# Patient Record
Sex: MALE | Race: WHITE | NOT HISPANIC OR LATINO | Employment: OTHER | URBAN - METROPOLITAN AREA
[De-identification: names, ages, dates, MRNs, and addresses within clinical notes are randomized per-mention and may not be internally consistent; named-entity substitution may affect disease eponyms.]

---

## 2017-01-03 ENCOUNTER — OFFICE VISIT (OUTPATIENT)
Dept: FAMILY MEDICINE | Facility: CLINIC | Age: 67
End: 2017-01-03

## 2017-01-03 VITALS
DIASTOLIC BLOOD PRESSURE: 74 MMHG | TEMPERATURE: 98.8 F | WEIGHT: 212 LBS | HEIGHT: 72 IN | OXYGEN SATURATION: 97 % | HEART RATE: 82 BPM | SYSTOLIC BLOOD PRESSURE: 142 MMHG | BODY MASS INDEX: 28.71 KG/M2

## 2017-01-03 DIAGNOSIS — Z11.59 NEED FOR HEPATITIS C SCREENING TEST: ICD-10-CM

## 2017-01-03 DIAGNOSIS — E11.9 TYPE 2 DIABETES MELLITUS WITHOUT COMPLICATION, WITHOUT LONG-TERM CURRENT USE OF INSULIN (H): Primary | ICD-10-CM

## 2017-01-03 DIAGNOSIS — Z23 NEED FOR VACCINATION: ICD-10-CM

## 2017-01-03 DIAGNOSIS — E78.2 MIXED HYPERLIPIDEMIA: ICD-10-CM

## 2017-01-03 LAB — HBA1C MFR BLD: 9 % (ref 4.3–7)

## 2017-01-03 PROCEDURE — 36415 COLL VENOUS BLD VENIPUNCTURE: CPT | Performed by: FAMILY MEDICINE

## 2017-01-03 PROCEDURE — 90471 IMMUNIZATION ADMIN: CPT | Performed by: FAMILY MEDICINE

## 2017-01-03 PROCEDURE — 80053 COMPREHEN METABOLIC PANEL: CPT | Mod: 90 | Performed by: FAMILY MEDICINE

## 2017-01-03 PROCEDURE — 90686 IIV4 VACC NO PRSV 0.5 ML IM: CPT | Performed by: FAMILY MEDICINE

## 2017-01-03 PROCEDURE — 99213 OFFICE O/P EST LOW 20 MIN: CPT | Mod: 25 | Performed by: FAMILY MEDICINE

## 2017-01-03 PROCEDURE — 80061 LIPID PANEL: CPT | Mod: 90 | Performed by: FAMILY MEDICINE

## 2017-01-03 PROCEDURE — 83036 HEMOGLOBIN GLYCOSYLATED A1C: CPT | Performed by: FAMILY MEDICINE

## 2017-01-03 PROCEDURE — 86803 HEPATITIS C AB TEST: CPT | Mod: 90 | Performed by: FAMILY MEDICINE

## 2017-01-03 RX ORDER — ATORVASTATIN CALCIUM 20 MG/1
20 TABLET, FILM COATED ORAL DAILY
Qty: 90 TABLET | Refills: 1 | Status: SHIPPED | OUTPATIENT
Start: 2017-01-03 | End: 2017-08-07

## 2017-01-03 RX ORDER — METFORMIN HCL 500 MG
2000 TABLET, EXTENDED RELEASE 24 HR ORAL
Qty: 360 TABLET | Refills: 1 | Status: SHIPPED | OUTPATIENT
Start: 2017-01-03 | End: 2017-07-18

## 2017-01-03 NOTE — NURSING NOTE
Lars Coleman is here for a fasting med check.    Pre-Visit Screening :  Immunizations : up to date  Colon Screening : is up to date  Asthma Action Test/Plan : NA  PHQ9/GAD7 :  NA  BP done on the left arm, with a large sized cuff.  Pulse - regular  My Chart - accepts    CLASSIFICATION OF OVERWEIGHT AND OBESITY BY BMI                         Obesity Class           BMI(kg/m2)  Underweight                                    < 18.5  Normal                                         18.5-24.9  Overweight                                     25.0-29.9  OBESITY                     I                  30.0-34.9                              II                 35.0-39.9  EXTREME OBESITY             III                >40                             Patient's  BMI Body mass index is 29.16 kg/(m^2).  http://hin.nhlbi.nih.gov/menuplanner/menu.cgi  Questioned patient about current smoking habits.  Pt. has never smoked.  Minnie Lozada, CMA

## 2017-01-03 NOTE — PROGRESS NOTES
SUBJECTIVE:                                                    Lars Coleman is a 66 year old male who presents to clinic today for the following health issues:        Diabetes Follow-up, Lantus at 30 qhs    Patient is checking blood sugars: once daily.  Results are as follows:         am - 170    Diabetic concerns: None     Symptoms of hypoglycemia (low blood sugar): none     Paresthesias (numbness or burning in feet) or sores: No     Date of last diabetic eye exam: planned     Hyperlipidemia Follow-Up      Rate your low fat/cholesterol diet?: good    Taking statin?  Yes, no muscle aches from statin    Other lipid medications/supplements?:  none       Amount of exercise or physical activity: 1 day/week for an average of 30-45 minutes    Problems taking medications regularly: No    Medication side effects: none    Diet: regular (no restrictions)        Problem list and histories reviewed & adjusted, as indicated.  Additional history: as documented    Patient Active Problem List   Diagnosis     Mixed hyperlipidemia     Family history of malignant neoplasm of gastrointestinal tract     Obesity     Abdominal pain, unspecified abdominal location     Acute appendicitis with peritoneal abscess     Calculus of kidney     ACP (advance care planning)     Health Care Home     Type 2 diabetes mellitus without complication (H)     Past Surgical History   Procedure Laterality Date     Hc vasectomy unilat/bilat w postop semen       Vasectomy     Appendectomy         Social History   Substance Use Topics     Smoking status: Never Smoker      Smokeless tobacco: Not on file     Alcohol Use: 0.0 oz/week     0 Standard drinks or equivalent per week      Comment: rare     Family History   Problem Relation Age of Onset     Cancer - colorectal Father      rectal cancer     C.A.D. No family hx of      DIABETES Brother      Prostate Cancer No family hx of      CEREBROVASCULAR DISEASE Father      in his 40s (smoker)         Current  Outpatient Prescriptions   Medication Sig Dispense Refill     atorvastatin (LIPITOR) 20 MG tablet Take 1 tablet (20 mg) by mouth daily 30 tablet 0     blood glucose monitoring (NO BRAND SPECIFIED) test strip Ultra Test Strips  Use to test blood sugars 4 times daily or as directed 200 strip 11     blood glucose monitoring (NO BRAND SPECIFIED) meter device kit Ultra Test Meter  Use to test blood sugar 4 times daily or as directed. 1 kit 0     insulin glargine (LANTUS) 100 UNIT/ML PEN Inject 30 Units Subcutaneous At Bedtime 15 mL 0     metFORMIN (GLUCOPHAGE-XR) 500 MG 24 hr tablet Take 4 tablets (2,000 mg) by mouth daily (with dinner) 360 tablet 1     insulin pen needle 31G X 6 MM Use once daily or as directed. 100 each prn     blood glucose monitoring (ACCU-CHEK MULTICLIX) lancets Use to test blood sugar 3 times daily or as directed. 1 Box 11     ASPIRIN TBEC 325 MG OR 1 tab po QD (Once per day) 100 3     Allergies   Allergen Reactions     No Known Drug Allergies      Recent Labs   Lab Test  01/03/17   0920  07/12/16   0940  07/12/16   0922  03/09/16   1555  03/09/16   1546  01/22/16   0846   07/22/15   0950   A1C  9.0*   --   7.9*   --   9.3*   --    < >   --    LDL   --   130*   --   SEE COMMENT   --   SEE COMMENT  94   --   SEE COMMENT   HDL   --   30*   --   33*   --   29*   --   28*   TRIG   --   317*   --   478*   --   603*   --   860*   ALT   --   20   --    --    --    --    --   29   CR   --   0.88   --    --    --    --    --   0.88   GFRESTIMATED   --   91   --    --    --    --    --   91   POTASSIUM   --   4.3   --    --    --    --    --   4.4    < > = values in this interval not displayed.      BP Readings from Last 3 Encounters:   01/03/17 142/74   07/12/16 130/72   03/09/16 114/70    Wt Readings from Last 3 Encounters:   01/03/17 96.163 kg (212 lb)   07/12/16 94.439 kg (208 lb 3.2 oz)   03/09/16 95.709 kg (211 lb)                    ROS:  Constitutional, HEENT, cardiovascular, pulmonary, gi and gu  "systems are negative, except as otherwise noted.    OBJECTIVE:                                                    /74 mmHg  Pulse 82  Temp(Src) 98.8  F (37.1  C) (Oral)  Ht 1.816 m (5' 11.5\")  Wt 96.163 kg (212 lb)  BMI 29.16 kg/m2  SpO2 97%  Body mass index is 29.16 kg/(m^2).   GENERAL: healthy, alert and no distress  EYES: Eyes grossly normal to inspection, PERRL and conjunctivae and sclerae normal  HENT: ear canals and TM's normal, nose and mouth without ulcers or lesions  NECK: no adenopathy, no asymmetry, masses, or scars and thyroid normal to palpation  RESP: lungs clear to auscultation - no rales, rhonchi or wheezes  CV: regular rate and rhythm, normal S1 S2, no S3 or S4, no murmur, click or rub, no peripheral edema and peripheral pulses strong  ABDOMEN: soft, nontender, no hepatosplenomegaly, no masses and bowel sounds normal  MS: no gross musculoskeletal defects noted, no edema  PSYCH: mentation appears normal, affect normal/bright  Diabetic foot exam: normal DP and PT pulses, no trophic changes or ulcerative lesions and normal sensory exam    Diagnostic Test Results:  Results for orders placed or performed in visit on 01/03/17 (from the past 24 hour(s))   Hemoglobin A1c   Result Value Ref Range    Hemoglobin A1C 9.0 (A) 4.3 - 7.0 %        ASSESSMENT:                                                        PLAN:                                                    (E11.9) Type 2 diabetes mellitus without complication, without long-term current use of insulin (H)  (primary encounter diagnosis)  Comment: not exercising and sugars up , will increase Lantus to 36 units  Plan: Albumin Random Urine Quantitative, Lipid         Profile, Comprehensive metabolic panel        Increase lantus, I reviewed the risks, benefits, and possible side effects of the medication.  The patient had an opportunity to ask any questions regarding the treatment plan. The patient was encouraged to call my office if any " "problems. Recheck 3 mo    (E78.2) Mixed hyperlipidemia  Comment: well controlled  Plan: Lipid Profile, Comprehensive metabolic panel        continue current medications at current doses     (Z11.59) Need for hepatitis C screening test  Comment:   Plan: Hepatitis C antibody            (Z23) Need for vaccination  Comment:   Plan: VACCINE ADMINISTRATION, INITIAL, HC FLU VAC         PRESRV FREE QUAD SPLIT VIR 3+YRS IM              BMI:   Estimated body mass index is 29.16 kg/(m^2) as calculated from the following:    Height as of this encounter: 1.816 m (5' 11.5\").    Weight as of this encounter: 96.163 kg (212 lb).   Weight management plan: Discussed healthy diet and exercise guidelines and patient will follow up in 6 months in clinic to re-evaluate.      FUTURE APPOINTMENTS:       - Follow-up visit in 3 mo    Pop Pena MD  [facility]      "

## 2017-01-04 LAB
ALBUMIN SERPL-MCNC: 4.3 G/DL (ref 3.6–5.1)
ALBUMIN/GLOB SERPL: 1.4 (CALC) (ref 1–2.5)
ALP SERPL-CCNC: 75 U/L (ref 40–115)
ALT SERPL-CCNC: 25 U/L (ref 9–46)
AST SERPL-CCNC: 23 U/L (ref 10–35)
BILIRUB SERPL-MCNC: 0.6 MG/DL (ref 0.2–1.2)
BUN SERPL-MCNC: 14 MG/DL (ref 7–25)
BUN/CREATININE RATIO: ABNORMAL (CALC) (ref 6–22)
CALCIUM SERPL-MCNC: 9.5 MG/DL (ref 8.6–10.3)
CHLORIDE SERPLBLD-SCNC: 102 MMOL/L (ref 98–110)
CHOLEST SERPL-MCNC: 140 MG/DL (ref 125–200)
CHOLEST/HDLC SERPL: 4.5 (CALC)
CO2 SERPL-SCNC: 27 MMOL/L (ref 20–31)
CREAT SERPL-MCNC: 0.86 MG/DL (ref 0.7–1.25)
EGFR AFRICAN AMERICAN - QUEST: 105 ML/MIN/1.73M2
GFR SERPL CREATININE-BSD FRML MDRD: 91 ML/MIN/1.73M2
GLOBULIN, CALCULATED - QUEST: 3 G/DL (CALC) (ref 1.9–3.7)
GLUCOSE - QUEST: 195 MG/DL (ref 65–99)
HCV AB - QUEST: NORMAL
HDLC SERPL-MCNC: 31 MG/DL
LDLC SERPL CALC-MCNC: 71 MG/DL (CALC)
NONHDLC SERPL-MCNC: 109 MG/DL (CALC)
POTASSIUM SERPL-SCNC: 4.1 MMOL/L (ref 3.5–5.3)
PROT SERPL-MCNC: 7.3 G/DL (ref 6.1–8.1)
SIGNAL TO CUT OFF - QUEST: 0.05
SODIUM SERPL-SCNC: 138 MMOL/L (ref 135–146)
TRIGL SERPL-MCNC: 189 MG/DL

## 2017-02-13 DIAGNOSIS — E11.9 TYPE 2 DIABETES MELLITUS WITHOUT COMPLICATION, WITHOUT LONG-TERM CURRENT USE OF INSULIN (H): ICD-10-CM

## 2017-02-13 NOTE — TELEPHONE ENCOUNTER
Patient is requesting a refill of the following med:  Pending Prescriptions:                       Disp   Refills    insulin glargine (LANTUS) 100 UNIT/ML inj*                    Sig: Inject 36 Units Subcutaneous At Bedtime    Last Refill: 01/03/2017  Last Office Visit: 01/03/2017  Scheduled Office Visit: None Scheduled    Please Close Encounter If Approved.    Thank You,  Minnie Lozada, CMA

## 2017-07-18 DIAGNOSIS — E11.9 TYPE 2 DIABETES MELLITUS WITHOUT COMPLICATION, WITHOUT LONG-TERM CURRENT USE OF INSULIN (H): ICD-10-CM

## 2017-07-18 RX ORDER — METFORMIN HCL 500 MG
2000 TABLET, EXTENDED RELEASE 24 HR ORAL
Qty: 120 TABLET | Refills: 0 | COMMUNITY
Start: 2017-07-18 | End: 2017-08-15

## 2017-07-18 NOTE — TELEPHONE ENCOUNTER
Refill Request Received from [facility] in [place], [place].    Patient has a CPX scheduled for 08/22/17 with Dr. Pena.    Faxed a 30 day supply of the patient's medication to his pharmacy to last him through to his appointment.    Signed Prescriptions:                        Disp   Refills    metFORMIN (GLUCOPHAGE-XR) 500 MG 24 hr tab*120 ta*0        Sig: Take 4 tablets (2,000 mg) by mouth daily (with           dinner)  Authorizing Provider: DAVID PENA  Ordering User: ANNA GARVEY, [facility]

## 2017-08-07 ENCOUNTER — TELEPHONE (OUTPATIENT)
Dept: FAMILY MEDICINE | Facility: CLINIC | Age: 67
End: 2017-08-07

## 2017-08-07 DIAGNOSIS — E78.2 MIXED HYPERLIPIDEMIA: ICD-10-CM

## 2017-08-07 RX ORDER — ATORVASTATIN CALCIUM 20 MG/1
20 TABLET, FILM COATED ORAL DAILY
Qty: 30 TABLET | Refills: 0 | COMMUNITY
Start: 2017-08-07 | End: 2017-08-22

## 2017-08-15 DIAGNOSIS — E11.9 TYPE 2 DIABETES MELLITUS WITHOUT COMPLICATION, WITHOUT LONG-TERM CURRENT USE OF INSULIN (H): ICD-10-CM

## 2017-08-15 RX ORDER — METFORMIN HCL 500 MG
2000 TABLET, EXTENDED RELEASE 24 HR ORAL
Qty: 120 TABLET | Refills: 0 | COMMUNITY
Start: 2017-08-15 | End: 2017-08-22

## 2017-08-15 NOTE — TELEPHONE ENCOUNTER
Called and spoke with the patient to see if he had enough Metformin to last him until his appointment, he does not so I am sending a 30 day supply to his pharmacy.    Signed Prescriptions:                        Disp   Refills    metFORMIN (GLUCOPHAGE-XR) 500 MG 24 hr tab*120 ta*0        Sig: Take 4 tablets (2,000 mg) by mouth daily (with           dinner)  Authorizing Provider: DAVID JOSE  Ordering User: ANNA GARVEY, [facility]

## 2017-08-21 ENCOUNTER — TRANSFERRED RECORDS (OUTPATIENT)
Dept: FAMILY MEDICINE | Facility: CLINIC | Age: 67
End: 2017-08-21

## 2017-08-22 ENCOUNTER — OFFICE VISIT (OUTPATIENT)
Dept: FAMILY MEDICINE | Facility: CLINIC | Age: 67
End: 2017-08-22

## 2017-08-22 ENCOUNTER — TELEPHONE (OUTPATIENT)
Dept: PHARMACY | Facility: OTHER | Age: 67
End: 2017-08-22

## 2017-08-22 VITALS
TEMPERATURE: 98.7 F | HEIGHT: 72 IN | OXYGEN SATURATION: 98 % | WEIGHT: 204 LBS | SYSTOLIC BLOOD PRESSURE: 134 MMHG | DIASTOLIC BLOOD PRESSURE: 68 MMHG | BODY MASS INDEX: 27.63 KG/M2 | HEART RATE: 103 BPM

## 2017-08-22 DIAGNOSIS — E11.9 TYPE 2 DIABETES MELLITUS WITHOUT COMPLICATION, WITH LONG-TERM CURRENT USE OF INSULIN (H): ICD-10-CM

## 2017-08-22 DIAGNOSIS — E78.2 MIXED HYPERLIPIDEMIA: ICD-10-CM

## 2017-08-22 DIAGNOSIS — Z00.00 ROUTINE GENERAL MEDICAL EXAMINATION AT A HEALTH CARE FACILITY: Primary | ICD-10-CM

## 2017-08-22 DIAGNOSIS — Z23 NEED FOR VACCINATION: ICD-10-CM

## 2017-08-22 DIAGNOSIS — Z79.4 TYPE 2 DIABETES MELLITUS WITHOUT COMPLICATION, WITH LONG-TERM CURRENT USE OF INSULIN (H): ICD-10-CM

## 2017-08-22 LAB — HBA1C MFR BLD: 8.3 % (ref 4–7)

## 2017-08-22 PROCEDURE — 83036 HEMOGLOBIN GLYCOSYLATED A1C: CPT | Performed by: FAMILY MEDICINE

## 2017-08-22 PROCEDURE — 36415 COLL VENOUS BLD VENIPUNCTURE: CPT | Performed by: FAMILY MEDICINE

## 2017-08-22 PROCEDURE — 84153 ASSAY OF PSA TOTAL: CPT | Mod: 90 | Performed by: FAMILY MEDICINE

## 2017-08-22 PROCEDURE — 80053 COMPREHEN METABOLIC PANEL: CPT | Mod: 90 | Performed by: FAMILY MEDICINE

## 2017-08-22 PROCEDURE — 80061 LIPID PANEL: CPT | Mod: 90 | Performed by: FAMILY MEDICINE

## 2017-08-22 PROCEDURE — 90471 IMMUNIZATION ADMIN: CPT | Performed by: FAMILY MEDICINE

## 2017-08-22 PROCEDURE — 90670 PCV13 VACCINE IM: CPT | Performed by: FAMILY MEDICINE

## 2017-08-22 PROCEDURE — 99397 PER PM REEVAL EST PAT 65+ YR: CPT | Mod: 25 | Performed by: FAMILY MEDICINE

## 2017-08-22 RX ORDER — ATORVASTATIN CALCIUM 20 MG/1
20 TABLET, FILM COATED ORAL DAILY
Qty: 90 TABLET | Refills: 0 | Status: SHIPPED | OUTPATIENT
Start: 2017-08-22 | End: 2017-11-27

## 2017-08-22 RX ORDER — METFORMIN HCL 500 MG
2000 TABLET, EXTENDED RELEASE 24 HR ORAL
Qty: 360 TABLET | Refills: 0 | Status: SHIPPED | OUTPATIENT
Start: 2017-08-22 | End: 2017-11-15

## 2017-08-22 NOTE — TELEPHONE ENCOUNTER
MTM referral from: [place] clinic visit (referral by provider)    MTM referral outreach attempt #1 on August 22, 2017 at 12:07 PM      Outcome: Left Message    Maricel Waterman MTM Coordinator

## 2017-08-22 NOTE — PROGRESS NOTES
SUBJECTIVE:   CC: Lars Coleman is an 67 year old male who presents for preventative health visit.     Healthy Habits:    Do you get at least three servings of calcium containing foods daily (dairy, green leafy vegetables, etc.)? yes    Amount of exercise or daily activities, outside of work: 5 day(s) per week    Problems taking medications regularly No    Medication side effects: No    Have you had an eye exam in the past two years? yes    Do you see a dentist twice per year? yes    Do you have sleep apnea, excessive snoring or daytime drowsiness?no      Pt checking am sugars and is seeing 150-180 mainly        Today's PHQ-2 Score: PHQ-2 ( 1999 Pfizer) 8/22/2017   Q1: Little interest or pleasure in doing things 0   Q2: Feeling down, depressed or hopeless 0   PHQ-2 Score 0         Abuse: Current or Past(Physical, Sexual or Emotional)- No  Do you feel safe in your environment - Yes  Social History   Substance Use Topics     Smoking status: Never Smoker     Smokeless tobacco: Not on file     Alcohol use 0.0 oz/week     0 Standard drinks or equivalent per week      Comment: rare     The patient does not drink >3 drinks per day nor >7 drinks per week.    Last PSA:   [facility] PSA   Date Value Ref Range Status   07/09/2003 0.8 0.0 - 4.0 NG/ML Final       Reviewed orders with patient. Reviewed health maintenance and updated orders accordingly - Yes  BP Readings from Last 3 Encounters:   08/22/17 134/68   01/03/17 142/74   07/12/16 130/72    Wt Readings from Last 3 Encounters:   08/22/17 92.5 kg (204 lb)   01/03/17 96.2 kg (212 lb)   07/12/16 94.4 kg (208 lb 3.2 oz)                  Patient Active Problem List   Diagnosis     Mixed hyperlipidemia     Family history of malignant neoplasm of gastrointestinal tract     Calculus of kidney     ACP (advance care planning)     Health Care Home     Type 2 diabetes mellitus without complication, with long-term current use of insulin (H)     Past Surgical History:   Procedure  Laterality Date     APPENDECTOMY       HC VASECTOMY UNILAT/BILAT W POSTOP SEMEN      Vasectomy       Social History   Substance Use Topics     Smoking status: Never Smoker     Smokeless tobacco: Not on file     Alcohol use 0.0 oz/week     0 Standard drinks or equivalent per week      Comment: rare     Family History   Problem Relation Age of Onset     Cancer - colorectal Father      rectal cancer     C.A.D. No family hx of      DIABETES Brother      Prostate Cancer No family hx of      CEREBROVASCULAR DISEASE Father      in his 40s (smoker)         Current Outpatient Prescriptions   Medication Sig Dispense Refill     metFORMIN (GLUCOPHAGE-XR) 500 MG 24 hr tablet Take 4 tablets (2,000 mg) by mouth daily (with dinner) 120 tablet 0     atorvastatin (LIPITOR) 20 MG tablet Take 1 tablet (20 mg) by mouth daily 30 tablet 0     insulin glargine (LANTUS) 100 UNIT/ML injection Inject 36 Units Subcutaneous At Bedtime 3 mL 1     insulin pen needle 31G X 6 MM Use once daily or as directed. 100 each prn     blood glucose monitoring (NO BRAND SPECIFIED) test strip Ultra Test Strips  Use to test blood sugars 4 times daily or as directed 200 strip 11     blood glucose monitoring (NO BRAND SPECIFIED) meter device kit Ultra Test Meter  Use to test blood sugar 4 times daily or as directed. 1 kit 0     blood glucose monitoring (ACCU-CHEK MULTICLIX) lancets Use to test blood sugar 3 times daily or as directed. 1 Box 11     ASPIRIN TBEC 325 MG OR 1 tab po QD (Once per day) 100 3     Allergies   Allergen Reactions     No Known Drug Allergies      Recent Labs   Lab Test  08/22/17   1053  01/03/17   0934  01/03/17   0920  07/12/16   0940  07/12/16   0922  03/09/16   1555   07/22/15   0950   A1C  8.3*   --   9.0*   --   7.9*   --    < >   --    LDL   --   71   --   130*   --   SEE COMMENT   < >  SEE COMMENT   HDL   --   31*   --   30*   --   33*   < >  28*   TRIG   --   189*   --   317*   --   478*   < >  860*   ALT   --   25   --   20   --    " --    --   29   CR   --   0.86   --   0.88   --    --    --   0.88   GFRESTIMATED   --   91   --   91   --    --    --   91   POTASSIUM   --   4.1   --   4.3   --    --    --   4.4    < > = values in this interval not displayed.              Reviewed and updated as needed this visit by clinical staffTobacco  Allergies  Problems         Reviewed and updated as needed this visit by Provider        Past Medical History:   Diagnosis Date     Family history of diabetes mellitus     Brother     Family history of malignant neoplasm of gastrointestinal tract     Father     Mixed hyperlipidemia       Past Surgical History:   Procedure Laterality Date     APPENDECTOMY       HC VASECTOMY UNILAT/BILAT W POSTOP SEMEN      Vasectomy       ROS:  C: NEGATIVE for fever, chills, change in weight  I: NEGATIVE for worrisome rashes, moles or lesions  E: NEGATIVE for vision changes or irritation  ENT: NEGATIVE for ear, mouth and throat problems  R: NEGATIVE for significant cough or SOB  CV: NEGATIVE for chest pain, palpitations or peripheral edema  GI: NEGATIVE for nausea, abdominal pain, heartburn, or change in bowel habits   male: negative for dysuria, hematuria, decreased urinary stream, erectile dysfunction, urethral discharge  M: NEGATIVE for significant arthralgias or myalgia  N: NEGATIVE for weakness, dizziness or paresthesias  E: NEGATIVE for temperature intolerance, skin/hair changes  P: NEGATIVE for changes in mood or affect    OBJECTIVE:   /68 (BP Location: Right arm, Patient Position: Chair, Cuff Size: Adult Large)  Pulse 103  Temp 98.7  F (37.1  C) (Oral)  Ht 1.816 m (5' 11.5\")  Wt 92.5 kg (204 lb)  SpO2 98%  BMI 28.06 kg/m2  EXAM:  GENERAL: healthy, alert and no distress  EYES: Eyes grossly normal to inspection, PERRL and conjunctivae and sclerae normal  HENT: ear canals and TM's normal, nose and mouth without ulcers or lesions  NECK: no adenopathy, no asymmetry, masses, or scars and thyroid normal to " palpation  RESP: lungs clear to auscultation - no rales, rhonchi or wheezes  CV: regular rate and rhythm, normal S1 S2, no S3 or S4, no murmur, click or rub, no peripheral edema and peripheral pulses strong  ABDOMEN: soft, nontender, no hepatosplenomegaly, no masses and bowel sounds normal   (male): normal male genitalia without lesions or urethral discharge, no hernia  RECTAL (male): deferred  MS: no gross musculoskeletal defects noted, no edema  SKIN: no suspicious lesions or rashes  NEURO: Normal strength and tone, mentation intact and speech normal  PSYCH: mentation appears normal, affect normal/bright    ASSESSMENT/PLAN:   (Z00.00) Routine general medical examination at a health care facility  (primary encounter diagnosis)  Comment: discussed preventitive healthcare   Plan: Continue to work on healthy diet and exercise, discussed healthy habits     (E11.9,  Z79.4) Type 2 diabetes mellitus without complication, with long-term current use of insulin (H)  Comment: poorly controlled-needs to get am sugars under goal, pt using supplement smoothie daily-not sure how this might affect sugars  Plan: metFORMIN (GLUCOPHAGE-XR) 500 MG 24 hr tablet,         insulin glargine (LANTUS) 100 UNIT/ML injection        Pt to use rule of 3's-will increase Lantus by 3 units after every 3 day period where am sugars above 130. Goal is for all am sugars to fall between -pt to let me know how this goes in 2 weeks    MTM referral as well     (E78.2) Mixed hyperlipidemia  Comment: control uncertain  Plan: atorvastatin (LIPITOR) 20 MG tablet        continue current medications at current doses       COUNSELING:  Reviewed preventive health counseling, as reflected in patient instructions       Regular exercise       Healthy diet/nutrition       Immunizations    Vaccinated for: Pneumococcal           Colon cancer screening       Prostate cancer screening      BP Screening:   Last 3 BP Readings:    BP Readings from Last 3 Encounters:  "  08/22/17 134/68   01/03/17 142/74   07/12/16 130/72       The following was recommended to the patient:  Re-screen BP within a year and recommended lifestyle modifications       reports that he has never smoked. He does not have any smokeless tobacco history on file.      Estimated body mass index is 28.06 kg/(m^2) as calculated from the following:    Height as of this encounter: 1.816 m (5' 11.5\").    Weight as of this encounter: 92.5 kg (204 lb).   Weight management plan: Discussed healthy diet and exercise guidelines and patient will follow up in 3 months in clinic to re-evaluate.    Counseling Resources:  ATP IV Guidelines  Pooled Cohorts Equation Calculator  FRAX Risk Assessment  ICSI Preventive Guidelines  Dietary Guidelines for Americans, 2010  USDA's MyPlate  ASA Prophylaxis  Lung CA Screening    Pop Pena MD  [facility]  "

## 2017-08-22 NOTE — NURSING NOTE
Lars is here for CPX and form    Patient is here for a full physical exam.    Pre-Visit Screening :  Immunizations : Prevnar 13 today  Colon Screening : is up to date  Mammogram:NA  Asthma Action Test/Plan : NA  PHQ9/GAD7 :  none  Fall Risk Assessment is up to date    Vitals:  Pulse - regular  My Chart - accepts    CLASSIFICATION OF OVERWEIGHT AND OBESITY BY BMI                         Obesity Class           BMI(kg/m2)  Underweight                                    < 18.5  Normal                                         18.5-24.9  Overweight                                     25.0-29.9  OBESITY                     I                  30.0-34.9                              II                 35.0-39.9  EXTREME OBESITY             III                >40                             Patient's  BMI Body mass index is 28.06 kg/(m^2).  http://hin.nhlbi.nih.gov/menuplanner/menu.cgi  Questioned patient about current smoking habits.  Pt. has never smoked.    ETOH screening:  Questions:  1-How often do you have a drink containing alcohol?                             2 times per week(s)  2-How many drinks containing alcohol do you have on a typical day when you are         Drinking?                              2   3- How often do you have 5 or more drinks on one occasion?                              Never    Have you ever:  None of the patient's responses to the CAGE screening were positive / Negative CAGE score

## 2017-08-22 NOTE — MR AVS SNAPSHOT
After Visit Summary   8/22/2017    Lars Coleman    MRN: [id number]           Patient Information     Date Of Birth          1950        Visit Information        Provider Department      8/22/2017 10:30 AM Pop Pena MD [facility]        Today's Diagnoses     Routine general medical examination at a health care facility    -  1    Type 2 diabetes mellitus without complication, with long-term current use of insulin (H)        Mixed hyperlipidemia           Follow-ups after your visit        Additional Services     MED THERAPY MANAGE REFERRAL       Your provider has referred you to: **[place] [facility] Scheduling (numerous locations) [phone]   [email or web address]  [facility] [place] [phone]   [email or web address]    Reason for Referral: diabetes    The [place] [facility] department will contact you to schedule an appointment.  You may also schedule the appointment by calling [phone].  For [place] patients, please call [phone] to confirm/schedule your appointment on the next business day.    This service is designed to help you get the most from your medications.  A specially trained Pharmacist will work closely with you and your providers to solve any questions, concerns, issues or problems related to your medications.    Please bring all of your prescription and non-prescription medications (such as vitamins, over-the-counter medications, and herbals) or a detailed medication list to your appointment.    If you have a glucose meter or other home monitoring information, please also bring this to your appointment (i.e. blood glucose log, blood pressure log, pain log, etc.).                  Follow-up notes from your care team     Return in about 3 months (around 11/22/2017).      Who to contact     If you have  "questions or need follow up information about today's clinic visit or your schedule please contact [facility] directly at [phone].  Normal or non-critical lab and imaging results will be communicated to you by MyChart, letter or phone within 4 business days after the clinic has received the results. If you do not hear from us within 7 days, please contact the clinic through [employer] or phone. If you have a critical or abnormal lab result, we will notify you by phone as soon as possible.  Submit refill requests through [employer] or call your pharmacy and they will forward the refill request to us. Please allow 3 business days for your refill to be completed.          Additional Information About Your Visit        [employer] Information     [employer] gives you secure access to your electronic health record. If you see a primary care provider, you can also send messages to your care team and make appointments. If you have questions, please call your primary care clinic.  If you do not have a primary care provider, please call [phone] and they will assist you.        Care EveryWhere ID     This is your Care EveryWhere ID. This could be used by other organizations to access your [place] medical records  [id number]        Your Vitals Were     Pulse Temperature Height Pulse Oximetry BMI (Body Mass Index)       103 98.7  F (37.1  C) (Oral) 1.816 m (5' 11.5\") 98% 28.06 kg/m2        Blood Pressure from Last 3 Encounters:   08/22/17 134/68   01/03/17 142/74   07/12/16 130/72    Weight from Last 3 Encounters:   08/22/17 92.5 kg (204 lb)   01/03/17 96.2 kg (212 lb)   07/12/16 94.4 kg (208 lb 3.2 oz)              We Performed the Following     COMPREHENSIVE METABOLIC PANEL ([facility]) XCMP     HCL PSA, SCREENING ([facility])     Hemoglobin A1c     Lipid Profile ([facility])     MED THERAPY MANAGE REFERRAL     VENOUS COLLECTION          Today's Medication Changes          These changes are accurate as of: 8/22/17 " 11:06 AM.  If you have any questions, ask your nurse or doctor.               These medicines have changed or have updated prescriptions.        Dose/Directions    insulin glargine 100 UNIT/ML injection   Commonly known as:  LANTUS   This may have changed:  how much to take   Used for:  Type 2 diabetes mellitus without complication, with long-term current use of insulin (H)   Changed by:  Pop Pena MD        Dose:  39 Units   Inject 39 Units Subcutaneous At Bedtime   Quantity:  3 mL   Refills:  0            Where to get your medicines      These medications were sent to [place] [address] [facility] - [place], [place] - [address]  [address], [facility] [address]     Phone:  [phone]     atorvastatin 20 MG tablet    insulin glargine 100 UNIT/ML injection    metFORMIN 500 MG 24 hr tablet                Primary Care Provider Office Phone # Fax #    Pop Pena [phone] [address] [facility] [address]  [facility] [address]        Equal Access to Services     [facility] Center[facility]DIR[facility] AH: Hadii aad ku hadasho Soomaali, waaxda luqadaha, qaybta kaalmada adeegyada, waxay idiin hayaan adeeg kharaleonel buck . So [facility] [phone].    ATENCIÓN: Si habla español, tiene a queen disposición servicios gratuitos de asistencia lingüística. Llame al [phone].    We comply with applicable federal civil rights laws and [place] laws. We do not discriminate on the basis of race, color, national origin, age, disability sex, sexual orientation or gender identity.            Thank you!     Thank you for choosing [facility]  for your care. Our goal is always to provide you with excellent care. Hearing back from our patients is one way we can continue to improve our services. Please take a few minutes to complete the written survey that you may receive in the mail after your visit with us. Thank you!             Your Updated Medication List - Protect others around you: Learn how  to safely use, store and throw away your medicines at www.disposemymeds.org.          This list is accurate as of: 8/22/17 11:06 AM.  Always use your most recent med list.                   Brand Name Dispense Instructions for use Diagnosis    aspirin 325 MG EC tablet     100    1 tab po QD (Once per day)        atorvastatin 20 MG tablet    LIPITOR    90 tablet    Take 1 tablet (20 mg) by mouth daily    Mixed hyperlipidemia       blood glucose monitoring lancets     1 Box    Use to test blood sugar 3 times daily or as directed.    Type 2 diabetes mellitus without complication (H)       blood glucose monitoring meter device kit    no brand specified    1 kit    Ultra Test Meter Use to test blood sugar 4 times daily or as directed.    Type 2 diabetes mellitus without complication, without long-term current use of insulin (H)       blood glucose monitoring test strip    no brand specified    200 strip    Ultra Test Strips Use to test blood sugars 4 times daily or as directed    Type 2 diabetes mellitus without complication, without long-term current use of insulin (H)       insulin glargine 100 UNIT/ML injection    LANTUS    3 mL    Inject 39 Units Subcutaneous At Bedtime    Type 2 diabetes mellitus without complication, with long-term current use of insulin (H)       insulin pen needle 31G X 6 MM     100 each    Use once daily or as directed.    Type 2 diabetes mellitus without complication, without long-term current use of insulin (H)       metFORMIN 500 MG 24 hr tablet    GLUCOPHAGE-XR    360 tablet    Take 4 tablets (2,000 mg) by mouth daily (with dinner)    Type 2 diabetes mellitus without complication, with long-term current use of insulin (H)

## 2017-08-23 LAB
ABBOTT PSA - QUEST: 0.7 NG/ML
ALBUMIN SERPL-MCNC: 4.4 G/DL (ref 3.6–5.1)
ALBUMIN/GLOB SERPL: 1.5 (CALC) (ref 1–2.5)
ALP SERPL-CCNC: 101 U/L (ref 40–115)
ALT SERPL-CCNC: 21 U/L (ref 9–46)
AST SERPL-CCNC: 19 U/L (ref 10–35)
BILIRUB SERPL-MCNC: 0.7 MG/DL (ref 0.2–1.2)
BUN SERPL-MCNC: 14 MG/DL (ref 7–25)
BUN/CREATININE RATIO: ABNORMAL (CALC) (ref 6–22)
CALCIUM SERPL-MCNC: 9.4 MG/DL (ref 8.6–10.3)
CHLORIDE SERPLBLD-SCNC: 102 MMOL/L (ref 98–110)
CHOLEST SERPL-MCNC: 155 MG/DL
CHOLEST/HDLC SERPL: 5.2 (CALC)
CO2 SERPL-SCNC: 24 MMOL/L (ref 20–31)
CREAT SERPL-MCNC: 0.87 MG/DL (ref 0.7–1.25)
EGFR AFRICAN AMERICAN - QUEST: 104 ML/MIN/1.73M2
GFR SERPL CREATININE-BSD FRML MDRD: 90 ML/MIN/1.73M2
GLOBULIN, CALCULATED - QUEST: 3 G/DL (CALC) (ref 1.9–3.7)
GLUCOSE - QUEST: 218 MG/DL (ref 65–99)
HDLC SERPL-MCNC: 30 MG/DL
LDLC SERPL CALC-MCNC: 91 MG/DL (CALC)
NONHDLC SERPL-MCNC: 125 MG/DL (CALC)
POTASSIUM SERPL-SCNC: 4.5 MMOL/L (ref 3.5–5.3)
PROT SERPL-MCNC: 7.4 G/DL (ref 6.1–8.1)
SODIUM SERPL-SCNC: 139 MMOL/L (ref 135–146)
TRIGL SERPL-MCNC: 242 MG/DL

## 2017-08-24 NOTE — TELEPHONE ENCOUNTER
MTM referral from: [facility] visit (referral by provider)    MTM referral outreach attempt #2 on August 24, 2017 at 3:23 PM      Outcome: Patient not reachable after several attempts, will route to MTM Pharmacist/Provider as an FYI. Thank you for the referral.    Maricel Waterman, MTM Coordinator

## 2017-08-28 ENCOUNTER — TELEPHONE (OUTPATIENT)
Dept: FAMILY MEDICINE | Facility: CLINIC | Age: 67
End: 2017-08-28

## 2017-08-28 NOTE — TELEPHONE ENCOUNTER
Filled out lab results on [facility] Screen form faxed to [phone] scanned a copy and mailed original to patient

## 2017-10-04 ENCOUNTER — TELEPHONE (OUTPATIENT)
Dept: FAMILY MEDICINE | Facility: CLINIC | Age: 67
End: 2017-10-04

## 2017-10-04 DIAGNOSIS — E11.9 TYPE 2 DIABETES MELLITUS WITHOUT COMPLICATION, WITH LONG-TERM CURRENT USE OF INSULIN (H): ICD-10-CM

## 2017-10-04 DIAGNOSIS — Z79.4 TYPE 2 DIABETES MELLITUS WITHOUT COMPLICATION, WITH LONG-TERM CURRENT USE OF INSULIN (H): ICD-10-CM

## 2017-10-04 NOTE — TELEPHONE ENCOUNTER
Patient is due for a medication recheck for the following medication Lantus ,and meets the qualifications for a physician approved 30 day extension.    A #30 day refill has been called into the pharmacy listed.     staff, per the refill protocol can you please call the patient and help assist in setting up a Non Fasting medication recheck.  Please attempt to reach the patient to schedule that appointment, and if you are unable to reach them, please forward back to the prescribing physician.      Telephone Information:   Mobile [phone]   Mobile [phone] (home) [phone] (work)      Thank You  BIPIN Wong

## 2017-11-14 ENCOUNTER — MYC MEDICAL ADVICE (OUTPATIENT)
Dept: FAMILY MEDICINE | Facility: CLINIC | Age: 67
End: 2017-11-14

## 2017-11-14 DIAGNOSIS — Z79.4 TYPE 2 DIABETES MELLITUS WITHOUT COMPLICATION, WITH LONG-TERM CURRENT USE OF INSULIN (H): ICD-10-CM

## 2017-11-14 DIAGNOSIS — E11.9 TYPE 2 DIABETES MELLITUS WITHOUT COMPLICATION, WITH LONG-TERM CURRENT USE OF INSULIN (H): ICD-10-CM

## 2017-11-15 RX ORDER — METFORMIN HCL 500 MG
2000 TABLET, EXTENDED RELEASE 24 HR ORAL
Qty: 120 TABLET | Refills: 0 | COMMUNITY
Start: 2017-11-15 | End: 2017-12-14

## 2017-11-15 NOTE — TELEPHONE ENCOUNTER
From: Minnie Lozada CMA  To: Lars Coleman  Sent: 11/14/2017 3:31 PM CST  Subject: Refill?    Lars,    I have received a refill request from your pharmacy for Metformin. I can see that you have an appointment scheduled for 11/22/17, do you have enough medication to last you until that appointment?    Please let me know. Have a great day.    Minnie Lozada CMA

## 2017-11-16 ENCOUNTER — OFFICE VISIT (OUTPATIENT)
Dept: PHARMACY | Facility: PHYSICIAN GROUP | Age: 67
End: 2017-11-16
Payer: COMMERCIAL

## 2017-11-16 DIAGNOSIS — E11.9 TYPE 2 DIABETES MELLITUS WITHOUT COMPLICATION, WITH LONG-TERM CURRENT USE OF INSULIN (H): Primary | ICD-10-CM

## 2017-11-16 DIAGNOSIS — Z79.4 TYPE 2 DIABETES MELLITUS WITHOUT COMPLICATION, WITH LONG-TERM CURRENT USE OF INSULIN (H): Primary | ICD-10-CM

## 2017-11-16 PROCEDURE — 99207 ZZC NO CHARGE LOS: CPT | Performed by: PHARMACIST

## 2017-11-16 NOTE — MR AVS SNAPSHOT
After Visit Summary   11/16/2017    Lars Coleman    MRN: [id number]           Patient Information     Date Of Birth          1950        Visit Information        Provider Department      11/16/2017 8:30 AM Leslye Frederick [facility]        Care Instructions    Take Lantus 34 units once daily- every day.     Leslye Frederick, Pharm.D, [facility]  Medication Therapy Management Pharmacist  [phone]            Follow-ups after your visit        Your next 10 appointments already scheduled     Nov 22, 2017  9:15 AM CST   Office Visit with Pop Pena MD   [facility] ([facility])    [address]  [address]  [facility] [address] [phone]              Who to contact     If you have questions or need follow up information about today's clinic visit or your schedule please contact [facility] directly at [phone].  Normal or non-critical lab and imaging results will be communicated to you by Instaclustrhart, letter or phone within 4 business days after the clinic has received the results. If you do not hear from us within 7 days, please contact the clinic through Instaclustrhart or phone. If you have a critical or abnormal lab result, we will notify you by phone as soon as possible.  Submit refill requests through [employer]Fuse or call your pharmacy and they will forward the refill request to us. Please allow 3 business days for your refill to be completed.          Additional Information About Your Visit        MyChart Information     [employer] gives you secure access to your electronic health record. If you see a primary care provider, you can also send messages to your care team and make appointments. If you have questions, please call your primary care clinic.  If you do not have a primary care provider, please call [phone] and they will assist you.        Care EveryWhere ID     This is your  Care EveryWhere ID. This could be used by other organizations to access your [place] medical records  [id number]         Blood Pressure from Last 3 Encounters:   08/22/17 134/68   01/03/17 142/74   07/12/16 130/72    Weight from Last 3 Encounters:   08/22/17 204 lb (92.5 kg)   01/03/17 212 lb (96.2 kg)   07/12/16 208 lb 3.2 oz (94.4 kg)              Today, you had the following     No orders found for display       Primary Care Provider Office Phone # Fax #    Pop Jimenez Pena [phone] [address] [facility] [address]  [facility] [address]        Equal Access to Services     NEREIDA BRICE : Hadii katheryn lewis hadasho Soleenaali, waaxda luqadaha, qaybta kaalmada adeegyada, jarocho buck . So [facility] [phone].    ATENCIÓN: Si habla español, tiene a queen disposición servicios gratuitos de asistencia lingüística. [facility] [phone].    We comply with applicable federal civil rights laws and [place] laws. We do not discriminate on the basis of race, color, national origin, age, disability, sex, sexual orientation, or gender identity.            Thank you!     Thank you for choosing [facility]  for your care. Our goal is always to provide you with excellent care. Hearing back from our patients is one way we can continue to improve our services. Please take a few minutes to complete the written survey that you may receive in the mail after your visit with us. Thank you!             Your Updated Medication List - Protect others around you: Learn how to safely use, store and throw away your medicines at www.disposemymeds.org.          This list is accurate as of: 11/16/17  9:22 AM.  Always use your most recent med list.                   Brand Name Dispense Instructions for use Diagnosis    aspirin 325 MG EC tablet     100    1 tab po QD (Once per day)        atorvastatin 20 MG tablet    LIPITOR    90 tablet    Take 1 tablet (20 mg) by mouth daily     Mixed hyperlipidemia       blood glucose monitoring lancets     1 Box    Use to test blood sugar 3 times daily or as directed.    Type 2 diabetes mellitus without complication (H)       blood glucose monitoring meter device kit    no brand specified    1 kit    Ultra Test Meter Use to test blood sugar 4 times daily or as directed.    Type 2 diabetes mellitus without complication, without long-term current use of insulin (H)       blood glucose monitoring test strip    no brand specified    200 strip    Ultra Test Strips Use to test blood sugars 4 times daily or as directed    Type 2 diabetes mellitus without complication, without long-term current use of insulin (H)       insulin glargine 100 UNIT/ML injection    LANTUS    12 mL    Inject 39 Units Subcutaneous At Bedtime    Type 2 diabetes mellitus without complication, with long-term current use of insulin (H)       insulin pen needle 31G X 6 MM     100 each    Use once daily or as directed.    Type 2 diabetes mellitus without complication, without long-term current use of insulin (H)       metFORMIN 500 MG 24 hr tablet    GLUCOPHAGE-XR    120 tablet    Take 4 tablets (2,000 mg) by mouth daily (with dinner)    Type 2 diabetes mellitus without complication, with long-term current use of insulin (H)

## 2017-11-16 NOTE — PROGRESS NOTES
Clinical Consult:                                                    Lars Coleman is a 67 year old male coming in for a clinical pharmacist consult.  He was referred to me from Dr. Pena.     Reason for Consult: DM- pt is currently out of scope for MTM    Discussion:  Taking lantus 39 units daily, but skipping this on and off at times due to fears of lows. Family member recently passed away and thought to be related to blood sugars. He is not watching diet too closely. He is walking 4-6 miles when he goes, but this is less often as the weather is getting colder.   He drinks a fruit smoothie in the morning, usually snacks on nuts for lunch then has a bigger supper. Taking many supplements, most of them manufactured by [employer].     Cornitex, taurin, ciusulin, milk thistle, tri-sugar shield, coQ 10, amazing grass green super food, georgina red krill, vit D, tumeric, green tea, acetyl l-carnitine, magnesium, vit c, nurish green energy    Checking blood sugars BID- no numbers brought in today. He states they are up to 250 at times. Worried about giving his insulin at bedtime if his sugars are under 150. Has not had any readings under 80mg/dl.     Lab Results   Component Value Date    A1C 8.3 08/22/2017    A1C 9.0 01/03/2017    A1C 7.9 07/12/2016    A1C 9.3 03/09/2016    A1C 13.1 01/22/2016     Lantus is no longer going to be covered in 2018, likely will need to be moved to basaglar. Would switch 1:1 when refilling the medication next year.       Plan:  1. Diet information reviewed and given today  2. Decrease Lantus to 34 units daily- but take every day. Discussed hypoglycemia monitoring and treatment. (may need dose increase, but trying to increase compliance first and help decrease fear of low sugars)  3. Pt to work on tracking carb intake and work on smaller more frequent meals.   4. Recommend lowering Aspirin to 81mg daily  5. Will switch to Basaglar in 2018 due to formulary change.    All changes made per CPA  with Dr. Pena. Pt is seeing PCP next week for follow up. No scheduled MTM follow up since patient is currently out of scope.       Leslye Frederick, Pharm.D, [facility]  Medication Therapy Management Pharmacist  [phone]

## 2017-11-16 NOTE — PATIENT INSTRUCTIONS
Take Lantus 34 units once daily- every day.     Leslye Frederick, Pharm.D, [facility]  Medication Therapy Management Pharmacist  [phone]

## 2017-11-17 RX ORDER — ACETYLCARNITINE HCL 100 %
POWDER (GRAM) MISCELLANEOUS DAILY
COMMUNITY

## 2017-11-18 DIAGNOSIS — E11.9 TYPE 2 DIABETES MELLITUS WITHOUT COMPLICATION, WITH LONG-TERM CURRENT USE OF INSULIN (H): ICD-10-CM

## 2017-11-18 DIAGNOSIS — Z79.4 TYPE 2 DIABETES MELLITUS WITHOUT COMPLICATION, WITH LONG-TERM CURRENT USE OF INSULIN (H): ICD-10-CM

## 2017-11-18 NOTE — TELEPHONE ENCOUNTER
Pending Prescriptions:                       Disp   Refills    LANTUS SOLOSTAR 100 UNIT/ML soln [Pharmac*                    Sig: INJECT 39 UNITS SUBCUTANEOUS AT BEDTIME    [facility] please review:    Pt has an appt with you on 11-  Pt received a 30 day called in on 10-4-2017  Do you want to give another 30 day? Should pt have enough from 10-4? This is pharmacy refill   Please fill in qty  Please fax deny.  [phone] (home) [phone] (work)

## 2017-11-20 RX ORDER — INSULIN GLARGINE 100 [IU]/ML
INJECTION, SOLUTION SUBCUTANEOUS
Qty: 15 ML | Refills: 0 | Status: SHIPPED | OUTPATIENT
Start: 2017-11-20 | End: 2017-12-28

## 2017-11-22 ENCOUNTER — OFFICE VISIT (OUTPATIENT)
Dept: FAMILY MEDICINE | Facility: CLINIC | Age: 67
End: 2017-11-22

## 2017-11-22 VITALS
HEART RATE: 83 BPM | WEIGHT: 203.4 LBS | SYSTOLIC BLOOD PRESSURE: 104 MMHG | TEMPERATURE: 98.1 F | HEIGHT: 71 IN | BODY MASS INDEX: 28.48 KG/M2 | OXYGEN SATURATION: 96 % | DIASTOLIC BLOOD PRESSURE: 62 MMHG

## 2017-11-22 DIAGNOSIS — Z23 NEED FOR VACCINATION: ICD-10-CM

## 2017-11-22 DIAGNOSIS — Z79.4 TYPE 2 DIABETES MELLITUS WITHOUT COMPLICATION, WITH LONG-TERM CURRENT USE OF INSULIN (H): Primary | ICD-10-CM

## 2017-11-22 DIAGNOSIS — E11.9 TYPE 2 DIABETES MELLITUS WITHOUT COMPLICATION, WITH LONG-TERM CURRENT USE OF INSULIN (H): Primary | ICD-10-CM

## 2017-11-22 LAB
ALBUMIN URINE MG/G CR: <30 MG/G CREATININE
ALBUMIN URINE MG/SPEC: 10
CREATININE URINE: 50
HBA1C MFR BLD: 7.6 % (ref 4–7)

## 2017-11-22 PROCEDURE — 90471 IMMUNIZATION ADMIN: CPT | Performed by: FAMILY MEDICINE

## 2017-11-22 PROCEDURE — 36415 COLL VENOUS BLD VENIPUNCTURE: CPT | Performed by: FAMILY MEDICINE

## 2017-11-22 PROCEDURE — 83036 HEMOGLOBIN GLYCOSYLATED A1C: CPT | Performed by: FAMILY MEDICINE

## 2017-11-22 PROCEDURE — 99213 OFFICE O/P EST LOW 20 MIN: CPT | Mod: 25 | Performed by: FAMILY MEDICINE

## 2017-11-22 PROCEDURE — 90686 IIV4 VACC NO PRSV 0.5 ML IM: CPT | Performed by: FAMILY MEDICINE

## 2017-11-22 PROCEDURE — 82043 UR ALBUMIN QUANTITATIVE: CPT | Performed by: FAMILY MEDICINE

## 2017-11-22 NOTE — NURSING NOTE
Lars is here for a med check, pt fasting    Pre-Visit Screening :  Immunizations : up to date  Colon Screening : is up to date  Asthma Action Test/Plan : ANNIA  PHQ9/GAD7 :  NA    Pulse - regular  My Chart - accepts    CLASSIFICATION OF OVERWEIGHT AND OBESITY BY BMI                         Obesity Class           BMI(kg/m2)  Underweight                                    < 18.5  Normal                                         18.5-24.9  Overweight                                     25.0-29.9  OBESITY                     I                  30.0-34.9                              II                 35.0-39.9  EXTREME OBESITY             III                >40                             Patient's  BMI Body mass index is 28.17 kg/(m^2).  http://hin.nhlbi.nih.gov/menuplanner/menu.cgi  Questioned patient about current smoking habits.  Pt. has never smoked.  The patient has verbalized that it is ok to leave a detailed voice message on the patient's cell phone with results/recommendations from this visit.

## 2017-11-22 NOTE — PROGRESS NOTES
SUBJECTIVE:                                                    Lars Coleman is a 67 year old male who presents to clinic today for the following health issues:      Diabetes Follow-up-still taking varying doses due to concerns about hypoglycemia    Patient is checking blood sugars: twice daily.    Blood sugar testing frequency justification: Uncontrolled diabetes  Results are as follows:       am - 106-210       bedtime - 110-300        Diabetic concerns: None     Symptoms of hypoglycemia (low blood sugar): none     Paresthesias (numbness or burning in feet) or sores: No     Date of last diabetic eye exam: utd  Hyperlipidemia Follow-Up      Rate your low fat/cholesterol diet?: good    Taking statin?  Yes, no muscle aches from statin    Other lipid medications/supplements?:  none          Amount of exercise or physical activity: 2-3 days/week for an average of 30-45 minutes    Problems taking medications regularly: No    Medication side effects: none  Diet: regular (no restrictions)      Rash-annular left heel, no itch        Problem list and histories reviewed & adjusted, as indicated.  Additional history: as documented    Patient Active Problem List   Diagnosis     Mixed hyperlipidemia     Family history of malignant neoplasm of gastrointestinal tract     Calculus of kidney     ACP (advance care planning)     Health Care Home     Type 2 diabetes mellitus without complication, with long-term current use of insulin (H)     Past Surgical History:   Procedure Laterality Date     APPENDECTOMY       HC VASECTOMY UNILAT/BILAT W POSTOP SEMEN      Vasectomy       Social History   Substance Use Topics     Smoking status: Never Smoker     Smokeless tobacco: Not on file     Alcohol use 0.0 oz/week     0 Standard drinks or equivalent per week      Comment: rare     Family History   Problem Relation Age of Onset     Cancer - colorectal Father      rectal cancer     C.A.D. No family hx of      DIABETES Brother      Prostate  Cancer No family hx of      CEREBROVASCULAR DISEASE Father      in his 40s (smoker)         Current Outpatient Prescriptions   Medication Sig Dispense Refill     LANTUS SOLOSTAR 100 UNIT/ML soln INJECT 39 UNITS SUBCUTANEOUS AT BEDTIME 15 mL 0     Nutritional Supplements (NUTRITIONAL SUPPLEMENT PO) Cornitex, Cinsulin, TriSugar Shield, Nurish Green Energy, Amazing Grass Green Superfood       TAURINE PO Take 1 tablet by mouth daily       MILK THISTLE PO Take 1 tablet by mouth daily       KRILL OIL PO Take 1 capsule by mouth daily       TURMERIC PO Take 1 tablet by mouth daily       Acetylcarnitine HCl (ACETYL-L-CARNITINE HCL) POWD daily       Cholecalciferol (VITAMIN D PO) Take by mouth daily       MAGNESIUM PO Take by mouth daily       Ascorbic Acid (VITAMIN C PO) Take by mouth daily       Green Tea, Camillia sinensis, (GREEN TEA EXTRACT PO) Take by mouth daily       metFORMIN (GLUCOPHAGE-XR) 500 MG 24 hr tablet Take 4 tablets (2,000 mg) by mouth daily (with dinner) 120 tablet 0     atorvastatin (LIPITOR) 20 MG tablet Take 1 tablet (20 mg) by mouth daily 90 tablet 0     insulin pen needle 31G X 6 MM Use once daily or as directed. 100 each prn     blood glucose monitoring (NO BRAND SPECIFIED) test strip Ultra Test Strips  Use to test blood sugars 4 times daily or as directed 200 strip 11     blood glucose monitoring (NO BRAND SPECIFIED) meter device kit Ultra Test Meter  Use to test blood sugar 4 times daily or as directed. 1 kit 0     blood glucose monitoring (ACCU-CHEK MULTICLIX) lancets Use to test blood sugar 3 times daily or as directed. 1 Box 11     ASPIRIN TBEC 325 MG OR 1 tab po QD (Once per day) 100 3     Allergies   Allergen Reactions     No Known Drug Allergies      Recent Labs   Lab Test  11/22/17   0932  08/22/17   1124  08/22/17   1053  01/03/17   0934  01/03/17   0920  07/12/16   0940   A1C  7.6*   --   8.3*   --   9.0*   --    LDL   --   91   --   71   --   130*   HDL   --   30*   --   31*   --   30*  "  TRIG   --   242*   --   189*   --   317*   ALT   --   21   --   25   --   20   CR   --   0.87   --   0.86   --   0.88   GFRESTIMATED   --   90   --   91   --   91   POTASSIUM   --   4.5   --   4.1   --   4.3      BP Readings from Last 3 Encounters:   11/22/17 104/62   08/22/17 134/68   01/03/17 142/74    Wt Readings from Last 3 Encounters:   11/22/17 92.3 kg (203 lb 6.4 oz)   08/22/17 92.5 kg (204 lb)   01/03/17 96.2 kg (212 lb)                          ROS:  Constitutional, HEENT, cardiovascular, pulmonary, gi and gu systems are negative, except as otherwise noted.      OBJECTIVE:   /62 (BP Location: Left arm, Patient Position: Chair, Cuff Size: Adult Large)  Pulse 83  Temp 98.1  F (36.7  C) (Oral)  Ht 1.81 m (5' 11.25\")  Wt 92.3 kg (203 lb 6.4 oz)  SpO2 96%  BMI 28.17 kg/m2  Body mass index is 28.17 kg/(m^2).   GENERAL: healthy, alert and no distress  EYES: Eyes grossly normal to inspection, PERRL and conjunctivae and sclerae normal  HENT: ear canals and TM's normal, nose and mouth without ulcers or lesions  NECK: no adenopathy, no asymmetry, masses, or scars and thyroid normal to palpation  RESP: lungs clear to auscultation - no rales, rhonchi or wheezes  CV: regular rate and rhythm, normal S1 S2, no S3 or S4, no murmur, click or rub, no peripheral edema and peripheral pulses strong  ABDOMEN: soft, nontender, no hepatosplenomegaly, no masses and bowel sounds normal  MS: no gross musculoskeletal defects noted, no edema    Diagnostic Test Results:  Results for orders placed or performed in visit on 11/22/17 (from the past 24 hour(s))   Hemoglobin A1c   Result Value Ref Range    Hemoglobin A1C 7.6 (A) 4.0 - 7.0 %       ASSESSMENT:       PLAN:   (E11.9,  Z79.4) Type 2 diabetes mellitus without complication, with long-term current use of insulin (H)  (primary encounter diagnosis)  Comment: pt still not taking regular lantus dose as he is concerned about hypo-had long discussion that he is very unlikely " to get low  Plan: Hemoglobin A1c, VENOUS COLLECTION, Albumin         Random Urine Quantitative with Creat Ratio        He agrees to go to 34 units Lantus every day-check sugars am, let me know if any under 80          FUTURE APPOINTMENTS:       - Follow-up visit in 3 mo  Work on weight loss  Regular exercise    Pop Pena MD  [facility]

## 2017-11-22 NOTE — MR AVS SNAPSHOT
After Visit Summary   11/22/2017    Lars Coleman    MRN: [id number]           Patient Information     Date Of Birth          1950        Visit Information        Provider Department      11/22/2017 9:15 AM Pop Pena MD [facility]        Today's Diagnoses     Type 2 diabetes mellitus without complication, with long-term current use of insulin (H)    -  1       Follow-ups after your visit        Follow-up notes from your care team     Return in about 3 months (around 2/22/2018).      Who to contact     If you have questions or need follow up information about today's clinic visit or your schedule please contact [facility] directly at [phone].  Normal or non-critical lab and imaging results will be communicated to you by [employer] Motion[employer], letter or phone within 4 business days after the clinic has received the results. If you do not hear from us within 7 days, please contact the clinic through ZAPt or phone. If you have a critical or abnormal lab result, we will notify you by phone as soon as possible.  Submit refill requests through [employer] or call your pharmacy and they will forward the refill request to us. Please allow 3 business days for your refill to be completed.          Additional Information About Your Visit        MyChart Information     [employer] gives you secure access to your electronic health record. If you see a primary care provider, you can also send messages to your care team and make appointments. If you have questions, please call your primary care clinic.  If you do not have a primary care provider, please call [phone] and they will assist you.        Care EveryWhere ID     This is your Care EveryWhere ID. This could be used by other organizations to access your [facility] medical records  [id number]        Your Vitals Were     Pulse Temperature Height Pulse Oximetry BMI (Body Mass Index)       83 98.1  F (36.7  " C) (Oral) 1.81 m (5' 11.25\") 96% 28.17 kg/m2        Blood Pressure from Last 3 Encounters:   11/22/17 104/62   08/22/17 134/68   01/03/17 142/74    Weight from Last 3 Encounters:   11/22/17 92.3 kg (203 lb 6.4 oz)   08/22/17 92.5 kg (204 lb)   01/03/17 96.2 kg (212 lb)              We Performed the Following     Albumin Random Urine Quantitative with Creat Ratio     Hemoglobin A1c     VENOUS COLLECTION        Primary Care Provider Office Phone # Fax #    Pop Jimenez Pena [phone] [address] [address]        Equal Access to Services     NEREIDA BRICE : Hadii aad ku hadasho Soomaali, waaxda luqadaha, qaybta kaalmada adeegyada, waxay idiin haymarizoln darrel buck . So [facility] [phone].    ATENCIÓN: Si habla español, tiene a queen disposición servicios gratuitos de asistencia lingüística. [facility] [phone].    We comply with applicable federal civil rights laws and [place] laws. We do not discriminate on the basis of race, color, national origin, age, disability, sex, sexual orientation, or gender identity.            Thank you!     Thank you for choosing [facility]  for your care. Our goal is always to provide you with excellent care. Hearing back from our patients is one way we can continue to improve our services. Please take a few minutes to complete the written survey that you may receive in the mail after your visit with us. Thank you!             Your Updated Medication List - Protect others around you: Learn how to safely use, store and throw away your medicines at www.disposemymeds.org.          This list is accurate as of: 11/22/17  9:44 AM.  Always use your most recent med list.                   Brand Name Dispense Instructions for use Diagnosis    Acetyl-L-Carnitine HCl Powd      daily        aspirin 325 MG EC tablet     100    1 tab po QD (Once per day)        atorvastatin 20 MG tablet    LIPITOR    90 tablet    " Take 1 tablet (20 mg) by mouth daily    Mixed hyperlipidemia       blood glucose monitoring lancets     1 Box    Use to test blood sugar 3 times daily or as directed.    Type 2 diabetes mellitus without complication (H)       blood glucose monitoring meter device kit    no brand specified    1 kit    Ultra Test Meter Use to test blood sugar 4 times daily or as directed.    Type 2 diabetes mellitus without complication, without long-term current use of insulin (H)       blood glucose monitoring test strip    no brand specified    200 strip    Ultra Test Strips Use to test blood sugars 4 times daily or as directed    Type 2 diabetes mellitus without complication, without long-term current use of insulin (H)       GREEN TEA EXTRACT PO      Take by mouth daily        insulin pen needle 31G X 6 MM     100 each    Use once daily or as directed.    Type 2 diabetes mellitus without complication, without long-term current use of insulin (H)       KRILL OIL PO      Take 1 capsule by mouth daily        LANTUS SOLOSTAR 100 UNIT/ML injection   Generic drug:  insulin glargine     15 mL    INJECT 39 UNITS SUBCUTANEOUS AT BEDTIME    Type 2 diabetes mellitus without complication, with long-term current use of insulin (H)       MAGNESIUM PO      Take by mouth daily        metFORMIN 500 MG 24 hr tablet    GLUCOPHAGE-XR    120 tablet    Take 4 tablets (2,000 mg) by mouth daily (with dinner)    Type 2 diabetes mellitus without complication, with long-term current use of insulin (H)       MILK THISTLE PO      Take 1 tablet by mouth daily        NUTRITIONAL SUPPLEMENT PO      Cornitex, Cinsulin, TriSugar Shield, Nurish Green Energy, Amazing Grass Green Superfood        TAURINE PO      Take 1 tablet by mouth daily        TURMERIC PO      Take 1 tablet by mouth daily        VITAMIN C PO      Take by mouth daily        VITAMIN D PO      Take by mouth daily

## 2017-11-27 DIAGNOSIS — E78.2 MIXED HYPERLIPIDEMIA: ICD-10-CM

## 2017-11-27 RX ORDER — ATORVASTATIN CALCIUM 20 MG/1
TABLET, FILM COATED ORAL
Qty: 90 TABLET | Refills: 1 | Status: SHIPPED | OUTPATIENT
Start: 2017-11-27 | End: 2018-05-25

## 2017-12-14 DIAGNOSIS — E11.9 TYPE 2 DIABETES MELLITUS WITHOUT COMPLICATION, WITH LONG-TERM CURRENT USE OF INSULIN (H): ICD-10-CM

## 2017-12-14 DIAGNOSIS — Z79.4 TYPE 2 DIABETES MELLITUS WITHOUT COMPLICATION, WITH LONG-TERM CURRENT USE OF INSULIN (H): ICD-10-CM

## 2017-12-14 RX ORDER — METFORMIN HCL 500 MG
TABLET, EXTENDED RELEASE 24 HR ORAL
Qty: 360 TABLET | Refills: 0 | Status: SHIPPED | OUTPATIENT
Start: 2017-12-14 | End: 2018-02-27

## 2017-12-14 NOTE — TELEPHONE ENCOUNTER
Pending Prescriptions:                       Disp   Refills    metFORMIN (GLUCOPHAGE-XR) 500 MG 24 hr ta*120 ta*0            Sig: TAKE 4 TABLETS BY MOUTH DAILY WITH DINNER    This is in historical  Pt was here on 11- with a a1c done  Please fax change qty  Or send to DONTE Caruso  [phone] (home) [phone] (work)

## 2017-12-28 DIAGNOSIS — Z79.4 TYPE 2 DIABETES MELLITUS WITHOUT COMPLICATION, WITH LONG-TERM CURRENT USE OF INSULIN (H): ICD-10-CM

## 2017-12-28 DIAGNOSIS — E11.9 TYPE 2 DIABETES MELLITUS WITHOUT COMPLICATION, WITH LONG-TERM CURRENT USE OF INSULIN (H): ICD-10-CM

## 2017-12-28 RX ORDER — INSULIN GLARGINE 100 [IU]/ML
INJECTION, SOLUTION SUBCUTANEOUS
Qty: 3 ML | Refills: 0 | Status: SHIPPED | OUTPATIENT
Start: 2017-12-28 | End: 2018-02-09

## 2017-12-28 NOTE — TELEPHONE ENCOUNTER
Pending Prescriptions:                       Disp   Refills    LANTUS SOLOSTAR 100 UNIT/ML soln [Pharmac*                    Sig: INJECT 39 UNITS SUBCUTANEOUS AT BEDTIME    JC please review:    Pt here on  per notes rtc in 3 months.  Would pt be out by now?  Please change qty fax or deny or send to   [phone] (home) [phone] (work)

## 2018-01-09 DIAGNOSIS — E11.9 TYPE 2 DIABETES MELLITUS WITHOUT COMPLICATION, WITHOUT LONG-TERM CURRENT USE OF INSULIN (H): ICD-10-CM

## 2018-01-09 NOTE — TELEPHONE ENCOUNTER
Lars Coleman is requesting a refill of:    Pending Prescriptions:                       Disp   Refills    ONETOUCH ULTRA test strip [Pharmacy Med N*[address] st*9            Sig: TEST BLOOD SUGAR 4 TIMES DAILY    Please close encounter if RX was sent. Thanks, Clara

## 2018-02-09 DIAGNOSIS — E11.9 TYPE 2 DIABETES MELLITUS WITHOUT COMPLICATION, WITH LONG-TERM CURRENT USE OF INSULIN (H): ICD-10-CM

## 2018-02-09 DIAGNOSIS — Z79.4 TYPE 2 DIABETES MELLITUS WITHOUT COMPLICATION, WITH LONG-TERM CURRENT USE OF INSULIN (H): ICD-10-CM

## 2018-02-09 RX ORDER — INSULIN GLARGINE 100 [IU]/ML
INJECTION, SOLUTION SUBCUTANEOUS
Qty: 9 ML | Refills: 0 | Status: SHIPPED | OUTPATIENT
Start: 2018-02-09 | End: 2018-02-27

## 2018-02-09 NOTE — TELEPHONE ENCOUNTER
Patient calling and was told he needs to make an appt so he will call back to schedule    Pending Prescriptions:                       Disp   Refills    LANTUS SOLOSTAR 100 UNIT/ML soln [Pharmac*                    Sig: INJECT 39 UNITS SUBCUTANEOUS AT BEDTIME

## 2018-02-09 NOTE — TELEPHONE ENCOUNTER
Pending Prescriptions:                       Disp   Refills    LANTUS SOLOSTAR 100 UNIT/ML soln [Pharmac*                    Sig: INJECT 39 UNITS SUBCUTANEOUS AT BEDTIME    JCC please see notes in med sig  Pt is due for a fasting ov we have been giving him refills  No appt scheduled  Please change qty fax or deny  Shady  [phone] (home) [phone] (work)

## 2018-02-27 ENCOUNTER — OFFICE VISIT (OUTPATIENT)
Dept: FAMILY MEDICINE | Facility: CLINIC | Age: 68
End: 2018-02-27

## 2018-02-27 VITALS
WEIGHT: 198.4 LBS | HEART RATE: 96 BPM | TEMPERATURE: 99.7 F | SYSTOLIC BLOOD PRESSURE: 132 MMHG | DIASTOLIC BLOOD PRESSURE: 60 MMHG | BODY MASS INDEX: 27.48 KG/M2 | OXYGEN SATURATION: 97 %

## 2018-02-27 DIAGNOSIS — E11.9 TYPE 2 DIABETES MELLITUS WITHOUT COMPLICATION, WITH LONG-TERM CURRENT USE OF INSULIN (H): Primary | ICD-10-CM

## 2018-02-27 DIAGNOSIS — Z79.4 TYPE 2 DIABETES MELLITUS WITHOUT COMPLICATION, WITH LONG-TERM CURRENT USE OF INSULIN (H): Primary | ICD-10-CM

## 2018-02-27 DIAGNOSIS — E78.2 MIXED HYPERLIPIDEMIA: ICD-10-CM

## 2018-02-27 LAB — HBA1C MFR BLD: 7.9 % (ref 4–7)

## 2018-02-27 PROCEDURE — 83036 HEMOGLOBIN GLYCOSYLATED A1C: CPT | Performed by: FAMILY MEDICINE

## 2018-02-27 PROCEDURE — 99213 OFFICE O/P EST LOW 20 MIN: CPT | Performed by: FAMILY MEDICINE

## 2018-02-27 PROCEDURE — 80053 COMPREHEN METABOLIC PANEL: CPT | Mod: 90 | Performed by: FAMILY MEDICINE

## 2018-02-27 PROCEDURE — 84443 ASSAY THYROID STIM HORMONE: CPT | Mod: 90 | Performed by: FAMILY MEDICINE

## 2018-02-27 PROCEDURE — 36415 COLL VENOUS BLD VENIPUNCTURE: CPT | Performed by: FAMILY MEDICINE

## 2018-02-27 PROCEDURE — 80061 LIPID PANEL: CPT | Mod: 90 | Performed by: FAMILY MEDICINE

## 2018-02-27 RX ORDER — METFORMIN HCL 500 MG
TABLET, EXTENDED RELEASE 24 HR ORAL
Qty: 360 TABLET | Refills: 0 | Status: SHIPPED | OUTPATIENT
Start: 2018-02-27 | End: 2018-06-05

## 2018-02-27 RX ORDER — INSULIN GLARGINE 100 [IU]/ML
INJECTION, SOLUTION SUBCUTANEOUS
COMMUNITY
Start: 2018-02-10 | End: 2018-03-17

## 2018-02-27 NOTE — NURSING NOTE
Lars is here for A1c and med check    Pre-visit Screening:  Immunizations:  up to date  Colonoscopy:  is up to date  Mammogram: NA  Asthma Action Test/Plan:  ANNIA  PHQ9:  NA  GAD7:  NA  Questioned patient about current smoking habits Pt. has never smoked.  Ok to leave detailed message on voice mail for today's visit only Yes, phone # [phone]

## 2018-02-27 NOTE — PROGRESS NOTES
SUBJECTIVE:                                                    Lars Coleman is a 67 year old male who presents to clinic today for the following health issues:      Diabetes Porwef-bn-lhu switched to Basaglar due to insurance 3 weeks ago- 34 units    Patient is checking blood sugars: once daily.  Results are as follows:         am - 140's    Diabetic concerns: None     Symptoms of hypoglycemia (low blood sugar): none     Paresthesias (numbness or burning in feet) or sores: No     Date of last diabetic eye exam: 8/17    BP Readings from Last 2 Encounters:   02/27/18 132/60   11/22/17 104/62     Hemoglobin A1C (%)   Date Value   11/22/2017 7.6 (A)   08/22/2017 8.3 (A)     LDL Cholesterol Calculated (mg/dL (calc))   Date Value   08/22/2017 91   01/03/2017 71     Hyperlipidemia Follow-Up      Rate your low fat/cholesterol diet?: good    Taking statin?  Yes, no muscle aches from statin    Other lipid medications/supplements?:  none      Amount of exercise or physical activity: 1 day/week for an average of 30-45 minutes    Problems taking medications regularly: No    Medication side effects: none    Diet: regular (no restrictions)        Pt not sure basaglar working as well    Problem list and histories reviewed & adjusted, as indicated.  Additional history: as documented    Patient Active Problem List   Diagnosis     Mixed hyperlipidemia     Family history of malignant neoplasm of gastrointestinal tract     Calculus of kidney     ACP (advance care planning)     Health Care Home     Type 2 diabetes mellitus without complication, with long-term current use of insulin (H)     Past Surgical History:   Procedure Laterality Date     APPENDECTOMY       HC VASECTOMY UNILAT/BILAT W POSTOP SEMEN      Vasectomy       Social History   Substance Use Topics     Smoking status: Never Smoker     Smokeless tobacco: Never Used     Alcohol use 0.0 oz/week     0 Standard drinks or equivalent per week      Comment: rare     Family  History   Problem Relation Age of Onset     Cancer - colorectal Father      rectal cancer     C.A.D. No family hx of      DIABETES Brother      Prostate Cancer No family hx of      CEREBROVASCULAR DISEASE Father      in his 40s (smoker)         Current Outpatient Prescriptions   Medication Sig Dispense Refill     BASAGLAR 100 UNIT/ML injection        metFORMIN (GLUCOPHAGE-XR) 500 MG 24 hr tablet TAKE 4 TABLETS BY MOUTH DAILY WITH DINNER 360 tablet 0     ONETOUCH ULTRA test strip TEST BLOOD SUGAR 4 TIMES DAILY 200 strip 9     atorvastatin (LIPITOR) 20 MG tablet TAKE 1 TABLET (20 MG) BY MOUTH DAILY 90 tablet 1     Nutritional Supplements (NUTRITIONAL SUPPLEMENT PO) Cornitex, Cinsulin, TriSugar Shield, Nurish Green Energy, Amazing Grass Green Superfood       TAURINE PO Take 1 tablet by mouth daily       MILK THISTLE PO Take 1 tablet by mouth daily       KRILL OIL PO Take 1 capsule by mouth daily       TURMERIC PO Take 1 tablet by mouth daily       Acetylcarnitine HCl (ACETYL-L-CARNITINE HCL) POWD daily       Cholecalciferol (VITAMIN D PO) Take by mouth daily       MAGNESIUM PO Take by mouth daily       Ascorbic Acid (VITAMIN C PO) Take by mouth daily       Green Tea, Camillia sinensis, (GREEN TEA EXTRACT PO) Take by mouth daily       insulin pen needle 31G X 6 MM Use once daily or as directed. 100 each prn     blood glucose monitoring (NO BRAND SPECIFIED) meter device kit Ultra Test Meter  Use to test blood sugar 4 times daily or as directed. 1 kit 0     blood glucose monitoring (ACCU-CHEK MULTICLIX) lancets Use to test blood sugar 3 times daily or as directed. 1 Box 11     ASPIRIN TBEC 325 MG OR 1 tab po QD (Once per day) 100 3     [DISCONTINUED] metFORMIN (GLUCOPHAGE-XR) 500 MG 24 hr tablet TAKE 4 TABLETS BY MOUTH DAILY WITH DINNER 360 tablet 0     Allergies   Allergen Reactions     No Known Drug Allergies      Recent Labs   Lab Test  02/27/18   0922  11/22/17   0932  08/22/17   1124  08/22/17   1053  01/03/17   0934    07/12/16   0940   A1C  7.9*  7.6*   --   8.3*   --    < >   --    LDL   --    --   91   --   71   --   130*   HDL   --    --   30*   --   31*   --   30*   TRIG   --    --   242*   --   189*   --   317*   ALT   --    --   21   --   25   --   20   CR   --    --   0.87   --   0.86   --   0.88   GFRESTIMATED   --    --   90   --   91   --   91   POTASSIUM   --    --   4.5   --   4.1   --   4.3    < > = values in this interval not displayed.      BP Readings from Last 3 Encounters:   02/27/18 132/60   11/22/17 104/62   08/22/17 134/68    Wt Readings from Last 3 Encounters:   02/27/18 90 kg (198 lb 6.4 oz)   11/22/17 92.3 kg (203 lb 6.4 oz)   08/22/17 92.5 kg (204 lb)                    ROS:  Constitutional, HEENT, cardiovascular, pulmonary, gi and gu systems are negative, except as otherwise noted.    OBJECTIVE:     /60 (BP Location: Left arm, Patient Position: Chair, Cuff Size: Adult Large)  Pulse 96  Temp 99.7  F (37.6  C) (Oral)  Wt 90 kg (198 lb 6.4 oz)  SpO2 97%  BMI 27.48 kg/m2  Body mass index is 27.48 kg/(m^2).   GENERAL: healthy, alert and no distress  EYES: Eyes grossly normal to inspection, PERRL and conjunctivae and sclerae normal  HENT: ear canals and TM's normal, nose and mouth without ulcers or lesions  NECK: no adenopathy, no asymmetry, masses, or scars and thyroid normal to palpation  RESP: lungs clear to auscultation - no rales, rhonchi or wheezes  CV: regular rate and rhythm, normal S1 S2, no S3 or S4, no murmur, click or rub, no peripheral edema and peripheral pulses strong  ABDOMEN: soft, nontender, no hepatosplenomegaly, no masses and bowel sounds normal  NEURO: Normal strength and tone, mentation intact and speech normal  PSYCH: mentation appears normal, affect normal/bright  Diabetic foot exam: normal DP and PT pulses, no trophic changes or ulcerative lesions and normal sensory exam    Diagnostic Test Results:  Results for orders placed or performed in visit on 02/27/18 (from the past  "24 hour(s))   Hemoglobin A1c   Result Value Ref Range    Hemoglobin A1C 7.9 (A) 4.0 - 7.0 %       ASSESSMENT:       PLAN:   (E11.9,  Z79.4) Type 2 diabetes mellitus without complication, with long-term current use of insulin (H)  (primary encounter diagnosis)  Comment: suboptimal control-will increase insulin to 37 units  Plan: Hemoglobin A1c, VENOUS COLLECTION,         COMPREHENSIVE METABOLIC PANEL ([facility]) XCMP, TSH        with free T4 reflex ([facility])        Increase to 37 units, continue others, Continue to work on healthy diet and exercise, discussed healthy habits     (E78.2) Mixed hyperlipidemia  Comment: controlled  Plan: Lipid Profile ([facility]), COMPREHENSIVE METABOLIC         PANEL ([facility]) XCMP        continue current medications at current doses       BMI:   Estimated body mass index is 27.48 kg/(m^2) as calculated from the following:    Height as of 11/22/17: 1.81 m (5' 11.25\").    Weight as of this encounter: 90 kg (198 lb 6.4 oz).   Weight management plan: Discussed healthy diet and exercise guidelines and patient will follow up in 3 months in clinic to re-evaluate.      FUTURE APPOINTMENTS:       - Follow-up visit in 3 mo  Work on weight loss  Regular exercise    Pop Pena MD  [facility]      "

## 2018-02-27 NOTE — MR AVS SNAPSHOT
After Visit Summary   2/27/2018    Lars Coleman    MRN: [id number]           Patient Information     Date Of Birth          1950        Visit Information        Provider Department      2/27/2018 9:00 AM Pop Pena MD [facility]        Today's Diagnoses     Type 2 diabetes mellitus without complication, with long-term current use of insulin (H)    -  1    Mixed hyperlipidemia           Follow-ups after your visit        Follow-up notes from your care team     Return in about 3 months (around 5/27/2018).      Who to contact     If you have questions or need follow up information about today's clinic visit or your schedule please contact [facility] directly at [phone].  Normal or non-critical lab and imaging results will be communicated to you by Ubiq Mobilehart, letter or phone within 4 business days after the clinic has received the results. If you do not hear from us within 7 days, please contact the clinic through Atoshot or phone. If you have a critical or abnormal lab result, we will notify you by phone as soon as possible.  Submit refill requests through Baileyu or call your pharmacy and they will forward the refill request to us. Please allow 3 business days for your refill to be completed.          Additional Information About Your Visit        MyChart Information     Baileyu gives you secure access to your electronic health record. If you see a primary care provider, you can also send messages to your care team and make appointments. If you have questions, please call your primary care clinic.  If you do not have a primary care provider, please call [phone] and they will assist you.        Care EveryWhere ID     This is your Care EveryWhere ID. This could be used by other organizations to access your [place] medical records  [id number]        Your Vitals Were     Pulse Temperature Pulse Oximetry BMI (Body Mass Index)           96 99.7  F (37.6  C) (Oral) 97% 27.48 kg/m2         Blood Pressure from Last 3 Encounters:   02/27/18 132/60   11/22/17 104/62   08/22/17 134/68    Weight from Last 3 Encounters:   02/27/18 90 kg (198 lb 6.4 oz)   11/22/17 92.3 kg (203 lb 6.4 oz)   08/22/17 92.5 kg (204 lb)              We Performed the Following     COMPREHENSIVE METABOLIC PANEL (QUEST) XCMP     Hemoglobin A1c     Lipid Profile (QUEST)     TSH with free T4 reflex (QUEST)     VENOUS COLLECTION          Today's Medication Changes          These changes are accurate as of 2/27/18  9:26 AM.  If you have any questions, ask your nurse or doctor.               These medicines have changed or have updated prescriptions.        Dose/Directions    metFORMIN 500 MG 24 hr tablet   Commonly known as:  GLUCOPHAGE-XR   This may have changed:  See the new instructions.   Used for:  Type 2 diabetes mellitus without complication, with long-term current use of insulin (H)   Changed by:  Pop Pena MD        TAKE 4 TABLETS BY MOUTH DAILY WITH DINNER   Quantity:  360 tablet   Refills:  0            Where to get your medicines      These medications were sent to [place] [address] [place] [facility] [place], [place] - [address]  [address] [facility] [address]     Phone:  [phone]     metFORMIN 500 MG 24 hr tablet                Primary Care Provider Office Phone # Fax #    Pop Pena [phone] [address]  [place] [address]        Equal Access to Services     CARMELO BRICE AH: Hadii aad ku hadasho Soomaali, waaxda luqadaha, qaybta kaalmada adeegyada, waxay arlettein haydheeraj york. So [facility] [phone].    ATENCIÓN: Si habla español, tiene a queen disposición servicios gratuitos de asistencia lingüística. Llame al [phone].    We comply with applicable federal civil rights laws and [place] laws. We do not discriminate on the basis of race, color, national origin, age, disability, sex,  sexual orientation, or gender identity.            Thank you!     Thank you for choosing [facility]  for your care. Our goal is always to provide you with excellent care. Hearing back from our patients is one way we can continue to improve our services. Please take a few minutes to complete the written survey that you may receive in the mail after your visit with us. Thank you!             Your Updated Medication List - Protect others around you: Learn how to safely use, store and throw away your medicines at www.disposemymeds.org.          This list is accurate as of 2/27/18  9:26 AM.  Always use your most recent med list.                   Brand Name Dispense Instructions for use Diagnosis    Acetyl-L-Carnitine HCl Powd      daily        aspirin 325 MG EC tablet     100    1 tab po QD (Once per day)        atorvastatin 20 MG tablet    LIPITOR    90 tablet    TAKE 1 TABLET (20 MG) BY MOUTH DAILY    Mixed hyperlipidemia       BASAGLAR 100 UNIT/ML injection           blood glucose monitoring lancets     1 Box    Use to test blood sugar 3 times daily or as directed.    Type 2 diabetes mellitus without complication (H)       blood glucose monitoring meter device kit    no brand specified    1 kit    Ultra Test Meter Use to test blood sugar 4 times daily or as directed.    Type 2 diabetes mellitus without complication, without long-term current use of insulin (H)       GREEN TEA EXTRACT PO      Take by mouth daily        insulin pen needle 31G X 6 MM     100 each    Use once daily or as directed.    Type 2 diabetes mellitus without complication, without long-term current use of insulin (H)       KRILL OIL PO      Take 1 capsule by mouth daily        MAGNESIUM PO      Take by mouth daily        metFORMIN 500 MG 24 hr tablet    GLUCOPHAGE-XR    360 tablet    TAKE 4 TABLETS BY MOUTH DAILY WITH DINNER    Type 2 diabetes mellitus without complication, with long-term current use of insulin (H)        MILK THISTLE PO      Take 1 tablet by mouth daily        NUTRITIONAL SUPPLEMENT PO      Cornitex, Cinsulin, TriSugar Shield, Nurish Green Energy, Amazing Grass Green Superfood        ONETOUCH ULTRA test strip   Generic drug:  blood glucose monitoring     200 strip    TEST BLOOD SUGAR 4 TIMES DAILY    Type 2 diabetes mellitus without complication, without long-term current use of insulin (H)       TAURINE PO      Take 1 tablet by mouth daily        TURMERIC PO      Take 1 tablet by mouth daily        VITAMIN C PO      Take by mouth daily        VITAMIN D PO      Take by mouth daily

## 2018-02-28 LAB
ALBUMIN SERPL-MCNC: 4.6 G/DL (ref 3.6–5.1)
ALBUMIN/GLOB SERPL: 1.4 (CALC) (ref 1–2.5)
ALP SERPL-CCNC: 75 U/L (ref 40–115)
ALT SERPL-CCNC: 20 U/L (ref 9–46)
AST SERPL-CCNC: 18 U/L (ref 10–35)
BILIRUB SERPL-MCNC: 0.8 MG/DL (ref 0.2–1.2)
BUN SERPL-MCNC: 18 MG/DL (ref 7–25)
BUN/CREATININE RATIO: ABNORMAL (CALC) (ref 6–22)
CALCIUM SERPL-MCNC: 9.7 MG/DL (ref 8.6–10.3)
CHLORIDE SERPLBLD-SCNC: 101 MMOL/L (ref 98–110)
CHOLEST SERPL-MCNC: 135 MG/DL
CHOLEST/HDLC SERPL: 3.8 (CALC)
CO2 SERPL-SCNC: 26 MMOL/L (ref 20–31)
CREAT SERPL-MCNC: 0.85 MG/DL (ref 0.7–1.25)
EGFR AFRICAN AMERICAN - QUEST: 105 ML/MIN/1.73M2
GFR SERPL CREATININE-BSD FRML MDRD: 91 ML/MIN/1.73M2
GLOBULIN, CALCULATED - QUEST: 3.2 G/DL (CALC) (ref 1.9–3.7)
GLUCOSE - QUEST: 170 MG/DL (ref 65–99)
HDLC SERPL-MCNC: 36 MG/DL
LDLC SERPL CALC-MCNC: 76 MG/DL (CALC)
NONHDLC SERPL-MCNC: 99 MG/DL (CALC)
POTASSIUM SERPL-SCNC: 4.2 MMOL/L (ref 3.5–5.3)
PROT SERPL-MCNC: 7.8 G/DL (ref 6.1–8.1)
SODIUM SERPL-SCNC: 140 MMOL/L (ref 135–146)
TRIGL SERPL-MCNC: 146 MG/DL
TSH SERPL-ACNC: 2.13 MIU/L (ref 0.4–4.5)

## 2018-03-17 DIAGNOSIS — Z79.4 TYPE 2 DIABETES MELLITUS WITHOUT COMPLICATION, WITH LONG-TERM CURRENT USE OF INSULIN (H): Primary | ICD-10-CM

## 2018-03-17 DIAGNOSIS — E11.9 TYPE 2 DIABETES MELLITUS WITHOUT COMPLICATION, WITH LONG-TERM CURRENT USE OF INSULIN (H): Primary | ICD-10-CM

## 2018-03-17 NOTE — TELEPHONE ENCOUNTER
We received a refill request for the following    Pending Prescriptions:                       Disp   Refills    BASAGLAR 100 UNIT/ML injection [Pharmacy *       0            Sig: INJECT 39 UNITS SUBCUTANEOUSLY NIGHTLY AT BEDTIME    Medication on last office visit 2-27-18 says Basaglar 34 units, this RX is for 39 units.     Please review  Marlena

## 2018-03-19 RX ORDER — INSULIN GLARGINE 100 [IU]/ML
INJECTION, SOLUTION SUBCUTANEOUS
Qty: 15 ML | Refills: 0 | Status: SHIPPED | OUTPATIENT
Start: 2018-03-19 | End: 2018-04-22

## 2018-04-22 DIAGNOSIS — Z79.4 TYPE 2 DIABETES MELLITUS WITHOUT COMPLICATION, WITH LONG-TERM CURRENT USE OF INSULIN (H): ICD-10-CM

## 2018-04-22 DIAGNOSIS — E11.9 TYPE 2 DIABETES MELLITUS WITHOUT COMPLICATION, WITH LONG-TERM CURRENT USE OF INSULIN (H): ICD-10-CM

## 2018-04-23 RX ORDER — INSULIN GLARGINE 100 [IU]/ML
INJECTION, SOLUTION SUBCUTANEOUS
Qty: 15 ML | Refills: 0 | Status: SHIPPED | OUTPATIENT
Start: 2018-04-23 | End: 2018-06-02

## 2018-04-23 NOTE — TELEPHONE ENCOUNTER
Lars Coleman is requesting a refill of:    Pending Prescriptions:                       Disp   Refills    BASAGLAR 100 UNIT/ML injection [Pharmacy *15 mL  0            Sig: INJECT 39 UNITS SUBCUTANEOUSLY NIGHTLY AT BEDTIME    SIG states 39u, but per oast OV he was to: Increase to 37 units, continue others, Continue to work on healthy diet and exercise, discussed healthy habits     He is also due for a diabetic check in May

## 2018-05-25 ENCOUNTER — TELEPHONE (OUTPATIENT)
Dept: FAMILY MEDICINE | Facility: CLINIC | Age: 68
End: 2018-05-25

## 2018-05-25 DIAGNOSIS — E78.2 MIXED HYPERLIPIDEMIA: ICD-10-CM

## 2018-05-25 RX ORDER — ATORVASTATIN CALCIUM 20 MG/1
TABLET, FILM COATED ORAL
Qty: 90 TABLET | Refills: 1 | Status: SHIPPED | OUTPATIENT
Start: 2018-05-25 | End: 2018-11-18

## 2018-05-25 NOTE — TELEPHONE ENCOUNTER
Patient is requesting a refill of   Pending Prescriptions:                       Disp   Refills    atorvastatin (LIPITOR) 20 MG tablet [Phar*90 tab*1            Sig: TAKE 1 TABLET BY MOUTH DAILY    He was last seen on 2/27/18 for hyperlipidemia and was told at this visit to follow up in 3 months. Do you want to give refill or should we call the patient to set up an appointment first? Routing to Dr. Pena for review. Please advise.

## 2018-05-27 DIAGNOSIS — E11.9 TYPE 2 DIABETES MELLITUS WITHOUT COMPLICATION, WITHOUT LONG-TERM CURRENT USE OF INSULIN (H): ICD-10-CM

## 2018-05-29 RX ORDER — FLURBIPROFEN SODIUM 0.3 MG/ML
SOLUTION/ DROPS OPHTHALMIC
Qty: 100 EACH | Refills: 1 | Status: SHIPPED | OUTPATIENT
Start: 2018-05-29 | End: 2019-05-06

## 2018-05-29 NOTE — TELEPHONE ENCOUNTER
Refill Request Received:    Pending Prescriptions:                       Disp   Refills    ULTICARE MINI 31G X 6 MM insulin pen need*                    Sig: USE ONCE DAILY OR AS DIRECTED.    Last Refill: 1/03/2017  Last OV: 2/27/2018  Recheck: 3 months  Scheduled Office Visit: None Scheduled    Please Close Encounter If Approved.    Thank You,  Minnie Lozada CMA

## 2018-06-02 DIAGNOSIS — E11.9 TYPE 2 DIABETES MELLITUS WITHOUT COMPLICATION, WITH LONG-TERM CURRENT USE OF INSULIN (H): ICD-10-CM

## 2018-06-02 DIAGNOSIS — Z79.4 TYPE 2 DIABETES MELLITUS WITHOUT COMPLICATION, WITH LONG-TERM CURRENT USE OF INSULIN (H): ICD-10-CM

## 2018-06-02 NOTE — TELEPHONE ENCOUNTER
Lars Coleman is requesting a refill of:    Pending Prescriptions:                       Disp   Refills    BASAGLAR 100 UNIT/ML injection [Pharmacy *       0            Sig: INJECT 39 UNITS SUBCUTANEOUSLY NIGHTLY AT BEDTIME    Pt has OV on 6/7/18  ThanksClara

## 2018-06-04 DIAGNOSIS — Z79.4 TYPE 2 DIABETES MELLITUS WITHOUT COMPLICATION, WITH LONG-TERM CURRENT USE OF INSULIN (H): ICD-10-CM

## 2018-06-04 DIAGNOSIS — E11.9 TYPE 2 DIABETES MELLITUS WITHOUT COMPLICATION, WITH LONG-TERM CURRENT USE OF INSULIN (H): ICD-10-CM

## 2018-06-04 RX ORDER — INSULIN GLARGINE 100 [IU]/ML
INJECTION, SOLUTION SUBCUTANEOUS
Qty: 15 ML | Refills: 0 | Status: SHIPPED | OUTPATIENT
Start: 2018-06-04 | End: 2018-06-07

## 2018-06-05 DIAGNOSIS — E11.9 TYPE 2 DIABETES MELLITUS WITHOUT COMPLICATION, WITH LONG-TERM CURRENT USE OF INSULIN (H): ICD-10-CM

## 2018-06-05 DIAGNOSIS — Z79.4 TYPE 2 DIABETES MELLITUS WITHOUT COMPLICATION, WITH LONG-TERM CURRENT USE OF INSULIN (H): ICD-10-CM

## 2018-06-05 RX ORDER — METFORMIN HCL 500 MG
TABLET, EXTENDED RELEASE 24 HR ORAL
Qty: 360 TABLET | Refills: 0 | OUTPATIENT
Start: 2018-06-05

## 2018-06-05 RX ORDER — METFORMIN HCL 500 MG
TABLET, EXTENDED RELEASE 24 HR ORAL
Qty: 120 TABLET | Refills: 0 | COMMUNITY
Start: 2018-06-05 | End: 2018-06-07

## 2018-06-05 NOTE — TELEPHONE ENCOUNTER
We received a refill request for the following    Refused Prescriptions:                       Disp   Refills    metFORMIN (GLUCOPHAGE-XR) 500 MG 24 hr tab*360 ta*0        Sig: TAKE 4 TABLETS BY MOUTH DAILY WITH DINNER  Refused By: DHARMESH HODGES  Reason for Refusal: OTHER  Reason for Refusal Comment: pt has appointment for 6-7-18,will send rx then    Pt has a appointment Thursday so request was denied until he comes to appointment

## 2018-06-07 ENCOUNTER — OFFICE VISIT (OUTPATIENT)
Dept: FAMILY MEDICINE | Facility: CLINIC | Age: 68
End: 2018-06-07

## 2018-06-07 VITALS
BODY MASS INDEX: 27.36 KG/M2 | WEIGHT: 202 LBS | HEIGHT: 72 IN | TEMPERATURE: 98.4 F | OXYGEN SATURATION: 95 % | SYSTOLIC BLOOD PRESSURE: 120 MMHG | DIASTOLIC BLOOD PRESSURE: 68 MMHG | HEART RATE: 89 BPM

## 2018-06-07 DIAGNOSIS — E11.9 TYPE 2 DIABETES MELLITUS WITHOUT COMPLICATION, WITH LONG-TERM CURRENT USE OF INSULIN (H): Primary | ICD-10-CM

## 2018-06-07 DIAGNOSIS — Z79.4 TYPE 2 DIABETES MELLITUS WITHOUT COMPLICATION, WITH LONG-TERM CURRENT USE OF INSULIN (H): Primary | ICD-10-CM

## 2018-06-07 DIAGNOSIS — E78.2 MIXED HYPERLIPIDEMIA: ICD-10-CM

## 2018-06-07 LAB — HBA1C MFR BLD: 8.1 % (ref 4–7)

## 2018-06-07 PROCEDURE — 99213 OFFICE O/P EST LOW 20 MIN: CPT | Performed by: FAMILY MEDICINE

## 2018-06-07 PROCEDURE — 36415 COLL VENOUS BLD VENIPUNCTURE: CPT | Performed by: FAMILY MEDICINE

## 2018-06-07 PROCEDURE — 83036 HEMOGLOBIN GLYCOSYLATED A1C: CPT | Performed by: FAMILY MEDICINE

## 2018-06-07 RX ORDER — METFORMIN HCL 500 MG
TABLET, EXTENDED RELEASE 24 HR ORAL
Qty: 360 TABLET | Refills: 0 | Status: SHIPPED | OUTPATIENT
Start: 2018-06-07 | End: 2018-08-23

## 2018-06-07 RX ORDER — INSULIN GLARGINE 100 [IU]/ML
42 INJECTION, SOLUTION SUBCUTANEOUS AT BEDTIME
Qty: 15 ML | Refills: 0 | Status: SHIPPED | OUTPATIENT
Start: 2018-06-07 | End: 2018-08-01

## 2018-06-07 NOTE — PROGRESS NOTES
SUBJECTIVE:                                                    Lars Coleman is a 67 year old male who presents to clinic today for the following health issues:      Diabetes [id number]-[id number] 39 u Basaglar, metformin 2000 qd    Patient is checking blood sugars: once daily.  Results are as follows:         am - 114-200, much better when eating well and exercising but this has been up and down    Diabetic concerns: None     Symptoms of hypoglycemia (low blood sugar): none     Paresthesias (numbness or burning in feet) or sores: No     Date of last diabetic eye exam: 8/17    BP Readings from Last 2 Encounters:   06/07/18 120/68   02/27/18 132/60     Hemoglobin A1C (%)   Date Value   02/27/2018 7.9 (A)   11/22/2017 7.6 (A)     LDL Cholesterol Calculated (mg/dL (calc))   Date Value   02/27/2018 76   08/22/2017 91     Hyperlipidemia Follow-Up      Rate your low fat/cholesterol diet?: good    Taking statin?  Yes, no muscle aches from statin    Other lipid medications/supplements?:  none      Amount of exercise or physical activity: 2-3 days/week for an average of 30-45 minutes    Problems taking medications regularly: No    Medication side effects: none    Diet: regular (no restrictions)            Problem list and histories reviewed & adjusted, as indicated.  Additional history: as documented    Patient Active Problem List   Diagnosis     Mixed hyperlipidemia     Family history of malignant neoplasm of gastrointestinal tract     Calculus of kidney     ACP (advance care planning)     Health Care Home     Type 2 diabetes mellitus without complication, with long-term current use of insulin (H)     Past Surgical History:   Procedure Laterality Date     APPENDECTOMY       HC VASECTOMY UNILAT/BILAT W POSTOP SEMEN      Vasectomy       Social History   Substance Use Topics     Smoking status: Never Smoker     Smokeless tobacco: Never Used     Alcohol use 0.0 oz/week     0 Standard drinks or equivalent per week      Comment: rare      Family History   Problem Relation Age of Onset     Cancer - colorectal Father      rectal cancer     C.A.D. No family hx of      DIABETES Brother      Prostate Cancer No family hx of      CEREBROVASCULAR DISEASE Father      in his 40s (smoker)         Current Outpatient Prescriptions   Medication Sig Dispense Refill     Acetylcarnitine HCl (ACETYL-L-CARNITINE HCL) POWD daily       Ascorbic Acid (VITAMIN C PO) Take by mouth daily       ASPIRIN TBEC 325 MG OR 1 tab po QD (Once per day) 100 3     atorvastatin (LIPITOR) 20 MG tablet TAKE 1 TABLET BY MOUTH DAILY 90 tablet 1     BASAGLAR 100 UNIT/ML injection INJECT 39 UNITS SUBCUTANEOUSLY NIGHTLY AT BEDTIME 15 mL 0     blood glucose monitoring (ACCU-CHEK MULTICLIX) lancets Use to test blood sugar 3 times daily or as directed. 1 Box 11     blood glucose monitoring (NO BRAND SPECIFIED) meter device kit Ultra Test Meter  Use to test blood sugar 4 times daily or as directed. 1 kit 0     Cholecalciferol (VITAMIN D PO) Take by mouth daily       Green Tea, Camillia sinensis, (GREEN TEA EXTRACT PO) Take by mouth daily       KRILL OIL PO Take 1 capsule by mouth daily       MAGNESIUM PO Take by mouth daily       metFORMIN (GLUCOPHAGE-XR) 500 MG 24 hr tablet TAKE 4 TABLETS BY MOUTH DAILY WITH DINNER 120 tablet 0     MILK THISTLE PO Take 1 tablet by mouth daily       Nutritional Supplements (NUTRITIONAL SUPPLEMENT PO) Cornitex, Cinsulin, TriSugar Shield, Nurish Green Energy, Amazing Grass Green Superfood       ONETOUCH ULTRA test strip TEST BLOOD SUGAR 4 TIMES DAILY 200 strip 9     TAURINE PO Take 1 tablet by mouth daily       TURMERIC PO Take 1 tablet by mouth daily       ULTICARE MINI 31G X 6 MM insulin pen needle USE ONCE DAILY OR AS DIRECTED. 100 each 1     Allergies   Allergen Reactions     No Known Drug Allergies      Recent Labs   Lab Test  06/07/18   0944  02/27/18   0931  02/27/18   0922  11/22/17   0932  08/22/17   1124   01/03/17   0934   A1C  8.1*   --   7.9*  7.6*    "--    < >   --    LDL   --   76   --    --   91   --   71   HDL   --   36*   --    --   30*   --   31*   TRIG   --   146   --    --   242*   --   189*   ALT   --   20   --    --   21   --   25   CR   --   0.85   --    --   0.87   --   0.86   GFRESTIMATED   --   91   --    --   90   --   91   POTASSIUM   --   4.2   --    --   4.5   --   4.1   TSH   --   2.13   --    --    --    --    --     < > = values in this interval not displayed.      BP Readings from Last 3 Encounters:   06/07/18 120/68   02/27/18 132/60   11/22/17 104/62    Wt Readings from Last 3 Encounters:   06/07/18 91.6 kg (202 lb)   02/27/18 90 kg (198 lb 6.4 oz)   11/22/17 92.3 kg (203 lb 6.4 oz)                    ROS:  Constitutional, HEENT, cardiovascular, pulmonary, gi and gu systems are negative, except as otherwise noted.    OBJECTIVE:     /68 (BP Location: Right arm, Patient Position: Chair, Cuff Size: Adult Large)  Pulse 89  Temp 98.4  F (36.9  C) (Oral)  Ht 1.816 m (5' 11.5\")  Wt 91.6 kg (202 lb)  SpO2 95%  BMI 27.78 kg/m2  Body mass index is 27.78 kg/(m^2).   GENERAL: healthy, alert and no distress  NECK: no adenopathy, no asymmetry, masses, or scars and thyroid normal to palpation  RESP: lungs clear to auscultation - no rales, rhonchi or wheezes  CV: regular rate and rhythm, normal S1 S2, no S3 or S4, no murmur, click or rub, no peripheral edema and peripheral pulses strong  ABDOMEN: soft, nontender, no hepatosplenomegaly, no masses and bowel sounds normal  MS: no gross musculoskeletal defects noted, no edema  Diabetic foot exam: normal DP and PT pulses, no trophic changes or ulcerative lesions and normal sensory exam    Diagnostic Test Results:  Results for orders placed or performed in visit on 06/07/18 (from the past 24 hour(s))   Hemoglobin A1c   Result Value Ref Range    Hemoglobin A1C 8.1 (A) 4.0 - 7.0 %       ASSESSMENT:       PLAN:   (E11.9,  Z79.4) Type 2 diabetes mellitus without complication, with long-term current use " "of insulin (H)  (primary encounter diagnosis)  Comment: suboptimal control, am sugars high-not regularly exercising  Plan: metFORMIN (GLUCOPHAGE-XR) 500 MG 24 hr tablet,         Hemoglobin A1c, VENOUS COLLECTION        Increase to 42 units basaglar, continue metformin, exercise regularly,     (E78.2) Mixed hyperlipidemia  Comment: controlled  Plan: continue current medications at current doses     BMI:   Estimated body mass index is 27.78 kg/(m^2) as calculated from the following:    Height as of this encounter: 1.816 m (5' 11.5\").    Weight as of this encounter: 91.6 kg (202 lb).   Weight management plan: Discussed healthy diet and exercise guidelines and patient will follow up in 3 months in clinic to re-evaluate.      FUTURE APPOINTMENTS:       - Follow-up visit in 3 mo  Work on weight loss  Regular exercise    Pop Pena MD  [facility]      "

## 2018-06-07 NOTE — MR AVS SNAPSHOT
After Visit Summary   6/7/2018    Lars Coleman    MRN: [id number]           Patient Information     Date Of Birth          1950        Visit Information        Provider Department      6/7/2018 9:30 AM Pop Pena MD [facility]        Today's Diagnoses     Type 2 diabetes mellitus without complication, with long-term current use of insulin (H)    -  1    Mixed hyperlipidemia           Follow-ups after your visit        Follow-up notes from your care team     Return in about 3 months (around 9/7/2018).      Who to contact     If you have questions or need follow up information about today's clinic visit or your schedule please contact [facility] directly at [phone].  Normal or non-critical lab and imaging results will be communicated to you by Dhir Diamondshart, letter or phone within 4 business days after the clinic has received the results. If you do not hear from us within 7 days, please contact the clinic through Rightware Oyt or phone. If you have a critical or abnormal lab result, we will notify you by phone as soon as possible.  Submit refill requests through AmSafe or call your pharmacy and they will forward the refill request to us. Please allow 3 business days for your refill to be completed.          Additional Information About Your Visit        MyChart Information     AmSafe gives you secure access to your electronic health record. If you see a primary care provider, you can also send messages to your care team and make appointments. If you have questions, please call your primary care clinic.  If you do not have a primary care provider, please call [phone] and they will assist you.        Care EveryWhere ID     This is your Care EveryWhere ID. This could be used by other organizations to access your [place] medical records  [id number]        Your Vitals Were     Pulse Temperature Height Pulse Oximetry BMI (Body Mass Index)  "      89 98.4  F (36.9  C) (Oral) 1.816 m (5' 11.5\") 95% 27.78 kg/m2        Blood Pressure from Last 3 Encounters:   06/07/18 120/68   02/27/18 132/60   11/22/17 104/62    Weight from Last 3 Encounters:   06/07/18 91.6 kg (202 lb)   02/27/18 90 kg (198 lb 6.4 oz)   11/22/17 92.3 kg (203 lb 6.4 oz)              We Performed the Following     Hemoglobin A1c     VENOUS COLLECTION          Today's Medication Changes          These changes are accurate as of 6/7/18  9:47 AM.  If you have any questions, ask your nurse or doctor.               These medicines have changed or have updated prescriptions.        Dose/Directions    BASAGLAR 100 UNIT/ML injection   This may have changed:  See the new instructions.   Used for:  Type 2 diabetes mellitus without complication, with long-term current use of insulin (H)   Changed by:  Pop Pena MD        Dose:  42 Units   Inject 42 Units Subcutaneous At Bedtime   Quantity:  15 mL   Refills:  0            Where to get your medicines      These medications were sent to [place] [address] [place] [facility] [address] [facility]  [address] [facility] [address]     Phone:  [phone]     metFORMIN 500 MG 24 hr tablet         Call your pharmacy to confirm that your medication is ready for pickup. It may take up to 24 hours for them to receive the prescription. If the prescription is not ready within 3 business days, please contact your clinic or your provider.     We will let you know when these medications are ready. If you don't hear back within 3 business days, please contact us.     BASAGLAR 100 UNIT/ML injection                Primary Care Provider Office Phone # Fax #    Pop Pena [phone] [address] [facility] [address]  [facility] [address]        Equal Access to Services     NEREIDA BRICE AH: Hadii katheryn lewis hadasho Soomaali, waaxda luqadaha, qaybta kaalmada adeegyada, jarocho york. So wa " [phone].    ATENCIÓN: Si jam lauren, tiene a queen disposición servicios gratuitos de asistencia lingüística. Kishan keene [phone].    We comply with applicable federal civil rights laws and [place] laws. We do not discriminate on the basis of race, color, national origin, age, disability, sex, sexual orientation, or gender identity.            Thank you!     Thank you for choosing [facility]  for your care. Our goal is always to provide you with excellent care. Hearing back from our patients is one way we can continue to improve our services. Please take a few minutes to complete the written survey that you may receive in the mail after your visit with us. Thank you!             Your Updated Medication List - Protect others around you: Learn how to safely use, store and throw away your medicines at www.disposemymeds.org.          This list is accurate as of 6/7/18  9:47 AM.  Always use your most recent med list.                   Brand Name Dispense Instructions for use Diagnosis    Acetyl-L-Carnitine HCl Powd      daily        aspirin 325 MG EC tablet     100    1 tab po QD (Once per day)        atorvastatin 20 MG tablet    LIPITOR    90 tablet    TAKE 1 TABLET BY MOUTH DAILY    Mixed hyperlipidemia       BASAGLAR 100 UNIT/ML injection     15 mL    Inject 42 Units Subcutaneous At Bedtime    Type 2 diabetes mellitus without complication, with long-term current use of insulin (H)       blood glucose monitoring lancets     1 Box    Use to test blood sugar 3 times daily or as directed.    Type 2 diabetes mellitus without complication (H)       blood glucose monitoring meter device kit    no brand specified    1 kit    Ultra Test Meter Use to test blood sugar 4 times daily or as directed.    Type 2 diabetes mellitus without complication, without long-term current use of insulin (H)       GREEN TEA EXTRACT PO      Take by mouth daily        KRILL OIL PO      Take 1 capsule by mouth daily         MAGNESIUM PO      Take by mouth daily        metFORMIN 500 MG 24 hr tablet    GLUCOPHAGE-XR    360 tablet    TAKE 4 TABLETS BY MOUTH DAILY WITH DINNER    Type 2 diabetes mellitus without complication, with long-term current use of insulin (H)       MILK THISTLE PO      Take 1 tablet by mouth daily        NUTRITIONAL SUPPLEMENT PO      Cornitex, Cinsulin, TriSugar Shield, Nurish Green Energy, Amazing Grass Green Superfood        ONETOUCH ULTRA test strip   Generic drug:  blood glucose monitoring     200 strip    TEST BLOOD SUGAR 4 TIMES DAILY    Type 2 diabetes mellitus without complication, without long-term current use of insulin (H)       TAURINE PO      Take 1 tablet by mouth daily        TURMERIC PO      Take 1 tablet by mouth daily        ULTICARE MINI 31G X 6 MM   Generic drug:  insulin pen needle     100 each    USE ONCE DAILY OR AS DIRECTED.    Type 2 diabetes mellitus without complication, without long-term current use of insulin (H)       VITAMIN C PO      Take by mouth daily        VITAMIN D PO      Take by mouth daily

## 2018-06-07 NOTE — NURSING NOTE
Lars is here for a fasting med check    Pre-Visit Screening :  Immunizations : up to date  Colon Screening : is up to date  Asthma Action Test/Plan : na  PHQ9/GAD7 :  Na    Pulse - regular  My Chart - accepts    CLASSIFICATION OF OVERWEIGHT AND OBESITY BY BMI                         Obesity Class           BMI(kg/m2)  Underweight                                    < 18.5  Normal                                         18.5-24.9  Overweight                                     25.0-29.9  OBESITY                     I                  30.0-34.9                              II                 35.0-39.9  EXTREME OBESITY             III                >40                             Patient's  BMI Body mass index is 22.15 kg/(m^2).  http://hin.nhlbi.nih.gov/menuplanner/menu.cgi  Questioned patient about current smoking habits.  Pt. has never smoked.    The patient has verbalized that it is ok to leave a detailed voice message on the patient's cell phone with results/recommendations from this visit.     Verified [phone]  phone number:

## 2018-07-07 DIAGNOSIS — E11.9 TYPE 2 DIABETES MELLITUS WITHOUT COMPLICATION, WITH LONG-TERM CURRENT USE OF INSULIN (H): ICD-10-CM

## 2018-07-07 DIAGNOSIS — Z79.4 TYPE 2 DIABETES MELLITUS WITHOUT COMPLICATION, WITH LONG-TERM CURRENT USE OF INSULIN (H): ICD-10-CM

## 2018-07-07 RX ORDER — INSULIN GLARGINE 100 [IU]/ML
INJECTION, SOLUTION SUBCUTANEOUS
Qty: 15 ML | Refills: 0 | Status: SHIPPED | OUTPATIENT
Start: 2018-07-07 | End: 2018-10-11

## 2018-07-07 NOTE — TELEPHONE ENCOUNTER
Pending Prescriptions:                       Disp   Refills    BASAGLAR 100 UNIT/ML injection [Pharmacy *       0            Sig: INJECT 39 UNITS SUBCUTANEOUSLY NIGHTLY AT BEDTIME    Please reivew for JCC    Pt received a refill for this on 6-7-2017 at  [facility] in 3 months  The med requested says 39 units and the med ordered on 6-7 says 42  Please change qty fax or geovani Caruso  [phone] (home) [phone] (work)

## 2018-08-01 DIAGNOSIS — E11.9 TYPE 2 DIABETES MELLITUS WITHOUT COMPLICATION, WITH LONG-TERM CURRENT USE OF INSULIN (H): ICD-10-CM

## 2018-08-01 DIAGNOSIS — Z79.4 TYPE 2 DIABETES MELLITUS WITHOUT COMPLICATION, WITH LONG-TERM CURRENT USE OF INSULIN (H): ICD-10-CM

## 2018-08-01 RX ORDER — INSULIN GLARGINE 100 [IU]/ML
INJECTION, SOLUTION SUBCUTANEOUS
Qty: 15 ML | Refills: 0 | Status: SHIPPED | OUTPATIENT
Start: 2018-08-01 | End: 2018-09-26

## 2018-08-01 NOTE — TELEPHONE ENCOUNTER
Lars Coleman is needing a refill of the following:    Pending Prescriptions:                       Disp   Refills    BASAGLAR 100 UNIT/ML injection [Pharmacy *       0            Sig: INJECT 42 UNITS SUBCUTANEOUS AT BEDTIME      He is due for a return visit in September and only received a 30 day supply on 7/7/18.     Please send in enough medication to last the patient until September.    Patient Call Back Info:  [phone] (home) [phone] (work)    Thank You,  Minnie Lozada, CMA

## 2018-08-23 DIAGNOSIS — E11.9 TYPE 2 DIABETES MELLITUS WITHOUT COMPLICATION, WITH LONG-TERM CURRENT USE OF INSULIN (H): ICD-10-CM

## 2018-08-23 DIAGNOSIS — Z79.4 TYPE 2 DIABETES MELLITUS WITHOUT COMPLICATION, WITH LONG-TERM CURRENT USE OF INSULIN (H): ICD-10-CM

## 2018-08-23 RX ORDER — METFORMIN HCL 500 MG
TABLET, EXTENDED RELEASE 24 HR ORAL
Qty: 120 TABLET | Refills: 0 | COMMUNITY
Start: 2018-08-23 | End: 2018-08-23

## 2018-08-23 RX ORDER — METFORMIN HCL 500 MG
TABLET, EXTENDED RELEASE 24 HR ORAL
Qty: 120 TABLET | Refills: 0 | COMMUNITY
Start: 2018-08-23 | End: 2019-02-07

## 2018-08-23 RX ORDER — METFORMIN HCL 500 MG
TABLET, EXTENDED RELEASE 24 HR ORAL
Qty: 360 TABLET | Refills: 0 | OUTPATIENT
Start: 2018-08-23

## 2018-08-23 NOTE — TELEPHONE ENCOUNTER
[facility]   metformin 30 days  Per notes on 6-2018 rtc in 3 months  Pt is due for a non fasting ov  Eves    [phone]

## 2018-08-23 NOTE — TELEPHONE ENCOUNTER
Pharmacy called saying they received a denial for the Metformin but the patient is leaving Friday for a month. He made an appointment for 10/2/18. The pharmacy never received the 30 day that Malissa called in so I went ahead and spoke to the pharmacist and okayed the 30 day supply.    Signed Prescriptions:                        Disp   Refills    metFORMIN (GLUCOPHAGE-XR) 500 MG 24 hr tab*120 ta*0        Sig: TAKE 4 TABLETS BY MOUTH DAILY WITH DINNER  Authorizing Provider: DAVID JOSE  Ordering User: ANNA GARVEY    Refused Prescriptions:                       Disp   Refills    metFORMIN (GLUCOPHAGE-XR) 500 MG 24 hr tab*360 ta*0        Sig: TAKE 4 TABLETS BY MOUTH DAILY WITH DINNER  Refused By: MALISSA GRAVES  Reason for Refusal: Request already responded to by other means (phone, fax, etc.)  Reason for Refusal Comment: called in one month 8-  PT DUE FOR OV    Anna Garvey, [facility]

## 2018-09-20 DIAGNOSIS — E11.9 TYPE 2 DIABETES MELLITUS WITHOUT COMPLICATION, WITH LONG-TERM CURRENT USE OF INSULIN (H): ICD-10-CM

## 2018-09-20 DIAGNOSIS — Z79.4 TYPE 2 DIABETES MELLITUS WITHOUT COMPLICATION, WITH LONG-TERM CURRENT USE OF INSULIN (H): ICD-10-CM

## 2018-09-20 RX ORDER — METFORMIN HCL 500 MG
TABLET, EXTENDED RELEASE 24 HR ORAL
Qty: 120 TABLET | Refills: 0 | Status: SHIPPED | OUTPATIENT
Start: 2018-09-20 | End: 2018-10-11

## 2018-09-20 NOTE — TELEPHONE ENCOUNTER
Pending Prescriptions:                       Disp   Refills    metFORMIN (GLUCOPHAGE-XR) 500 MG 24 hr ta*120 ta*0            Sig: TAKE 4 TABLETS BY MOUTH DAILY WITH DINNER    See RE pt has been getting refills   Pt does have an appt on 10-2-2018    Pt should of received 2 months of meds in last RE due to appt time  Please fax one more month and close encounter  Yo[place]ba  [phone] (home) [phone] (work)

## 2018-09-26 DIAGNOSIS — E11.9 TYPE 2 DIABETES MELLITUS WITHOUT COMPLICATION, WITH LONG-TERM CURRENT USE OF INSULIN (H): ICD-10-CM

## 2018-09-26 DIAGNOSIS — Z79.4 TYPE 2 DIABETES MELLITUS WITHOUT COMPLICATION, WITH LONG-TERM CURRENT USE OF INSULIN (H): ICD-10-CM

## 2018-09-26 RX ORDER — INSULIN GLARGINE 100 [IU]/ML
INJECTION, SOLUTION SUBCUTANEOUS
Qty: 15 ML | Refills: 0 | Status: SHIPPED | OUTPATIENT
Start: 2018-09-26 | End: 2019-01-28

## 2018-09-26 NOTE — TELEPHONE ENCOUNTER
Pending Prescriptions:                       Disp   Refills    BASAGLAR 100 UNIT/ML injection [Pharmacy *                    Sig: INJECT 42 UNITS SUBCUTANEOUS AT BEDTIME    Pt does have a ov 10-  Please change qty for a 30 day fax and close encounter  Shady  [phone] (home) [phone] (work)

## 2018-10-11 ENCOUNTER — OFFICE VISIT (OUTPATIENT)
Dept: FAMILY MEDICINE | Facility: CLINIC | Age: 68
End: 2018-10-11

## 2018-10-11 VITALS
WEIGHT: 203.6 LBS | SYSTOLIC BLOOD PRESSURE: 124 MMHG | HEART RATE: 76 BPM | BODY MASS INDEX: 28 KG/M2 | TEMPERATURE: 98.2 F | DIASTOLIC BLOOD PRESSURE: 60 MMHG | OXYGEN SATURATION: 97 %

## 2018-10-11 DIAGNOSIS — Z23 NEED FOR VACCINATION: ICD-10-CM

## 2018-10-11 DIAGNOSIS — E78.2 MIXED HYPERLIPIDEMIA: ICD-10-CM

## 2018-10-11 DIAGNOSIS — Z79.4 TYPE 2 DIABETES MELLITUS WITHOUT COMPLICATION, WITH LONG-TERM CURRENT USE OF INSULIN (H): Primary | ICD-10-CM

## 2018-10-11 DIAGNOSIS — E11.9 TYPE 2 DIABETES MELLITUS WITHOUT COMPLICATION, WITH LONG-TERM CURRENT USE OF INSULIN (H): Primary | ICD-10-CM

## 2018-10-11 LAB — HBA1C MFR BLD: 7.5 % (ref 4–7)

## 2018-10-11 PROCEDURE — 90471 IMMUNIZATION ADMIN: CPT | Performed by: FAMILY MEDICINE

## 2018-10-11 PROCEDURE — 83036 HEMOGLOBIN GLYCOSYLATED A1C: CPT | Performed by: FAMILY MEDICINE

## 2018-10-11 PROCEDURE — 36415 COLL VENOUS BLD VENIPUNCTURE: CPT | Performed by: FAMILY MEDICINE

## 2018-10-11 PROCEDURE — 90686 IIV4 VACC NO PRSV 0.5 ML IM: CPT | Performed by: FAMILY MEDICINE

## 2018-10-11 PROCEDURE — 99213 OFFICE O/P EST LOW 20 MIN: CPT | Mod: 25 | Performed by: FAMILY MEDICINE

## 2018-10-11 RX ORDER — METFORMIN HCL 500 MG
TABLET, EXTENDED RELEASE 24 HR ORAL
Qty: 360 TABLET | Refills: 0 | Status: SHIPPED | OUTPATIENT
Start: 2018-10-11 | End: 2019-01-10

## 2018-10-11 RX ORDER — INSULIN GLARGINE 100 [IU]/ML
INJECTION, SOLUTION SUBCUTANEOUS
Qty: 15 ML | Refills: 1 | Status: SHIPPED | OUTPATIENT
Start: 2018-10-11 | End: 2018-12-21

## 2018-10-11 RX ORDER — ATORVASTATIN CALCIUM 10 MG/1
10 TABLET, FILM COATED ORAL DAILY
Qty: 30 TABLET | Refills: 1 | Status: SHIPPED | OUTPATIENT
Start: 2018-10-11 | End: 2019-02-07 | Stop reason: DRUGHIGH

## 2018-10-11 RX ORDER — ATORVASTATIN CALCIUM 20 MG/1
20 TABLET, FILM COATED ORAL DAILY
Qty: 90 TABLET | Refills: 1 | Status: CANCELLED | OUTPATIENT
Start: 2018-10-11

## 2018-10-11 NOTE — MR AVS SNAPSHOT
After Visit Summary   10/11/2018    Lars Coleman    MRN: [id number]           Patient Information     Date Of Birth          1950        Visit Information        Provider Department      10/11/2018 9:15 AM Pop Pena MD [facility]        Today's Diagnoses     Type 2 diabetes mellitus without complication, with long-term current use of insulin (H)    -  1    Mixed hyperlipidemia        Need for vaccination           Follow-ups after your visit        Follow-up notes from your care team     Return in about 3 months (around 1/11/2019).      Who to contact     If you have questions or need follow up information about today's clinic visit or your schedule please contact [facility] directly at [phone].  Normal or non-critical lab and imaging results will be communicated to you by CallistoTVhart, letter or phone within 4 business days after the clinic has received the results. If you do not hear from us within 7 days, please contact the clinic through CallistoTVhart or phone. If you have a critical or abnormal lab result, we will notify you by phone as soon as possible.  Submit refill requests through eEvent or call your pharmacy and they will forward the refill request to us. Please allow 3 business days for your refill to be completed.          Additional Information About Your Visit        MyChart Information     eEvent gives you secure access to your electronic health record. If you see a primary care provider, you can also send messages to your care team and make appointments. If you have questions, please call your primary care clinic.  If you do not have a primary care provider, please call [phone] and they will assist you.        Care EveryWhere ID     This is your Care EveryWhere ID. This could be used by other organizations to access your [place] medical records  [id number]        Your Vitals Were     Pulse Temperature Pulse  Oximetry BMI (Body Mass Index)          76 98.2  F (36.8  C) (Oral) 97% 28 kg/m2         Blood Pressure from Last 3 Encounters:   10/11/18 124/60   06/07/18 120/68   02/27/18 132/60    Weight from Last 3 Encounters:   10/11/18 92.4 kg (203 lb 9.6 oz)   06/07/18 91.6 kg (202 lb)   02/27/18 90 kg (198 lb 6.4 oz)              We Performed the Following     Hemoglobin A1c (BFP)     VENOUS COLLECTION          Today's Medication Changes          These changes are accurate as of 10/11/18 10:02 AM.  If you have any questions, ask your nurse or doctor.               These medicines have changed or have updated prescriptions.        Dose/Directions    * atorvastatin 20 MG tablet   Commonly known as:  LIPITOR   This may have changed:  Another medication with the same name was added. Make sure you understand how and when to take each.   Used for:  Mixed hyperlipidemia   Changed by:  Pop Pena MD        TAKE 1 TABLET BY MOUTH DAILY   Quantity:  90 tablet   Refills:  1       * atorvastatin 10 MG tablet   Commonly known as:  LIPITOR   This may have changed:  You were already taking a medication with the same name, and this prescription was added. Make sure you understand how and when to take each.   Used for:  Mixed hyperlipidemia   Changed by:  Pop Pena MD        Dose:  10 mg   Take 1 tablet (10 mg) by mouth daily   Quantity:  30 tablet   Refills:  1       * Notice:  This list has 2 medication(s) that are the same as other medications prescribed for you. Read the directions carefully, and ask your doctor or other care provider to review them with you.         Where to get your medicines      These medications were sent to [place] [address] [place], [place] - [address] [facility] [address] [facility]  [address] [facility] [address]     Phone:  [phone]     BASAGLAR 100 UNIT/ML injection    metFORMIN 500 MG 24 hr tablet         Some of these will need a paper prescription and others can be bought over the  counter.  Ask your nurse if you have questions.     Bring a paper prescription for each of these medications     atorvastatin 10 MG tablet                Primary Care Provider Office Phone # Fax #    Pop Pena [phone] [address] [facility] [address]  [facility] [address]        Equal Access to Services     NEREIDA BRICE : Hadii aad ku hadasho Soomaali, waaxda luqadaha, qaybta kaalmada adeegyada, waxay idiin hayaan adeeg khsherley lajulion jaylen. So [facility] [phone].    ATENCIÓN: Si habla español, tiene a queen disposición servicios gratuitos de asistencia lingüística. [facility] [phone].    We comply with applicable federal civil rights laws and [place] laws. We do not discriminate on the basis of race, color, national origin, age, disability, sex, sexual orientation, or gender identity.            Thank you!     Thank you for choosing [facility]  for your care. Our goal is always to provide you with excellent care. Hearing back from our patients is one way we can continue to improve our services. Please take a few minutes to complete the written survey that you may receive in the mail after your visit with us. Thank you!             Your Updated Medication List - Protect others around you: Learn how to safely use, store and throw away your medicines at www.disposemymeds.org.          This list is accurate as of 10/11/18 10:02 AM.  Always use your most recent med list.                   Brand Name Dispense Instructions for use Diagnosis    Acetyl-L-Carnitine HCl Powd      daily        aspirin 325 MG EC tablet     100    1 tab po QD (Once per day)        * atorvastatin 20 MG tablet    LIPITOR    90 tablet    TAKE 1 TABLET BY MOUTH DAILY    Mixed hyperlipidemia       * atorvastatin 10 MG tablet    LIPITOR    30 tablet    Take 1 tablet (10 mg) by mouth daily    Mixed hyperlipidemia       * BASAGLAR 100 UNIT/ML injection     15 mL    INJECT 42 UNITS SUBCUTANEOUS AT  BEDTIME    Type 2 diabetes mellitus without complication, with long-term current use of insulin (H)       * BASAGLAR 100 UNIT/ML injection     15 mL    INJECT 42 UNITS SUBCUTANEOUSLY AT BEDTIME NIGHTLY    Type 2 diabetes mellitus without complication, with long-term current use of insulin (H)       blood glucose monitoring lancets     1 Box    Use to test blood sugar 3 times daily or as directed.    Type 2 diabetes mellitus without complication (H)       blood glucose monitoring meter device kit    no brand specified    1 kit    Ultra Test Meter Use to test blood sugar 4 times daily or as directed.    Type 2 diabetes mellitus without complication, without long-term current use of insulin (H)       GREEN TEA EXTRACT PO      Take by mouth daily        KRILL OIL PO      Take 1 capsule by mouth daily        MAGNESIUM PO      Take by mouth daily        * metFORMIN 500 MG 24 hr tablet    GLUCOPHAGE-XR    120 tablet    TAKE 4 TABLETS BY MOUTH DAILY WITH DINNER    Type 2 diabetes mellitus without complication, with long-term current use of insulin (H)       * metFORMIN 500 MG 24 hr tablet    GLUCOPHAGE-XR    360 tablet    TAKE 4 TABLETS BY MOUTH DAILY WITH DINNER    Type 2 diabetes mellitus without complication, with long-term current use of insulin (H)       MILK THISTLE PO      Take 1 tablet by mouth daily        NUTRITIONAL SUPPLEMENT PO      Cornitex, Cinsulin, TriSugar Shield, Nurish Green Energy, Amazing Grass Green Superfood        ONETOUCH ULTRA test strip   Generic drug:  blood glucose monitoring     200 strip    TEST BLOOD SUGAR 4 TIMES DAILY    Type 2 diabetes mellitus without complication, without long-term current use of insulin (H)       TAURINE PO      Take 1 tablet by mouth daily        TURMERIC PO      Take 1 tablet by mouth daily        ULTICARE MINI 31G X 6 MM   Generic drug:  insulin pen needle     100 each    USE ONCE DAILY OR AS DIRECTED.    Type 2 diabetes mellitus without complication, without  long-term current use of insulin (H)       VITAMIN C PO      Take by mouth daily        VITAMIN D PO      Take by mouth daily        * Notice:  This list has 6 medication(s) that are the same as other medications prescribed for you. Read the directions carefully, and ask your doctor or other care provider to review them with you.

## 2018-10-11 NOTE — PROGRESS NOTES
SUBJECTIVE:                                                    Lars Coleman is a 68 year old male who presents to clinic today for the following health issues:      Diabetes Follow-up-basaglar at 42 unit(s), metformin 2000 mg     Patient is checking blood sugars: once daily.  Results are as follows:         am - 130-140    Diabetic concerns: None     Symptoms of hypoglycemia (low blood sugar): none     Paresthesias (numbness or burning in feet) or sores: No     Date of last diabetic eye exam: overdue    BP Readings from Last 2 Encounters:   10/11/18 124/60   06/07/18 120/68     Hemoglobin A1C (%)   Date Value   06/07/2018 8.1 (A)   02/27/2018 7.9 (A)     LDL Cholesterol Calculated (mg/dL (calc))   Date Value   02/27/2018 76   08/22/2017 91       Diabetes Management Resources  Hyperlipidemia Ktzifp-Iz-spt been on 20 mg lipitor, wants to try 10 as he is achy      Rate your low fat/cholesterol diet?: good    Taking statin?  Yes, possible muscle aches from statin    Other lipid medications/supplements?:  none      Amount of exercise or physical activity: 2-3 days/week for an average of 15-30 minutes    Problems taking medications regularly: No    Medication side effects: none    Diet: regular (no restrictions)            Problem list and histories reviewed & adjusted, as indicated.  Additional history: as documented    Patient Active Problem List   Diagnosis     Mixed hyperlipidemia     Family history of malignant neoplasm of gastrointestinal tract     Calculus of kidney     ACP (advance care planning)     Health Care Home     Type 2 diabetes mellitus without complication, with long-term current use of insulin (H)     Past Surgical History:   Procedure Laterality Date     APPENDECTOMY       HC VASECTOMY UNILAT/BILAT W POSTOP SEMEN      Vasectomy       Social History   Substance Use Topics     Smoking status: Never Smoker     Smokeless tobacco: Never Used     Alcohol use 0.0 oz/week     0 Standard drinks or  equivalent per week      Comment: rare     Family History   Problem Relation Age of Onset     Cancer - colorectal Father      rectal cancer     C.A.D. No family hx of      Diabetes Brother      Prostate Cancer No family hx of      Cerebrovascular Disease Father      in his 40s (smoker)         Current Outpatient Prescriptions   Medication Sig Dispense Refill     atorvastatin (LIPITOR) 20 MG tablet TAKE 1 TABLET BY MOUTH DAILY 90 tablet 1     BASAGLAR 100 UNIT/ML injection INJECT 42 UNITS SUBCUTANEOUS AT BEDTIME 15 mL 0     metFORMIN (GLUCOPHAGE-XR) 500 MG 24 hr tablet TAKE 4 TABLETS BY MOUTH DAILY WITH DINNER 120 tablet 0     Acetylcarnitine HCl (ACETYL-L-CARNITINE HCL) POWD daily       Ascorbic Acid (VITAMIN C PO) Take by mouth daily       ASPIRIN TBEC 325 MG OR 1 tab po QD (Once per day) 100 3     BASAGLAR 100 UNIT/ML injection INJECT 42 UNITS SUBCUTANEOUSLY AT BEDTIME NIGHTLY 15 mL 0     blood glucose monitoring (ACCU-CHEK MULTICLIX) lancets Use to test blood sugar 3 times daily or as directed. 1 Box 11     blood glucose monitoring (NO BRAND SPECIFIED) meter device kit Ultra Test Meter  Use to test blood sugar 4 times daily or as directed. 1 kit 0     Cholecalciferol (VITAMIN D PO) Take by mouth daily       Green Tea, Camillia sinensis, (GREEN TEA EXTRACT PO) Take by mouth daily       KRILL OIL PO Take 1 capsule by mouth daily       MAGNESIUM PO Take by mouth daily       metFORMIN (GLUCOPHAGE-XR) 500 MG 24 hr tablet TAKE 4 TABLETS BY MOUTH DAILY WITH DINNER 120 tablet 0     MILK THISTLE PO Take 1 tablet by mouth daily       Nutritional Supplements (NUTRITIONAL SUPPLEMENT PO) Cornitex, Cinsulin, TriSugar Shield, Nurish Green Energy, Amazing Grass Green Superfood       ONETOUCH ULTRA test strip TEST BLOOD SUGAR 4 TIMES DAILY 200 strip 9     TAURINE PO Take 1 tablet by mouth daily       TURMERIC PO Take 1 tablet by mouth daily       ULTICARE MINI 31G X 6 MM insulin pen needle USE ONCE DAILY OR AS DIRECTED. 100 each  1     Allergies   Allergen Reactions     No Known Drug Allergies      Recent Labs   Lab Test  10/11/18   0956  06/07/18   0944  02/27/18   0931  02/27/18   0922   08/22/17   1124   01/03/17   0934   A1C  7.5*  8.1*   --   7.9*   < >   --    < >   --    LDL   --    --   76   --    --   91   --   71   HDL   --    --   36*   --    --   30*   --   31*   TRIG   --    --   146   --    --   242*   --   189*   ALT   --    --   20   --    --   21   --   25   CR   --    --   0.85   --    --   0.87   --   0.86   GFRESTIMATED   --    --   91   --    --   90   --   91   POTASSIUM   --    --   4.2   --    --   4.5   --   4.1   TSH   --    --   2.13   --    --    --    --    --     < > = values in this interval not displayed.      BP Readings from Last 3 Encounters:   10/11/18 124/60   06/07/18 120/68   02/27/18 132/60    Wt Readings from Last 3 Encounters:   10/11/18 92.4 kg (203 lb 9.6 oz)   06/07/18 91.6 kg (202 lb)   02/27/18 90 kg (198 lb 6.4 oz)                    ROS:  Constitutional, HEENT, cardiovascular, pulmonary, gi and gu systems are negative, except as otherwise noted.    OBJECTIVE:     /60 (BP Location: Right arm, Patient Position: Sitting, Cuff Size: Adult Large)  Pulse 76  Temp 98.2  F (36.8  C) (Oral)  Wt 92.4 kg (203 lb 9.6 oz)  SpO2 97%  BMI 28 kg/m2  Body mass index is 28 kg/(m^2).   GENERAL: healthy, alert and no distress  EYES: Eyes grossly normal to inspection, PERRL and conjunctivae and sclerae normal  HENT: ear canals and TM's normal, nose and mouth without ulcers or lesions  NECK: no adenopathy, no asymmetry, masses, or scars and thyroid normal to palpation  RESP: lungs clear to auscultation - no rales, rhonchi or wheezes  CV: regular rate and rhythm, normal S1 S2, no S3 or S4, no murmur, click or rub, no peripheral edema and peripheral pulses strong  ABDOMEN: soft, nontender, no hepatosplenomegaly, no masses and bowel sounds normal  MS: no gross musculoskeletal defects noted, no  "edema    Diagnostic Test Results:  Results for orders placed or performed in visit on 10/11/18 (from the past 24 hour(s))   Hemoglobin A1c (BFP)   Result Value Ref Range    Hemoglobin A1C 7.5 (A) 4.0 - 7.0 %       ASSESSMENT:       PLAN:   (E11.9,  Z79.4) Type 2 diabetes mellitus without complication, with long-term current use of insulin (H)  (primary encounter diagnosis)  Comment: improved control despite cabin weekends  Plan: Hemoglobin A1c (BFP), VENOUS COLLECTION,         metFORMIN (GLUCOPHAGE-XR) 500 MG 24 hr tablet,         BASAGLAR 100 UNIT/ML injection        continue current medications at current doses     (E78.2) Mixed hyperlipidemia  Comment: control uncertain  Plan: atorvastatin (LIPITOR) 10 MG tablet        Will try 10 mg, he will let me know if aches better, recheck 3 mo    (Z23) Need for vaccination  Comment:   Plan:     BMI:   Estimated body mass index is 28 kg/(m^2) as calculated from the following:    Height as of 6/7/18: 1.816 m (5' 11.5\").    Weight as of this encounter: 92.4 kg (203 lb 9.6 oz).   Weight management plan: Discussed healthy diet and exercise guidelines and patient will follow up in 3 months in clinic to re-evaluate.      FUTURE APPOINTMENTS:       - Follow-up visit in 3 mo  Work on weight loss  Regular exercise    Pop Pena MD  [facility]      "

## 2018-10-11 NOTE — NURSING NOTE
Lars is here for diabetic med check        Pre-visit Screening:  Immunizations:  up to date  Colonoscopy:  is up to date  Mammogram: NA  Asthma Action Test/Plan:  NA  PHQ9:  none  GAD7:  none  Questioned patient about current smoking habits Pt. has never smoked.  Ok to leave detailed message on voice mail for today's visit only Yes, phone # [phone]

## 2018-11-18 DIAGNOSIS — E78.2 MIXED HYPERLIPIDEMIA: ICD-10-CM

## 2018-11-19 RX ORDER — ATORVASTATIN CALCIUM 20 MG/1
TABLET, FILM COATED ORAL
Qty: 90 TABLET | Refills: 1 | Status: SHIPPED | OUTPATIENT
Start: 2018-11-19 | End: 2019-02-07 | Stop reason: DRUGHIGH

## 2018-11-19 NOTE — TELEPHONE ENCOUNTER
Lars Coleman is requesting a refill of:    Pending Prescriptions:                       Disp   Refills    atorvastatin (LIPITOR) 20 MG tablet [Phar*90 tab*1            Sig: TAKE 1 TABLET BY MOUTH DAILY    Recent Labs   Lab Test  02/27/18   0931  08/22/17   1124   CHOL  135  155   HDL  36*  30*   LDL  76  91   TRIG  146  242*   CHOLHDLRATIO  3.8  5.2*

## 2018-12-21 DIAGNOSIS — Z79.4 TYPE 2 DIABETES MELLITUS WITHOUT COMPLICATION, WITH LONG-TERM CURRENT USE OF INSULIN (H): ICD-10-CM

## 2018-12-21 DIAGNOSIS — E11.9 TYPE 2 DIABETES MELLITUS WITHOUT COMPLICATION, WITH LONG-TERM CURRENT USE OF INSULIN (H): ICD-10-CM

## 2018-12-22 NOTE — TELEPHONE ENCOUNTER
Lars Coleman is requesting a refill of:    Pending Prescriptions:                       Disp   Refills    insulin glargine (BASAGLAR KWIKPEN) 100 U*15 mL  1            Sig: INJECT 42 UNITS SUBCUTANEOUSLY AT BEDTIME NIGHTLY    Please close encounter if RX was sent. Thanks, Clara

## 2018-12-24 RX ORDER — INSULIN GLARGINE 100 [IU]/ML
INJECTION, SOLUTION SUBCUTANEOUS
Qty: 15 ML | Refills: 1 | Status: SHIPPED | OUTPATIENT
Start: 2018-12-24 | End: 2019-02-07

## 2019-01-10 ENCOUNTER — TELEPHONE (OUTPATIENT)
Dept: FAMILY MEDICINE | Facility: CLINIC | Age: 69
End: 2019-01-10

## 2019-01-10 DIAGNOSIS — E11.9 TYPE 2 DIABETES MELLITUS WITHOUT COMPLICATION, WITH LONG-TERM CURRENT USE OF INSULIN (H): ICD-10-CM

## 2019-01-10 DIAGNOSIS — Z79.4 TYPE 2 DIABETES MELLITUS WITHOUT COMPLICATION, WITH LONG-TERM CURRENT USE OF INSULIN (H): ICD-10-CM

## 2019-01-10 RX ORDER — METFORMIN HCL 500 MG
TABLET, EXTENDED RELEASE 24 HR ORAL
Qty: 120 TABLET | Refills: 0 | Status: SHIPPED | OUTPATIENT
Start: 2019-01-10 | End: 2019-02-07

## 2019-01-10 NOTE — TELEPHONE ENCOUNTER
Pending Prescriptions:                       Disp   Refills    metFORMIN (GLUCOPHAGE-XR) 500 MG 24 hr ta*120 ta*0            Sig: TAKE 4 TABLETS BY MOUTH DAILY WITH DINNER    Pt is due for a FASTING OV  Last ov was  rtc in 3 months  Please fax 30 and send to [facility]  [phone]

## 2019-01-28 DIAGNOSIS — Z79.4 TYPE 2 DIABETES MELLITUS WITHOUT COMPLICATION, WITH LONG-TERM CURRENT USE OF INSULIN (H): ICD-10-CM

## 2019-01-28 DIAGNOSIS — E11.9 TYPE 2 DIABETES MELLITUS WITHOUT COMPLICATION, WITH LONG-TERM CURRENT USE OF INSULIN (H): ICD-10-CM

## 2019-01-28 RX ORDER — INSULIN GLARGINE 100 [IU]/ML
INJECTION, SOLUTION SUBCUTANEOUS
Qty: 15 ML | Refills: 0 | COMMUNITY
Start: 2019-01-28 | End: 2019-02-07

## 2019-01-28 NOTE — TELEPHONE ENCOUNTER
Patient last seen 10/2018.  Is due for 3 month follow up.  Called 30 day supply to pharmacy.  Pt scheduled to be seen 2/7/19    .

## 2019-02-07 ENCOUNTER — OFFICE VISIT (OUTPATIENT)
Dept: FAMILY MEDICINE | Facility: CLINIC | Age: 69
End: 2019-02-07

## 2019-02-07 VITALS
TEMPERATURE: 98.1 F | DIASTOLIC BLOOD PRESSURE: 60 MMHG | BODY MASS INDEX: 28.09 KG/M2 | SYSTOLIC BLOOD PRESSURE: 114 MMHG | HEIGHT: 72 IN | HEART RATE: 80 BPM | WEIGHT: 207.4 LBS | RESPIRATION RATE: 20 BRPM

## 2019-02-07 DIAGNOSIS — E78.2 MIXED HYPERLIPIDEMIA: ICD-10-CM

## 2019-02-07 DIAGNOSIS — E11.9 TYPE 2 DIABETES MELLITUS WITHOUT COMPLICATION, WITH LONG-TERM CURRENT USE OF INSULIN (H): Primary | ICD-10-CM

## 2019-02-07 DIAGNOSIS — Z79.4 TYPE 2 DIABETES MELLITUS WITHOUT COMPLICATION, WITH LONG-TERM CURRENT USE OF INSULIN (H): Primary | ICD-10-CM

## 2019-02-07 LAB — HBA1C MFR BLD: 7.8 % (ref 4–7)

## 2019-02-07 PROCEDURE — 36415 COLL VENOUS BLD VENIPUNCTURE: CPT | Performed by: FAMILY MEDICINE

## 2019-02-07 PROCEDURE — 80053 COMPREHEN METABOLIC PANEL: CPT | Mod: 90 | Performed by: FAMILY MEDICINE

## 2019-02-07 PROCEDURE — 83036 HEMOGLOBIN GLYCOSYLATED A1C: CPT | Performed by: FAMILY MEDICINE

## 2019-02-07 PROCEDURE — 99214 OFFICE O/P EST MOD 30 MIN: CPT | Performed by: FAMILY MEDICINE

## 2019-02-07 PROCEDURE — 80061 LIPID PANEL: CPT | Mod: 90 | Performed by: FAMILY MEDICINE

## 2019-02-07 RX ORDER — ATORVASTATIN CALCIUM 20 MG/1
10 TABLET, FILM COATED ORAL DAILY
Qty: 45 TABLET | Refills: 1 | Status: SHIPPED | OUTPATIENT
Start: 2019-02-07 | End: 2019-04-30

## 2019-02-07 RX ORDER — METFORMIN HCL 500 MG
2000 TABLET, EXTENDED RELEASE 24 HR ORAL
Qty: 360 TABLET | Refills: 1 | Status: SHIPPED | OUTPATIENT
Start: 2019-02-07 | End: 2019-04-30

## 2019-02-07 ASSESSMENT — MIFFLIN-ST. JEOR: SCORE: 1745.82

## 2019-02-07 NOTE — NURSING NOTE
Lars Coleman is here for a diabetic check and medication refill.    Questioned patient about current smoking habits.  Pt. has never smoked.  Body mass index is 28.52 kg/m .  PULSE regular  My Chart: active  CLASSIFICATION OF OVERWEIGHT AND OBESITY BY BMI                        Obesity Class           BMI(kg/m2)  Underweight                                    < 18.5  Normal                                         18.5-24.9  Overweight                                     25.0-29.9  OBESITY                     I                  30.0-34.9                             II                 35.0-39.9  EXTREME OBESITY             III                >40                            Patient's  BMI Body mass index is 28.52 kg/m .  http://hin.nhlbi.nih.gov/menuplanner/menu.cgi    Pre-visit planning  Immunizations - up to date  Colonoscopy - is up to date  Mammogram -   Asthma -   PHQ9 -    ANYA-7 -

## 2019-02-07 NOTE — PROGRESS NOTES
SUBJECTIVE:                                                    Lars Coleman is a 68 year old male who presents to clinic today for the following health issues:      Diabetes Follow-up-basaglar no longer covered-at 42 unit(s), also metformin 2000 daily    Patient is checking blood sugars: once daily.  Results are as follows:         am - 160    Diabetic concerns: None     Symptoms of hypoglycemia (low blood sugar): none     Paresthesias (numbness or burning in feet) or sores: No     Date of last diabetic eye exam: overdue    Diabetes Management Resources    Hyperlipidemia Follow-Up      Rate your low fat/cholesterol diet?: good    Taking statin?  Yes, no muscle aches from statin    Other lipid medications/supplements?:  none    Hypertension Follow-up      Outpatient blood pressures are not being checked.    Low Salt Diet: no added salt    BP Readings from Last 2 Encounters:   10/11/18 124/60   06/07/18 120/68     Hemoglobin A1C (%)   Date Value   10/11/2018 7.5 (A)   06/07/2018 8.1 (A)     LDL Cholesterol Calculated (mg/dL (calc))   Date Value   02/27/2018 76   08/22/2017 91       Amount of exercise or physical activity: 4-5 days/week for an average of 15-30 minutes    Problems taking medications regularly: No    Medication side effects: none    Diet: regular (no restrictions)            Problem list and histories reviewed & adjusted, as indicated.  Additional history: as documented    Patient Active Problem List   Diagnosis     Mixed hyperlipidemia     Family history of malignant neoplasm of gastrointestinal tract     Calculus of kidney     ACP (advance care planning)     Health Care Home     Type 2 diabetes mellitus without complication, with long-term current use of insulin (H)     Past Surgical History:   Procedure Laterality Date     APPENDECTOMY       HC VASECTOMY UNILAT/BILAT W POSTOP SEMEN      Vasectomy       Social History     Tobacco Use     Smoking status: Never Smoker     Smokeless tobacco: Never  Used   Substance Use Topics     Alcohol use: Yes     Alcohol/week: 0.0 oz     Comment: rare     Family History   Problem Relation Age of Onset     Cancer - colorectal Father         rectal cancer     C.A.D. No family hx of      Diabetes Brother      Prostate Cancer No family hx of      Cerebrovascular Disease Father         in his 40s (smoker)         Current Outpatient Medications   Medication Sig Dispense Refill     Acetylcarnitine HCl (ACETYL-L-CARNITINE HCL) POWD daily       Ascorbic Acid (VITAMIN C PO) Take by mouth daily       ASPIRIN TBEC 325 MG OR 1 tab po QD (Once per day) 100 3     blood glucose monitoring (ACCU-CHEK MULTICLIX) lancets Use to test blood sugar 3 times daily or as directed. 1 Box 11     blood glucose monitoring (NO BRAND SPECIFIED) meter device kit Ultra Test Meter  Use to test blood sugar 4 times daily or as directed. 1 kit 0     Cholecalciferol (VITAMIN D PO) Take by mouth daily       Green Tea, Camillia sinensis, (GREEN TEA EXTRACT PO) Take by mouth daily       KRILL OIL PO Take 1 capsule by mouth daily       MAGNESIUM PO Take by mouth daily       metFORMIN (GLUCOPHAGE-XR) 500 MG 24 hr tablet TAKE 4 TABLETS BY MOUTH DAILY WITH DINNER 120 tablet 0     MILK THISTLE PO Take 1 tablet by mouth daily       Nutritional Supplements (NUTRITIONAL SUPPLEMENT PO) Cornitex, Cinsulin, TriSugar Shield, Nurish Green Energy, Amazing Grass Green Superfood       ONETOUCH ULTRA test strip TEST BLOOD SUGAR 4 TIMES DAILY 200 strip 9     TAURINE PO Take 1 tablet by mouth daily       TURMERIC PO Take 1 tablet by mouth daily       ULTICARE MINI 31G X 6 MM insulin pen needle USE ONCE DAILY OR AS DIRECTED. 100 each 1     Allergies   Allergen Reactions     No Known Drug Allergies      Recent Labs   Lab Test 02/07/19  0940 10/11/18  0956 06/07/18  0944 02/27/18  0931  08/22/17  1124  01/03/17  0934   A1C 7.8* 7.5* 8.1*  --    < >  --    < >  --    LDL  --   --   --  76  --  91  --  71   HDL  --   --   --  36*  --   "30*  --  31*   TRIG  --   --   --  146  --  242*  --  189*   ALT  --   --   --  20  --  21  --  25   CR  --   --   --  0.85  --  0.87  --  0.86   GFRESTIMATED  --   --   --  91  --  90  --  91   POTASSIUM  --   --   --  4.2  --  4.5  --  4.1   TSH  --   --   --  2.13  --   --   --   --     < > = values in this interval not displayed.      BP Readings from Last 3 Encounters:   02/07/19 114/60   10/11/18 124/60   06/07/18 120/68    Wt Readings from Last 3 Encounters:   02/07/19 94.1 kg (207 lb 6.4 oz)   10/11/18 92.4 kg (203 lb 9.6 oz)   06/07/18 91.6 kg (202 lb)                    ROS:  Constitutional, HEENT, cardiovascular, pulmonary, gi and gu systems are negative, except as otherwise noted.    OBJECTIVE:     /60 (BP Location: Left arm, Patient Position: Chair, Cuff Size: Adult Large)   Pulse 80   Temp 98.1  F (36.7  C) (Oral)   Resp 20   Ht 1.816 m (5' 11.5\")   Wt 94.1 kg (207 lb 6.4 oz)   BMI 28.52 kg/m    Body mass index is 28.52 kg/m .   GENERAL: healthy, alert and no distress  EYES: Eyes grossly normal to inspection, PERRL and conjunctivae and sclerae normal  HENT: ear canals and TM's normal, nose and mouth without ulcers or lesions  NECK: no adenopathy, no asymmetry, masses, or scars and thyroid normal to palpation  RESP: lungs clear to auscultation - no rales, rhonchi or wheezes  CV: regular rate and rhythm, normal S1 S2, no S3 or S4, no murmur, click or rub, no peripheral edema and peripheral pulses strong  ABDOMEN: soft, nontender, no hepatosplenomegaly, no masses and bowel sounds normal  MS: no gross musculoskeletal defects noted, no edema  SKIN: no suspicious lesions or rashes  NEURO: Normal strength and tone, mentation intact and speech normal  PSYCH: mentation appears normal, affect normal/bright  Diabetic foot exam: normal DP and PT pulses, no trophic changes or ulcerative lesions and normal sensory exam    Diagnostic Test Results:  Results for orders placed or performed in visit on " "02/07/19 (from the past 24 hour(s))   Hemoglobin A1c (BFP)   Result Value Ref Range    Hemoglobin A1C 7.8 (A) 4.0 - 7.0 %       ASSESSMENT:       PLAN:   (E11.9,  Z79.4) Type 2 diabetes mellitus without complication, with long-term current use of insulin (H)  (primary encounter diagnosis)  Comment: suboptimal control-discussed increasing insulin, working on weight loss  Plan: Hemoglobin A1c (BFP), VENOUS COLLECTION,         metFORMIN (GLUCOPHAGE-XR) 500 MG 24 hr tablet,         COMPREHENSIVE METABOLIC PANEL ([facility]) XCMP,         Lipid Profile (QUEST), Albumin Random Urine         Quantitative with Creat Ratio        Switch to Lantus for cost reasons    (E78.2) Mixed hyperlipidemia  Comment: control uncertain  Plan: atorvastatin (LIPITOR) 20 MG tablet,         COMPREHENSIVE METABOLIC PANEL ([facility]) XCMP,         Lipid Profile (QUEST)        continue current medications at current doses       BMI:   Estimated body mass index is 28.52 kg/m  as calculated from the following:    Height as of this encounter: 1.816 m (5' 11.5\").    Weight as of this encounter: 94.1 kg (207 lb 6.4 oz).   Weight management plan: Discussed healthy diet and exercise guidelines      FUTURE APPOINTMENTS:       - Follow-up visit in 3 mo  Work on weight loss  Regular exercise    Pop Pena MD  [facility]      "

## 2019-02-09 LAB
ALBUMIN SERPL-MCNC: 4.3 G/DL (ref 3.6–5.1)
ALBUMIN/GLOB SERPL: 1.6 (CALC) (ref 1–2.5)
ALP SERPL-CCNC: 72 U/L (ref 40–115)
ALT SERPL-CCNC: 21 U/L (ref 9–46)
AST SERPL-CCNC: 20 U/L (ref 10–35)
BILIRUB SERPL-MCNC: 0.7 MG/DL (ref 0.2–1.2)
BUN SERPL-MCNC: 18 MG/DL (ref 7–25)
BUN/CREATININE RATIO: ABNORMAL (CALC) (ref 6–22)
CALCIUM SERPL-MCNC: 9.5 MG/DL (ref 8.6–10.3)
CHLORIDE SERPLBLD-SCNC: 103 MMOL/L (ref 98–110)
CHOLEST SERPL-MCNC: 164 MG/DL
CHOLEST/HDLC SERPL: 5 (CALC)
CO2 SERPL-SCNC: 26 MMOL/L (ref 20–32)
CREAT SERPL-MCNC: 0.88 MG/DL (ref 0.7–1.25)
EGFR AFRICAN AMERICAN - QUEST: 103 ML/MIN/1.73M2
GFR SERPL CREATININE-BSD FRML MDRD: 89 ML/MIN/1.73M2
GLOBULIN, CALCULATED - QUEST: 2.7 G/DL (CALC) (ref 1.9–3.7)
GLUCOSE - QUEST: 179 MG/DL (ref 65–99)
HDLC SERPL-MCNC: 33 MG/DL
LDLC SERPL CALC-MCNC: 99 MG/DL (CALC)
NONHDLC SERPL-MCNC: 131 MG/DL (CALC)
POTASSIUM SERPL-SCNC: 4.3 MMOL/L (ref 3.5–5.3)
PROT SERPL-MCNC: 7 G/DL (ref 6.1–8.1)
SODIUM SERPL-SCNC: 138 MMOL/L (ref 135–146)
TRIGL SERPL-MCNC: 204 MG/DL

## 2019-03-09 DIAGNOSIS — E11.9 TYPE 2 DIABETES MELLITUS WITHOUT COMPLICATION, WITHOUT LONG-TERM CURRENT USE OF INSULIN (H): ICD-10-CM

## 2019-03-11 NOTE — TELEPHONE ENCOUNTER
Pending Prescriptions:                       Disp   Refills    ONETOUCH ULTRA test strip [Pharmacy Med N*[address]*             Sig: TEST BLOOD SUGAR 4 TIMES DAILY    Please fax and close encounter  Malissa  [phone] (home) [phone] (work)

## 2019-03-12 DIAGNOSIS — E11.9 TYPE 2 DIABETES MELLITUS WITHOUT COMPLICATION, WITH LONG-TERM CURRENT USE OF INSULIN (H): ICD-10-CM

## 2019-03-12 DIAGNOSIS — Z79.4 TYPE 2 DIABETES MELLITUS WITHOUT COMPLICATION, WITH LONG-TERM CURRENT USE OF INSULIN (H): ICD-10-CM

## 2019-03-12 RX ORDER — INSULIN GLARGINE 100 [IU]/ML
45 INJECTION, SOLUTION SUBCUTANEOUS DAILY
Qty: 21 ML | Refills: 1 | Status: SHIPPED | OUTPATIENT
Start: 2019-03-12 | End: 2019-04-30

## 2019-03-12 NOTE — TELEPHONE ENCOUNTER
Pt insurance now covers Basaglar. Was on 45 U of Lantus at bedtime    Please advise  Lars Coleman is requesting a refill of:    Pending Prescriptions:                       Disp   Refills    insulin glargine (BASAGLAR KWIKPEN) 100 U*15 mL  0            Sig: Inject Subcutaneous daily

## 2019-04-02 ENCOUNTER — TELEPHONE (OUTPATIENT)
Dept: FAMILY MEDICINE | Facility: CLINIC | Age: 69
End: 2019-04-02

## 2019-04-02 NOTE — TELEPHONE ENCOUNTER
Pt called in stating he recently started the Keto diet. Pt has been eating a lot of salmon, steak, omelets, avacadoes, and nuts. Pt stated he has been checking his blood sugars and they are never over 160, most mornings closer to 109. He stated he has reduced the amount of insulin he uses and feels well managed. Pt is wondering if he can still return to recheck his labs in May or if you think he should be seen sooner with all these changes that he has made?    Please advise # [phone]    Thanks, Tatiana

## 2019-04-03 DIAGNOSIS — E11.9 TYPE 2 DIABETES MELLITUS WITHOUT COMPLICATION, WITH LONG-TERM CURRENT USE OF INSULIN (H): ICD-10-CM

## 2019-04-03 DIAGNOSIS — Z79.4 TYPE 2 DIABETES MELLITUS WITHOUT COMPLICATION, WITH LONG-TERM CURRENT USE OF INSULIN (H): ICD-10-CM

## 2019-04-03 RX ORDER — INSULIN GLARGINE 100 [IU]/ML
INJECTION, SOLUTION SUBCUTANEOUS
Refills: 0 | OUTPATIENT
Start: 2019-04-03

## 2019-04-03 NOTE — TELEPHONE ENCOUNTER
Spoke with pt. He understood and will come in for an appt in early May as long as he feels like things are going well.

## 2019-04-03 NOTE — TELEPHONE ENCOUNTER
As long as sugars monitored regularly to make sure no lows I am fine with him waiting-good for him:)

## 2019-04-03 NOTE — TELEPHONE ENCOUNTER
Received incoming refill request for   Pending Prescriptions:                       Disp   Refills    LANTUS SOLOSTAR 100 UNIT/ML soln [Pharmac*       0            Sig: INJECT 45 UNITS SUBCUTANEOUS AT BEDTIME    This is no longer on patients medication list and basaglar kwik pen was just sent in on 3/12/19. Routing to Dr. Pena to verify that patient is not using the lantus solostar pen anymore.

## 2019-04-30 ENCOUNTER — OFFICE VISIT (OUTPATIENT)
Dept: FAMILY MEDICINE | Facility: CLINIC | Age: 69
End: 2019-04-30

## 2019-04-30 VITALS
WEIGHT: 196.6 LBS | HEART RATE: 87 BPM | OXYGEN SATURATION: 97 % | BODY MASS INDEX: 27.04 KG/M2 | DIASTOLIC BLOOD PRESSURE: 60 MMHG | SYSTOLIC BLOOD PRESSURE: 112 MMHG | TEMPERATURE: 99.6 F

## 2019-04-30 DIAGNOSIS — Z79.4 TYPE 2 DIABETES MELLITUS WITHOUT COMPLICATION, WITH LONG-TERM CURRENT USE OF INSULIN (H): ICD-10-CM

## 2019-04-30 DIAGNOSIS — E11.9 TYPE 2 DIABETES MELLITUS WITHOUT COMPLICATION, WITH LONG-TERM CURRENT USE OF INSULIN (H): ICD-10-CM

## 2019-04-30 DIAGNOSIS — E78.2 MIXED HYPERLIPIDEMIA: ICD-10-CM

## 2019-04-30 LAB
CHOLEST SERPL-MCNC: 137 MG/DL (ref 0–199)
CHOLEST/HDLC SERPL: 4 {RATIO} (ref 0–5)
HBA1C MFR BLD: 6.8 % (ref 4–7)
HDLC SERPL-MCNC: 36 MG/DL (ref 40–150)
LDLC SERPL CALC-MCNC: 76 MG/DL (ref 0–99)
TRIGL SERPL-MCNC: 124 MG/DL (ref 0–149)

## 2019-04-30 PROCEDURE — 99213 OFFICE O/P EST LOW 20 MIN: CPT | Performed by: FAMILY MEDICINE

## 2019-04-30 PROCEDURE — 83036 HEMOGLOBIN GLYCOSYLATED A1C: CPT | Performed by: FAMILY MEDICINE

## 2019-04-30 PROCEDURE — 36415 COLL VENOUS BLD VENIPUNCTURE: CPT | Performed by: FAMILY MEDICINE

## 2019-04-30 PROCEDURE — 80061 LIPID PANEL: CPT | Performed by: FAMILY MEDICINE

## 2019-04-30 RX ORDER — ATORVASTATIN CALCIUM 20 MG/1
10 TABLET, FILM COATED ORAL DAILY
Qty: 45 TABLET | Refills: 3 | Status: SHIPPED | OUTPATIENT
Start: 2019-04-30 | End: 2019-11-07

## 2019-04-30 RX ORDER — METFORMIN HCL 500 MG
2000 TABLET, EXTENDED RELEASE 24 HR ORAL
Qty: 360 TABLET | Refills: 1 | Status: SHIPPED | OUTPATIENT
Start: 2019-04-30 | End: 2019-11-07

## 2019-04-30 NOTE — NURSING NOTE
Lars is here for med check    Pre-visit Screening:  Immunizations:  up to date  Colonoscopy:  is up to date  Mammogram: NA  Asthma Action Test/Plan:  NA  PHQ9:  NA  GAD7:  NA  Questioned patient about current smoking habits Pt. has never smoked.  Ok to leave detailed message on voice mail for today's visit only Yes, phone # [phone]

## 2019-04-30 NOTE — PROGRESS NOTES
SUBJECTIVE:   Lars Coleman is a 68 year old male who presents to clinic today for the following   health issues:      Diabetes Follow-up-now on lantus-varying unit(s) as he is on keto diet since 3/6, metformin    Patient is checking blood sugars: once daily.  Results are as follows:         am - 104-149-    Diabetic concerns: None     Symptoms of hypoglycemia (low blood sugar): none     Paresthesias (numbness or burning in feet) or sores: No     Date of last diabetic eye exam: overdue    BP Readings from Last 2 Encounters:   04/30/19 112/60   02/07/19 114/60     Hemoglobin A1C (%)   Date Value   02/07/2019 7.8 (A)   10/11/2018 7.5 (A)     LDL Cholesterol Calculated (mg/dL (calc))   Date Value   02/07/2019 99   02/27/2018 76       Diabetes Management Resources  Hyperlipidemia Follow-Up      Rate your low fat/cholesterol diet?: good    Taking statin?  Yes, no muscle aches from statin    Other lipid medications/supplements?:  none      Amount of exercise or physical activity: 2-3 days/week for an average of 30-45 minutes    Problems taking medications regularly: No    Medication side effects: none    Diet: keto diet             Additional history: as documented    Reviewed  and updated as needed this visit by clinical staff  Tobacco  Allergies  Problems         Reviewed and updated as needed this visit by Provider         Patient Active Problem List   Diagnosis     Mixed hyperlipidemia     Family history of malignant neoplasm of gastrointestinal tract     Calculus of kidney     ACP (advance care planning)     Health Care Home     Type 2 diabetes mellitus without complication, with long-term current use of insulin (H)     Past Surgical History:   Procedure Laterality Date     APPENDECTOMY       HC VASECTOMY UNILAT/BILAT W POSTOP SEMEN      Vasectomy       Social History     Tobacco Use     Smoking status: Never Smoker     Smokeless tobacco: Never Used   Substance Use Topics     Alcohol use: Yes     Alcohol/week:  0.0 oz     Comment: rare     Family History   Problem Relation Age of Onset     Cancer - colorectal Father         rectal cancer     C.A.D. No family hx of      Diabetes Brother      Prostate Cancer No family hx of      Cerebrovascular Disease Father         in his 40s (smoker)         Current Outpatient Medications   Medication Sig Dispense Refill     Acetylcarnitine HCl (ACETYL-L-CARNITINE HCL) POWD daily       Ascorbic Acid (VITAMIN C PO) Take by mouth daily       atorvastatin (LIPITOR) 20 MG tablet Take 0.5 tablets (10 mg) by mouth daily 45 tablet 3     blood glucose monitoring (ACCU-CHEK MULTICLIX) lancets Use to test blood sugar 3 times daily or as directed. 1 Box 11     blood glucose monitoring (NO BRAND SPECIFIED) meter device kit Ultra Test Meter  Use to test blood sugar 4 times daily or as directed. 1 kit 0     Cholecalciferol (VITAMIN D PO) Take by mouth daily       Green Tea, Camillia sinensis, (GREEN TEA EXTRACT PO) Take by mouth daily       insulin glargine (LANTUS SOLOSTAR PEN) 100 UNIT/ML pen Inject 40 Units Subcutaneous At Bedtime 21 mL 3     KRILL OIL PO Take 1 capsule by mouth daily       MAGNESIUM PO Take by mouth daily       metFORMIN (GLUCOPHAGE-XR) 500 MG 24 hr tablet Take 4 tablets (2,000 mg) by mouth daily (with dinner) 360 tablet 1     MILK THISTLE PO Take 1 tablet by mouth daily       Nutritional Supplements (NUTRITIONAL SUPPLEMENT PO) Cornitex, Cinsulin, TriSugar Shield, Nurish Green Energy, Amazing Grass Green Superfood       ONETOUCH ULTRA test strip TEST BLOOD SUGAR 4 TIMES DAILY 200 strip 1     TAURINE PO Take 1 tablet by mouth daily       TURMERIC PO Take 1 tablet by mouth daily       ULTICARE MINI 31G X 6 MM insulin pen needle USE ONCE DAILY OR AS DIRECTED. 100 each 1     Allergies   Allergen Reactions     No Known Drug Allergies      Recent Labs   Lab Test 04/30/19  1007 02/07/19  0952 02/07/19  0940 10/11/18  0956  02/27/18  0931  08/22/17  1124   A1C 6.8  --  7.8* 7.5*   < >  --     < >  --    LDL  --  99  --   --   --  76  --  91   HDL  --  33*  --   --   --  36*  --  30*   TRIG  --  204*  --   --   --  146  --  242*   ALT  --  21  --   --   --  20  --  21   CR  --  0.88  --   --   --  0.85  --  0.87   GFRESTIMATED  --  89  --   --   --  91  --  90   POTASSIUM  --  4.3  --   --   --  4.2  --  4.5   TSH  --   --   --   --   --  2.13  --   --     < > = values in this interval not displayed.      BP Readings from Last 3 Encounters:   04/30/19 112/60   02/07/19 114/60   10/11/18 124/60    Wt Readings from Last 3 Encounters:   04/30/19 89.2 kg (196 lb 9.6 oz)   02/07/19 94.1 kg (207 lb 6.4 oz)   10/11/18 92.4 kg (203 lb 9.6 oz)                    ROS:  Constitutional, HEENT, cardiovascular, pulmonary, gi and gu systems are negative, except as otherwise noted.    OBJECTIVE:     /60 (BP Location: Left arm, Patient Position: Sitting, Cuff Size: Adult Large)   Pulse 87   Temp 99.6  F (37.6  C) (Oral)   Wt 89.2 kg (196 lb 9.6 oz)   SpO2 97%   BMI 27.04 kg/m    Body mass index is 27.04 kg/m .   GENERAL: healthy, alert and no distress  EYES: Eyes grossly normal to inspection, PERRL and conjunctivae and sclerae normal  HENT: ear canals and TM's normal, nose and mouth without ulcers or lesions  NECK: no adenopathy, no asymmetry, masses, or scars and thyroid normal to palpation  RESP: lungs clear to auscultation - no rales, rhonchi or wheezes  CV: regular rate and rhythm, normal S1 S2, no S3 or S4, no murmur, click or rub, no peripheral edema and peripheral pulses strong  ABDOMEN: soft, nontender, no hepatosplenomegaly, no masses and bowel sounds normal  MS: no gross musculoskeletal defects noted, no edema    Diagnostic Test Results:  Results for orders placed or performed in visit on 04/30/19 (from the past 24 hour(s))   Hemoglobin A1c   Result Value Ref Range    Hemoglobin A1C 6.8 4.0 - 7.0 %       ASSESSMENT:       PLAN:   (E78.2) Mixed hyperlipidemia  Comment: controlled but on keto  "diet  Plan: atorvastatin (LIPITOR) 20 MG tablet            (E11.9,  Z79.4) Type 2 diabetes mellitus without complication, with long-term current use of insulin (H)  Comment: improved control but pt self adjusting lantus all over the place due to concerns he will be low given keto diet-we discussed need to contact provider when making changes so we can handle this in best and safest way-applauded healthy diet changes  Plan: insulin glargine (LANTUS SOLOSTAR PEN) 100         UNIT/ML pen, metFORMIN (GLUCOPHAGE-XR) 500 MG         24 hr tablet, Hemoglobin A1c, VENOUS COLLECTION        Will decrease lantus to 20 untis daily and have pt check am sugars onoy every day, Pt to use rule of 3's-will decrease Lantus by 3 units after every 3 day period where am sugars below 90. Goal is for all am sugars to fall between -pt to let me know how this goes in 2 weeks        BMI:   Estimated body mass index is 27.04 kg/m  as calculated from the following:    Height as of 2/7/19: 1.816 m (5' 11.5\").    Weight as of this encounter: 89.2 kg (196 lb 9.6 oz).   Weight management plan: Discussed healthy diet and exercise guidelines      FUTURE APPOINTMENTS:       - Follow-up visit in 3 mo  Work on weight loss  Regular exercise    Pop Pena MD  [facility]      "

## 2019-05-06 DIAGNOSIS — E11.9 TYPE 2 DIABETES MELLITUS WITHOUT COMPLICATION, WITHOUT LONG-TERM CURRENT USE OF INSULIN (H): ICD-10-CM

## 2019-05-07 RX ORDER — FLURBIPROFEN SODIUM 0.3 MG/ML
SOLUTION/ DROPS OPHTHALMIC
Qty: 100 EACH | Refills: 1 | Status: SHIPPED | OUTPATIENT
Start: 2019-05-07 | End: 2020-07-10

## 2019-05-07 NOTE — TELEPHONE ENCOUNTER
Last seen 04/30/19.    Lars Coleman is requesting a refill of:    Pending Prescriptions:                       Disp   Refills    ULTICARE MINI 31G X 6 MM insulin pen need*       1            Sig: USE ONCE DAILY OR AS DIRECTED.    ONETOUCH ULTRA test strip [Pharmacy Med N*[address]*0            Sig: TEST BLOOD SUGAR 4 TIMES DAILY

## 2019-05-08 DIAGNOSIS — E11.9 TYPE 2 DIABETES MELLITUS WITHOUT COMPLICATION (H): ICD-10-CM

## 2019-05-08 DIAGNOSIS — Z79.4 TYPE 2 DIABETES MELLITUS WITHOUT COMPLICATION, WITH LONG-TERM CURRENT USE OF INSULIN (H): Primary | ICD-10-CM

## 2019-05-08 DIAGNOSIS — E11.9 TYPE 2 DIABETES MELLITUS WITHOUT COMPLICATION, WITH LONG-TERM CURRENT USE OF INSULIN (H): Primary | ICD-10-CM

## 2019-05-08 NOTE — TELEPHONE ENCOUNTER
Lars Coleman is requesting a refill of:    Pending Prescriptions:                       Disp   Refills    blood glucose monitoring (ACCU-CHEK MULTI*100 ea*2            Sig: Use to test blood sugar 3 times daily or as           directed.

## 2019-06-23 DIAGNOSIS — E11.9 TYPE 2 DIABETES MELLITUS WITHOUT COMPLICATION, WITHOUT LONG-TERM CURRENT USE OF INSULIN (H): ICD-10-CM

## 2019-06-24 NOTE — TELEPHONE ENCOUNTER
Pending Prescriptions:                       Disp   Refills    ONETOUCH ULTRA test strip [Pharmacy Med N*[address]*             Sig: TEST BLOOD SUGAR 4 TIMES DAILY    Fax and close encounter  Malissa  [phone] (home) [phone] (work)

## 2019-08-16 ENCOUNTER — TELEPHONE (OUTPATIENT)
Dept: FAMILY MEDICINE | Facility: CLINIC | Age: 69
End: 2019-08-16

## 2019-08-16 DIAGNOSIS — Z79.4 TYPE 2 DIABETES MELLITUS WITHOUT COMPLICATION, WITH LONG-TERM CURRENT USE OF INSULIN (H): ICD-10-CM

## 2019-08-16 DIAGNOSIS — E11.9 TYPE 2 DIABETES MELLITUS WITHOUT COMPLICATION, WITH LONG-TERM CURRENT USE OF INSULIN (H): ICD-10-CM

## 2019-10-01 ENCOUNTER — HEALTH MAINTENANCE LETTER (OUTPATIENT)
Age: 69
End: 2019-10-01

## 2019-10-01 ENCOUNTER — TRANSFERRED RECORDS (OUTPATIENT)
Dept: MULTI SPECIALTY CLINIC | Facility: CLINIC | Age: 69
End: 2019-10-01

## 2019-10-01 LAB — RETINOPATHY: NORMAL

## 2019-11-07 ENCOUNTER — OFFICE VISIT (OUTPATIENT)
Dept: FAMILY MEDICINE | Facility: CLINIC | Age: 69
End: 2019-11-07

## 2019-11-07 VITALS
WEIGHT: 203.6 LBS | TEMPERATURE: 98 F | DIASTOLIC BLOOD PRESSURE: 70 MMHG | RESPIRATION RATE: 20 BRPM | SYSTOLIC BLOOD PRESSURE: 124 MMHG | HEART RATE: 88 BPM | BODY MASS INDEX: 27.58 KG/M2 | HEIGHT: 72 IN

## 2019-11-07 DIAGNOSIS — Z12.11 SCREENING FOR COLON CANCER: ICD-10-CM

## 2019-11-07 DIAGNOSIS — E78.2 MIXED HYPERLIPIDEMIA: ICD-10-CM

## 2019-11-07 DIAGNOSIS — E11.9 TYPE 2 DIABETES MELLITUS WITHOUT COMPLICATION, WITHOUT LONG-TERM CURRENT USE OF INSULIN (H): ICD-10-CM

## 2019-11-07 DIAGNOSIS — Z12.5 SPECIAL SCREENING FOR MALIGNANT NEOPLASM OF PROSTATE: Primary | ICD-10-CM

## 2019-11-07 DIAGNOSIS — Z79.4 TYPE 2 DIABETES MELLITUS WITHOUT COMPLICATION, WITH LONG-TERM CURRENT USE OF INSULIN (H): ICD-10-CM

## 2019-11-07 DIAGNOSIS — E11.9 TYPE 2 DIABETES MELLITUS WITHOUT COMPLICATION, WITH LONG-TERM CURRENT USE OF INSULIN (H): ICD-10-CM

## 2019-11-07 LAB
ALBUMIN SERPL-MCNC: 4.9 G/DL (ref 3.6–5.1)
ALBUMIN URINE MG/G CR: ABNORMAL MG/G CREATININE
ALBUMIN URINE MG/SPEC: 10
ALBUMIN/GLOB SERPL: 1.6 {RATIO} (ref 1–2.5)
ALP SERPL-CCNC: 87 U/L (ref 33–130)
ALT 1742-6: 12 U/L (ref 5–30)
AST 1920-8: 15 U/L (ref 7–31)
BILIRUB SERPL-MCNC: 0.9 MG/DL (ref 0.2–1.2)
BUN SERPL-MCNC: 18 MG/DL (ref 7–25)
BUN/CREATININE RATIO: 17.8 (ref 6–22)
CALCIUM SERPL-MCNC: 10.3 MG/DL (ref 8.6–10.3)
CHLORIDE SERPLBLD-SCNC: 100.5 MMOL/L (ref 98–110)
CHOLEST SERPL-MCNC: 205 MG/DL (ref 0–199)
CHOLEST/HDLC SERPL: 4 {RATIO} (ref 0–5)
CO2 SERPL-SCNC: 27.7 MMOL/L (ref 20–32)
CREAT SERPL-MCNC: 1.01 MG/DL (ref 0.7–1.18)
CREATININE URINE: 50
GLOBULIN, CALCULATED - QUEST: 3 (ref 1.9–3.7)
GLUCOSE SERPL-MCNC: 187 MG/DL (ref 60–99)
HBA1C MFR BLD: 7 % (ref 4–7)
HDLC SERPL-MCNC: 49 MG/DL (ref 40–150)
LDLC SERPL CALC-MCNC: 109 MG/DL (ref 0–130)
POTASSIUM SERPL-SCNC: 5.16 MMOL/L (ref 3.5–5.3)
PROT SERPL-MCNC: 7.9 G/DL (ref 6.1–8.1)
SODIUM SERPL-SCNC: 139.1 MMOL/L (ref 135–146)
TRIGL SERPL-MCNC: 237 MG/DL (ref 0–149)

## 2019-11-07 PROCEDURE — 80061 LIPID PANEL: CPT | Performed by: FAMILY MEDICINE

## 2019-11-07 PROCEDURE — G0008 ADMIN INFLUENZA VIRUS VAC: HCPCS | Performed by: FAMILY MEDICINE

## 2019-11-07 PROCEDURE — 36415 COLL VENOUS BLD VENIPUNCTURE: CPT | Performed by: FAMILY MEDICINE

## 2019-11-07 PROCEDURE — 84153 ASSAY OF PSA TOTAL: CPT | Mod: 90 | Performed by: FAMILY MEDICINE

## 2019-11-07 PROCEDURE — 80053 COMPREHEN METABOLIC PANEL: CPT | Performed by: FAMILY MEDICINE

## 2019-11-07 PROCEDURE — 90686 IIV4 VACC NO PRSV 0.5 ML IM: CPT | Performed by: FAMILY MEDICINE

## 2019-11-07 PROCEDURE — 99214 OFFICE O/P EST MOD 30 MIN: CPT | Mod: 25 | Performed by: FAMILY MEDICINE

## 2019-11-07 PROCEDURE — 83036 HEMOGLOBIN GLYCOSYLATED A1C: CPT | Performed by: FAMILY MEDICINE

## 2019-11-07 PROCEDURE — 82043 UR ALBUMIN QUANTITATIVE: CPT | Performed by: FAMILY MEDICINE

## 2019-11-07 RX ORDER — METFORMIN HCL 500 MG
2000 TABLET, EXTENDED RELEASE 24 HR ORAL
Qty: 360 TABLET | Refills: 1 | Status: SHIPPED | OUTPATIENT
Start: 2019-11-07 | End: 2020-07-07

## 2019-11-07 RX ORDER — ATORVASTATIN CALCIUM 20 MG/1
10 TABLET, FILM COATED ORAL DAILY
Qty: 45 TABLET | Refills: 1 | Status: SHIPPED | OUTPATIENT
Start: 2019-11-07 | End: 2020-07-07

## 2019-11-07 ASSESSMENT — MIFFLIN-ST. JEOR: SCORE: 1723.58

## 2019-11-07 NOTE — PROGRESS NOTES
Subjective     Lars Coleman is a 69 year old male who presents to clinic today for the following health issues:    HPI   Diabetes Dccqho-cu-qkb on keto diet during summer- sugars went down- adjusted lantus along the way-now using 25 units    How often are you checking your blood sugar? One time daily  What time of day are you checking your blood sugars (select all that apply)?  Before meals  Have you had any blood sugars above 200?  No  Have you had any blood sugars below 70?  No    What symptoms do you notice when your blood sugar is low?  None    What concerns do you have today about your diabetes? None     Do you have any of these symptoms? (Select all that apply)  No numbness or tingling in feet.  No redness, sores or blisters on feet.  No complaints of excessive thirst.  No reports of blurry vision.  No significant changes to weight.     Have you had a diabetic eye exam in the last 12 months? Yes- Date of last eye exam: m-10    BP Readings from Last 2 Encounters:   11/07/19 124/70   04/30/19 112/60     Hemoglobin A1C (%)   Date Value   04/30/2019 6.8   02/07/2019 7.8 (A)     LDL Cholesterol Calculated (mg/dL (calc))   Date Value   02/07/2019 99   02/27/2018 76     LDL Cholesterol Direct (mg/dL)   Date Value   04/30/2019 76       Diabetes Management Resources    Hyperlipidemia Follow-Up      Are you having any of the following symptoms? (Select all that apply)  No complaints of shortness of breath, chest pain or pressure.  No increased sweating or nausea with activity.  No left-sided neck or arm pain.  No complaints of pain in calves when walking 1-2 blocks.    Are you regularly taking any medication or supplement to lower your cholesterol?   Yes- statin    Are you having muscle aches or other side effects that you think could be caused by your cholesterol lowering medication?  No      How many servings of fruits and vegetables do you eat daily?  2-3    On average, how many sweetened beverages do you drink  each day (soda, juice, sweet tea, etc)?   0    How many days per week do you miss taking your medication? 0        Patient Active Problem List   Diagnosis     Mixed hyperlipidemia     Family history of malignant neoplasm of gastrointestinal tract     Calculus of kidney     ACP (advance care planning)     Health Care Home     Type 2 diabetes mellitus without complication, with long-term current use of insulin (H)     Past Surgical History:   Procedure Laterality Date     APPENDECTOMY       HC VASECTOMY UNILAT/BILAT W POSTOP SEMEN      Vasectomy       Social History     Tobacco Use     Smoking status: Never Smoker     Smokeless tobacco: Never Used   Substance Use Topics     Alcohol use: Yes     Alcohol/week: 0.0 standard drinks     Comment: rare     Family History   Problem Relation Age of Onset     Cancer - colorectal Father         rectal cancer     C.A.D. No family hx of      Diabetes Brother      Prostate Cancer No family hx of      Cerebrovascular Disease Father         in his 40s (smoker)         Current Outpatient Medications   Medication Sig Dispense Refill     Acetylcarnitine HCl (ACETYL-L-CARNITINE HCL) POWD daily       Ascorbic Acid (VITAMIN C PO) Take by mouth daily       atorvastatin (LIPITOR) 20 MG tablet Take 0.5 tablets (10 mg) by mouth daily 45 tablet 3     blood glucose monitoring (NO BRAND SPECIFIED) meter device kit Ultra Test Meter  Use to test blood sugar 4 times daily or as directed. 1 kit 0     Cholecalciferol (VITAMIN D PO) Take by mouth daily       Green Tea, Camillia sinensis, (GREEN TEA EXTRACT PO) Take by mouth daily       insulin glargine (LANTUS SOLOSTAR PEN) 100 UNIT/ML pen Inject 40 Units Subcutaneous At Bedtime 21 mL 3     KRILL OIL PO Take 1 capsule by mouth daily       MAGNESIUM PO Take by mouth daily       metFORMIN (GLUCOPHAGE-XR) 500 MG 24 hr tablet Take 4 tablets (2,000 mg) by mouth daily (with dinner) 360 tablet 1     MILK THISTLE PO Take 1 tablet by mouth daily        "Nutritional Supplements (NUTRITIONAL SUPPLEMENT PO) Cornitex, Cinsulin, TriSugar Shield, Nurish Green Energy, Amazing Grass Green Superfood       ONETOUCH ULTRA test strip TEST BLOOD SUGAR 4 TIMES DAILY 200 strip 1     TAURINE PO Take 1 tablet by mouth daily       TURMERIC PO Take 1 tablet by mouth daily       ULTICARE MINI 31G X 6 MM insulin pen needle USE ONCE DAILY OR AS DIRECTED. 100 each 1     blood glucose monitoring (ACCU-CHEK MULTICLIX) lancets Use to test blood sugar 3 times daily or as directed. 100 each 2     Allergies   Allergen Reactions     No Known Drug Allergies      Recent Labs   Lab Test 04/30/19  1210 04/30/19  1007 02/07/19  0952 02/07/19  0940 10/11/18  0956  02/27/18  0931  08/22/17  1124   A1C  --  6.8  --  7.8* 7.5*   < >  --    < >  --    LDL 76  --  99  --   --   --  76  --  91   HDL 36*  --  33*  --   --   --  36*  --  30*   TRIG 124  --  204*  --   --   --  146  --  242*   ALT  --   --  21  --   --   --  20  --  21   CR  --   --  0.88  --   --   --  0.85  --  0.87   GFRESTIMATED  --   --  89  --   --   --  91  --  90   POTASSIUM  --   --  4.3  --   --   --  4.2  --  4.5   TSH  --   --   --   --   --   --  2.13  --   --     < > = values in this interval not displayed.      BP Readings from Last 3 Encounters:   11/07/19 124/70   04/30/19 112/60   02/07/19 114/60    Wt Readings from Last 3 Encounters:   11/07/19 92.4 kg (203 lb 9.6 oz)   04/30/19 89.2 kg (196 lb 9.6 oz)   02/07/19 94.1 kg (207 lb 6.4 oz)                      Reviewed and updated as needed this visit by Provider         Review of Systems   ROS COMP: Constitutional, HEENT, cardiovascular, pulmonary, gi and gu systems are negative, except as otherwise noted.      Objective    /70 (BP Location: Right arm, Patient Position: Chair, Cuff Size: Adult Large)   Pulse 88   Resp 20   Ht 1.816 m (5' 11.5\")   Wt 92.4 kg (203 lb 9.6 oz)   BMI 28.00 kg/m    Body mass index is 28 kg/m .  Physical Exam   GENERAL: healthy, alert and " "no distress  EYES: Eyes grossly normal to inspection, PERRL and conjunctivae and sclerae normal  HENT: ear canals and TM's normal, nose and mouth without ulcers or lesions  NECK: no adenopathy, no asymmetry, masses, or scars and thyroid normal to palpation  RESP: lungs clear to auscultation - no rales, rhonchi or wheezes  CV: regular rate and rhythm, normal S1 S2, no S3 or S4, no murmur, click or rub, no peripheral edema and peripheral pulses strong  ABDOMEN: soft, nontender, no hepatosplenomegaly, no masses and bowel sounds normal  MS: no gross musculoskeletal defects noted, no edema  Diabetic foot exam: normal DP and PT pulses, no trophic changes or ulcerative lesions and normal sensory exam    Diagnostic Test Results:  Labs reviewed in Epic        Assessment & Plan   Assessment      Plan  (E11.9,  Z79.4) Type 2 diabetes mellitus without complication, with long-term current use of insulin (H)  Comment: reasonable control-pt panning to go back on keto and hopes to wean off insulin completely-was able to get down to 15 units with this diet early summer  Plan: Hemoglobin A1c (BFP), VENOUS COLLECTION,         Albumin Random Urine Quantitative with Creat         Ratio, insulin glargine (LANTUS PEN) 100         UNIT/ML pen, metFORMIN (GLUCOPHAGE-XR) 500 MG         24 hr tablet        OK to work toward goals off insulin, will need to monitor sugars and adjust slowly-maybe 3 units at a time down to keep sugars between     (E78.2) Mixed hyperlipidemia  Comment: controlled  Plan: atorvastatin (LIPITOR) 20 MG tablet        continue current medications at current doses       BMI:   Estimated body mass index is 28 kg/m  as calculated from the following:    Height as of this encounter: 1.816 m (5' 11.5\").    Weight as of this encounter: 92.4 kg (203 lb 9.6 oz).   Weight management plan: Discussed healthy diet and exercise guidelines        FUTURE APPOINTMENTS:       - Follow-up visit in 6 mo  Work on weight loss  Regular " exercise    No follow-ups on file.    Pop Pena MD  [facility]

## 2019-11-07 NOTE — NURSING NOTE
Lars Coleman is here for a diabetic check and medication refill.    Questioned patient about current smoking habits.  Pt. has never smoked.  Body mass index is 28 kg/m .  PULSE regular  My Chart: active  CLASSIFICATION OF OVERWEIGHT AND OBESITY BY BMI                        Obesity Class           BMI(kg/m2)  Underweight                                    < 18.5  Normal                                         18.5-24.9  Overweight                                     25.0-29.9  OBESITY                     I                  30.0-34.9                             II                 35.0-39.9  EXTREME OBESITY             III                >40                            Patient's  BMI Body mass index is 28 kg/m .  http://hin.nhlbi.nih.gov/menuplanner/menu.cgi    Pre-visit planning  Immunizations - up to date  Colonoscopy - is due and ordered today  Mammogram -   Asthma -   PHQ9 -    ANYA-7 -

## 2019-11-08 LAB — ABBOTT PSA - QUEST: 0.8 NG/ML

## 2019-12-15 ENCOUNTER — HEALTH MAINTENANCE LETTER (OUTPATIENT)
Age: 69
End: 2019-12-15

## 2019-12-28 DIAGNOSIS — Z79.4 TYPE 2 DIABETES MELLITUS WITHOUT COMPLICATION, WITH LONG-TERM CURRENT USE OF INSULIN (H): ICD-10-CM

## 2019-12-28 DIAGNOSIS — E11.9 TYPE 2 DIABETES MELLITUS WITHOUT COMPLICATION, WITH LONG-TERM CURRENT USE OF INSULIN (H): ICD-10-CM

## 2019-12-30 NOTE — TELEPHONE ENCOUNTER
Pending Prescriptions:                       Disp   Refills    insulin glargine (LANTUS PEN) 100 UNIT/ML*45 mL               Sig: INJECT 40 UNITS SUBCUTANEOUS AT BEDTIME    Refilled 11-7-2019 rtc in six months,  at ov would pt be out ?  Fax or deny

## 2020-02-18 DIAGNOSIS — E11.9 TYPE 2 DIABETES MELLITUS WITHOUT COMPLICATION, WITH LONG-TERM CURRENT USE OF INSULIN (H): ICD-10-CM

## 2020-02-18 DIAGNOSIS — Z79.4 TYPE 2 DIABETES MELLITUS WITHOUT COMPLICATION, WITH LONG-TERM CURRENT USE OF INSULIN (H): ICD-10-CM

## 2020-02-18 NOTE — TELEPHONE ENCOUNTER
Lars Coleman is requesting a refill of:    Pending Prescriptions:                       Disp   Refills    insulin glargine (LANTUS PEN) 100 UNIT/ML*15 mL  1            Sig: INJECT 25 UNITS SUBCUTANEOUS AT BEDTIME    Please close encounter if RX was sent. Thanks, Clara    Lab Results   Component Value Date    A1C 7.0 11/07/2019    A1C 6.8 04/30/2019    A1C 7.8 02/07/2019    A1C 7.5 10/11/2018    A1C 8.1 06/07/2018

## 2020-02-20 DIAGNOSIS — Z79.4 TYPE 2 DIABETES MELLITUS WITHOUT COMPLICATION, WITH LONG-TERM CURRENT USE OF INSULIN (H): ICD-10-CM

## 2020-02-20 DIAGNOSIS — E11.9 TYPE 2 DIABETES MELLITUS WITHOUT COMPLICATION, WITH LONG-TERM CURRENT USE OF INSULIN (H): ICD-10-CM

## 2020-02-20 NOTE — TELEPHONE ENCOUNTER
Lars Coleman is requesting a refill of:    Refused Prescriptions:                       Disp   Refills    insulin glargine 100 UNIT/ML SC pen [Pharm*15 mL  1        Sig: INJECT 45 UNITS SUBCUTANEOUS AT BEDTIME  Refused By: SIVAN BARFIELD  Reason for Refusal: Adjustment in Therapy

## 2020-04-11 DIAGNOSIS — E11.9 TYPE 2 DIABETES MELLITUS WITHOUT COMPLICATION, WITHOUT LONG-TERM CURRENT USE OF INSULIN (H): ICD-10-CM

## 2020-04-13 RX ORDER — FLURBIPROFEN SODIUM 0.3 MG/ML
SOLUTION/ DROPS OPHTHALMIC
Refills: 1 | COMMUNITY
Start: 2020-04-13

## 2020-04-13 NOTE — TELEPHONE ENCOUNTER
Patient last seen 11/07/2019  Needs office visit for blood work      Declined following meds [facility] notified  Pending Prescriptions:                       Disp   Refills    ULTICARE MINI 31G X 6 MM insulin pen need*       1            Sig: USE ONCE DAILY OR AS DIRECTED.

## 2020-06-15 DIAGNOSIS — E11.9 TYPE 2 DIABETES MELLITUS WITHOUT COMPLICATION, WITHOUT LONG-TERM CURRENT USE OF INSULIN (H): ICD-10-CM

## 2020-06-16 RX ORDER — FLURBIPROFEN SODIUM 0.3 MG/ML
SOLUTION/ DROPS OPHTHALMIC
Refills: 1 | COMMUNITY
Start: 2020-06-16

## 2020-06-16 NOTE — TELEPHONE ENCOUNTER
Lars Coleman is requesting a refill of:    Refused Prescriptions:                       Disp   Refills    ULTICARE MINI 31G X 6 MM insulin pen needl*       1        Sig: USE ONCE DAILY OR AS DIRECTED.  Refused By: MARVIN BRUCE  Reason for Refusal: Patient needs appointment

## 2020-06-19 DIAGNOSIS — E11.9 TYPE 2 DIABETES MELLITUS WITHOUT COMPLICATION, WITHOUT LONG-TERM CURRENT USE OF INSULIN (H): ICD-10-CM

## 2020-06-19 DIAGNOSIS — Z79.4 TYPE 2 DIABETES MELLITUS WITHOUT COMPLICATION, WITH LONG-TERM CURRENT USE OF INSULIN (H): ICD-10-CM

## 2020-06-19 DIAGNOSIS — E11.9 TYPE 2 DIABETES MELLITUS WITHOUT COMPLICATION, WITH LONG-TERM CURRENT USE OF INSULIN (H): ICD-10-CM

## 2020-06-19 RX ORDER — FLURBIPROFEN SODIUM 0.3 MG/ML
SOLUTION/ DROPS OPHTHALMIC
Refills: 1 | COMMUNITY
Start: 2020-06-19

## 2020-06-19 RX ORDER — INSULIN GLARGINE 100 [IU]/ML
INJECTION, SOLUTION SUBCUTANEOUS
Refills: 1 | COMMUNITY
Start: 2020-06-19

## 2020-06-19 NOTE — TELEPHONE ENCOUNTER
Lars Coleman is requesting a refill of:    Refused Prescriptions:                       Disp   Refills    LANTUS SOLOSTAR 100 UNIT/ML soln [Pharmacy*       1        Sig: INJECT 40 UNITS SUBCUTANEOUS AT BEDTIME  Refused By: SIVAN BARFIELD  Reason for Refusal: Patient needs appointment    ULTICARE MINI 31G X 6 MM insulin pen needl*       1        Sig: USE ONCE DAILY OR AS DIRECTED.  Refused By: SIVAN BARFIELD  Reason for Refusal: Patient needs appointment

## 2020-07-05 DIAGNOSIS — Z79.4 TYPE 2 DIABETES MELLITUS WITHOUT COMPLICATION, WITH LONG-TERM CURRENT USE OF INSULIN (H): ICD-10-CM

## 2020-07-05 DIAGNOSIS — E11.9 TYPE 2 DIABETES MELLITUS WITHOUT COMPLICATION, WITH LONG-TERM CURRENT USE OF INSULIN (H): ICD-10-CM

## 2020-07-06 RX ORDER — METFORMIN HCL 500 MG
2000 TABLET, EXTENDED RELEASE 24 HR ORAL
Qty: 360 TABLET | Refills: 1 | COMMUNITY
Start: 2020-07-06

## 2020-07-06 NOTE — TELEPHONE ENCOUNTER
Received incoming refill request for  Pending Prescriptions:                       Disp   Refills    metFORMIN (GLUCOPHAGE-XR) 500 MG 24 hr ta*360 ta*1            Sig: TAKE 4 TABLETS (2,000 MG) BY MOUTH DAILY (WITH           DINNER)    Patient last had a refill of this medication on 11/7/2019 for a 6 month supply. Patient has an appt tomorrow and will get full refills at that time.

## 2020-07-07 ENCOUNTER — OFFICE VISIT (OUTPATIENT)
Dept: FAMILY MEDICINE | Facility: CLINIC | Age: 70
End: 2020-07-07

## 2020-07-07 VITALS
RESPIRATION RATE: 20 BRPM | HEIGHT: 72 IN | SYSTOLIC BLOOD PRESSURE: 134 MMHG | WEIGHT: 212.4 LBS | BODY MASS INDEX: 28.77 KG/M2 | TEMPERATURE: 97.4 F | DIASTOLIC BLOOD PRESSURE: 72 MMHG | HEART RATE: 78 BPM

## 2020-07-07 DIAGNOSIS — E11.9 TYPE 2 DIABETES MELLITUS WITHOUT COMPLICATION, WITH LONG-TERM CURRENT USE OF INSULIN (H): ICD-10-CM

## 2020-07-07 DIAGNOSIS — Z79.4 TYPE 2 DIABETES MELLITUS WITHOUT COMPLICATION, WITH LONG-TERM CURRENT USE OF INSULIN (H): ICD-10-CM

## 2020-07-07 DIAGNOSIS — E78.2 MIXED HYPERLIPIDEMIA: ICD-10-CM

## 2020-07-07 LAB
ALBUMIN SERPL-MCNC: 4.7 G/DL (ref 3.6–5.1)
ALBUMIN/GLOB SERPL: 1.5 {RATIO} (ref 1–2.5)
ALP SERPL-CCNC: 88 U/L (ref 33–130)
ALT 1742-6: 28 U/L (ref 0–32)
AST 1920-8: 38 U/L (ref 0–35)
BILIRUB SERPL-MCNC: 0.9 MG/DL (ref 0.2–1.2)
BUN SERPL-MCNC: 15 MG/DL (ref 7–25)
BUN/CREATININE RATIO: 15.8 (ref 6–22)
CALCIUM SERPL-MCNC: 9.8 MG/DL (ref 8.6–10.3)
CHLORIDE SERPLBLD-SCNC: 100.1 MMOL/L (ref 98–110)
CHOLEST SERPL-MCNC: 224 MG/DL (ref 0–199)
CHOLEST/HDLC SERPL: 5 {RATIO} (ref 0–5)
CO2 SERPL-SCNC: 31.7 MMOL/L (ref 20–32)
CREAT SERPL-MCNC: 0.95 MG/DL (ref 0.7–1.18)
GLOBULIN, CALCULATED - QUEST: 3.1 (ref 1.9–3.7)
GLUCOSE SERPL-MCNC: 205 MG/DL (ref 60–99)
HBA1C MFR BLD: 8.5 % (ref 4–7)
HDLC SERPL-MCNC: 41 MG/DL (ref 40–150)
LDLC SERPL CALC-MCNC: 96 MG/DL (ref 0–130)
POTASSIUM SERPL-SCNC: 4.39 MMOL/L (ref 3.5–5.3)
PROT SERPL-MCNC: 7.8 G/DL (ref 6.1–8.1)
SODIUM SERPL-SCNC: 141.2 MMOL/L (ref 135–146)
TRIGL SERPL-MCNC: 435 MG/DL (ref 0–149)

## 2020-07-07 PROCEDURE — 36415 COLL VENOUS BLD VENIPUNCTURE: CPT | Performed by: FAMILY MEDICINE

## 2020-07-07 PROCEDURE — 80053 COMPREHEN METABOLIC PANEL: CPT | Performed by: FAMILY MEDICINE

## 2020-07-07 PROCEDURE — 99213 OFFICE O/P EST LOW 20 MIN: CPT | Performed by: FAMILY MEDICINE

## 2020-07-07 PROCEDURE — 80061 LIPID PANEL: CPT | Performed by: FAMILY MEDICINE

## 2020-07-07 PROCEDURE — 83036 HEMOGLOBIN GLYCOSYLATED A1C: CPT | Performed by: FAMILY MEDICINE

## 2020-07-07 RX ORDER — METFORMIN HCL 500 MG
2000 TABLET, EXTENDED RELEASE 24 HR ORAL
Qty: 360 TABLET | Refills: 1 | Status: SHIPPED | OUTPATIENT
Start: 2020-07-07 | End: 2020-10-15

## 2020-07-07 RX ORDER — ATORVASTATIN CALCIUM 20 MG/1
10 TABLET, FILM COATED ORAL DAILY
Qty: 45 TABLET | Refills: 1 | Status: SHIPPED | OUTPATIENT
Start: 2020-07-07 | End: 2021-05-03

## 2020-07-07 ASSESSMENT — MIFFLIN-ST. JEOR: SCORE: 1763.5

## 2020-07-07 NOTE — NURSING NOTE
Lars Coleman is here for a diabetic check and medication refill.    Questioned patient about current smoking habits.  Pt. has never smoked.  Body mass index is 29.21 kg/m .  PULSE regular  My Chart: active  CLASSIFICATION OF OVERWEIGHT AND OBESITY BY BMI                        Obesity Class           BMI(kg/m2)  Underweight                                    < 18.5  Normal                                         18.5-24.9  Overweight                                     25.0-29.9  OBESITY                     I                  30.0-34.9                             II                 35.0-39.9  EXTREME OBESITY             III                >40                            Patient's  BMI Body mass index is 29.21 kg/m .  http://hin.nhlbi.nih.gov/menuplanner/menu.cgi    Pre-visit planning  Immunizations - up to date  Colonoscopy - is up to date  Mammogram -   Asthma -   PHQ9 -    ANYA-7 -

## 2020-07-07 NOTE — PROGRESS NOTES
Subjective     Lars Coleman is a 69 year old male who presents to clinic today for the following health issues:    HPI   Diabetes Follow-up-insulin a 43 units, metformin    How often are you checking your blood sugar? One time daily  What time of day are you checking your blood sugars (select all that apply)?  Before meals  Have you had any blood sugars above 200?  Yes -some  Have you had any blood sugars below 70?  No    What symptoms do you notice when your blood sugar is low?  None    What concerns do you have today about your diabetes? None     Do you have any of these symptoms? (Select all that apply)  No numbness or tingling in feet.  No redness, sores or blisters on feet.  No complaints of excessive thirst.  No reports of blurry vision.  No significant changes to weight.    Have you had a diabetic eye exam in the last 12 months? Yes- Date of last eye exam: last fall,  Location: a        BP Readings from Last 2 Encounters:   07/07/20 134/72   11/07/19 124/70     Hemoglobin A1C (%)   Date Value   11/07/2019 7.0   04/30/2019 6.8     LDL Cholesterol Calculated (mg/dL (calc))   Date Value   02/07/2019 99   02/27/2018 76     LDL Cholesterol Direct (mg/dL)   Date Value   11/07/2019 109   04/30/2019 76               Hyperlipidemia Follow-Up      Are you regularly taking any medication or supplement to lower your cholesterol?   Yes- atorastatin    Are you having muscle aches or other side effects that you think could be caused by your cholesterol lowering medication?  No      How many servings of fruits and vegetables do you eat daily?  2-3    On average, how many sweetened beverages do you drink each day (Examples: soda, juice, sweet tea, etc.  Do NOT count diet or artificially sweetened beverages)?   1    How many days per week do you exercise enough to make your heart beat faster? 3 or less-was limited for many months due to sciatica-has tried to get back to exercise    How many minutes a day do you exercise  enough to make your heart beat faster? 10 - 19    How many days per week do you miss taking your medication? 0        Patient Active Problem List   Diagnosis     Mixed hyperlipidemia     Family history of malignant neoplasm of gastrointestinal tract     Calculus of kidney     Special screening for malignant neoplasm of prostate     Health Care Home     Type 2 diabetes mellitus without complication, with long-term current use of insulin (H)     Past Surgical History:   Procedure Laterality Date     APPENDECTOMY       HC VASECTOMY UNILAT/BILAT W POSTOP SEMEN      Vasectomy       Social History     Tobacco Use     Smoking status: Never Smoker     Smokeless tobacco: Never Used   Substance Use Topics     Alcohol use: Yes     Alcohol/week: 0.0 standard drinks     Comment: rare     Family History   Problem Relation Age of Onset     Cancer - colorectal Father         rectal cancer     C.A.D. No family hx of      Diabetes Brother      Prostate Cancer No family hx of      Cerebrovascular Disease Father         in his 40s (smoker)         Current Outpatient Medications   Medication Sig Dispense Refill     Acetylcarnitine HCl (ACETYL-L-CARNITINE HCL) POWD daily       Ascorbic Acid (VITAMIN C PO) Take by mouth daily       atorvastatin (LIPITOR) 20 MG tablet Take 0.5 tablets (10 mg) by mouth daily 45 tablet 1     blood glucose (ONETOUCH ULTRA) test strip TEST BLOOD SUGAR 4 TIMES DAILY 200 strip 1     blood glucose monitoring (ACCU-CHEK MULTICLIX) lancets Use to test blood sugar 3 times daily or as directed. 100 each 2     blood glucose monitoring (NO BRAND SPECIFIED) meter device kit Ultra Test Meter  Use to test blood sugar 4 times daily or as directed. 1 kit 0     Cholecalciferol (VITAMIN D PO) Take by mouth daily       coenzyme Q10 (CO-Q10) 30 MG capsule Take 1 capsule (30 mg) by mouth daily       Green Tea, Camillia sinensis, (GREEN TEA EXTRACT PO) Take by mouth daily       insulin glargine (LANTUS PEN) 100 UNIT/ML pen INJECT  25 UNITS SUBCUTANEOUS AT BEDTIME 15 mL 1     KRILL OIL PO Take 1 capsule by mouth daily       MAGNESIUM PO Take by mouth daily       metFORMIN (GLUCOPHAGE-XR) 500 MG 24 hr tablet Take 4 tablets (2,000 mg) by mouth daily (with dinner) 360 tablet 1     MILK THISTLE PO Take 1 tablet by mouth daily       Nutritional Supplements (NUTRITIONAL SUPPLEMENT PO) Cornitex, Cinsulin, TriSugar Shield, Nurish Green Energy, Amazing Grass Green Superfood       TAURINE PO Take 1 tablet by mouth daily       TURMERIC PO Take 1 tablet by mouth daily       ULTICARE MINI 31G X 6 MM insulin pen needle USE ONCE DAILY OR AS DIRECTED. 100 each 1     Allergies   Allergen Reactions     No Known Drug Allergies      Recent Labs   Lab Test 11/07/19  0940 11/07/19 04/30/19  1210 04/30/19  1007 02/07/19  0952 02/07/19  0940  02/27/18  0931  08/22/17  1124   A1C 7.0  --   --  6.8  --  7.8*   < >  --    < >  --    LDL  --  109 76  --  99  --   --  76  --  91   HDL  --  49 36*  --  33*  --   --  36*  --  30*   TRIG  --  237* 124  --  204*  --   --  146  --  242*   ALT  --   --   --   --  21  --   --  20  --  21   CR  --  1.01  --   --  0.88  --   --  0.85  --  0.87   GFRESTIMATED  --   --   --   --  89  --   --  91  --  90   POTASSIUM  --  5.16  --   --  4.3  --   --  4.2  --  4.5   TSH  --   --   --   --   --   --   --  2.13  --   --     < > = values in this interval not displayed.      BP Readings from Last 3 Encounters:   07/07/20 134/72   11/07/19 124/70   04/30/19 112/60    Wt Readings from Last 3 Encounters:   07/07/20 96.3 kg (212 lb 6.4 oz)   11/07/19 92.4 kg (203 lb 9.6 oz)   04/30/19 89.2 kg (196 lb 9.6 oz)                      Reviewed and updated as needed this visit by Provider         Review of Systems   Constitutional, HEENT, cardiovascular, pulmonary, gi and gu systems are negative, except as otherwise noted.      Objective    /72 (BP Location: Right arm, Patient Position: Chair, Cuff Size: Adult Large)   Pulse 78   Temp 97.4  F  "(36.3  C)   Resp 20   Ht 1.816 m (5' 11.5\")   Wt 96.3 kg (212 lb 6.4 oz)   BMI 29.21 kg/m    Body mass index is 29.21 kg/m .  Physical Exam   GENERAL: healthy, alert and no distress  EYES: Eyes grossly normal to inspection, PERRL and conjunctivae and sclerae normal  HENT: ear canals and TM's normal, nose and mouth without ulcers or lesions  NECK: no adenopathy, no asymmetry, masses, or scars and thyroid normal to palpation  RESP: lungs clear to auscultation - no rales, rhonchi or wheezes  CV: regular rate and rhythm, normal S1 S2, no S3 or S4, no murmur, click or rub, no peripheral edema and peripheral pulses strong  ABDOMEN: soft, nontender, no hepatosplenomegaly, no masses and bowel sounds normal  MS: no gross musculoskeletal defects noted, no edema  SKIN: no suspicious lesions or rashes and keratoses - seborrheic  NEURO: Normal strength and tone, mentation intact and speech normal  PSYCH: mentation appears normal, affect normal/bright  Diabetic foot exam: normal DP and PT pulses, no trophic changes or ulcerative lesions and normal sensory exam    Diagnostic Test Results:  Labs reviewed in Epic  Results for orders placed or performed in visit on 07/07/20 (from the past 24 hour(s))   Hemoglobin A1c (BFP)   Result Value Ref Range    Hemoglobin A1C 8.5 (A) 4.0 - 7.0 %           Assessment & Plan   Assessment      Plan  (E11.9,  Z79.4) Type 2 diabetes mellitus without complication, with long-term current use of insulin (H)  Comment: loss of control due to lack of exercise and weight gain  Plan: Hemoglobin A1c (BFP), VENOUS COLLECTION,         insulin glargine (LANTUS PEN) 100 UNIT/ML pen,         metFORMIN (GLUCOPHAGE-XR) 500 MG 24 hr tablet,         Comprehensive Metobolic Panel (BFP)        Pt will work hard on healthier habits, continue current medications at current doses     (E78.2) Mixed hyperlipidemia  Comment: control uncertain  Plan: atorvastatin (LIPITOR) 20 MG tablet,         Comprehensive Metobolic " "Panel (BFP), Lipid         Panel (BFP)        continue current medications at current doses       BMI:   Estimated body mass index is 29.21 kg/m  as calculated from the following:    Height as of this encounter: 1.816 m (5' 11.5\").    Weight as of this encounter: 96.3 kg (212 lb 6.4 oz).             FUTURE APPOINTMENTS:       - Follow-up visit in 3 mo  Work on weight loss  Regular exercise    No follow-ups on file.    Pop Pena MD  [facility]            "

## 2020-07-10 DIAGNOSIS — E11.9 TYPE 2 DIABETES MELLITUS WITHOUT COMPLICATION, WITHOUT LONG-TERM CURRENT USE OF INSULIN (H): ICD-10-CM

## 2020-07-10 RX ORDER — FLURBIPROFEN SODIUM 0.3 MG/ML
SOLUTION/ DROPS OPHTHALMIC
Qty: 100 EACH | Refills: 1 | Status: SHIPPED | OUTPATIENT
Start: 2020-07-10 | End: 2020-11-15

## 2020-07-10 NOTE — TELEPHONE ENCOUNTER
Patient called into the CS line stating that he needs a refill of  Pending Prescriptions:                       Disp   Refills    blood glucose (ONETOUCH ULTRA) test strip 200 st*1            Sig: TEST BLOOD SUGAR 4 TIMES DAILY    insulin pen needle (ULTICARE MINI) 31G X *100 ea*1            Sig: Use 4 pen needles daily or as directed.    Routing to Dr. Pena for review.

## 2020-08-06 DIAGNOSIS — E11.9 TYPE 2 DIABETES MELLITUS WITHOUT COMPLICATION, WITH LONG-TERM CURRENT USE OF INSULIN (H): ICD-10-CM

## 2020-08-06 DIAGNOSIS — Z79.4 TYPE 2 DIABETES MELLITUS WITHOUT COMPLICATION, WITH LONG-TERM CURRENT USE OF INSULIN (H): ICD-10-CM

## 2020-08-06 RX ORDER — INSULIN GLARGINE 100 [IU]/ML
INJECTION, SOLUTION SUBCUTANEOUS
Refills: 1 | COMMUNITY
Start: 2020-08-06

## 2020-08-06 NOTE — TELEPHONE ENCOUNTER
Received incoming refill request for  Pending Prescriptions:                       Disp   Refills    LANTUS SOLOSTAR 100 UNIT/ML soln [Pharmac*       1            Sig: INJECT 25 UNITS SUBCUTANEOUS AT BEDTIME    Patient last had a refill of this medication 7/7/2020. This should not need to be refilled yet at this time.

## 2020-09-06 DIAGNOSIS — Z79.4 TYPE 2 DIABETES MELLITUS WITHOUT COMPLICATION, WITH LONG-TERM CURRENT USE OF INSULIN (H): ICD-10-CM

## 2020-09-06 DIAGNOSIS — E11.9 TYPE 2 DIABETES MELLITUS WITHOUT COMPLICATION, WITH LONG-TERM CURRENT USE OF INSULIN (H): ICD-10-CM

## 2020-09-08 RX ORDER — INSULIN GLARGINE 100 [IU]/ML
INJECTION, SOLUTION SUBCUTANEOUS
Refills: 0 | COMMUNITY
Start: 2020-09-08

## 2020-09-08 NOTE — TELEPHONE ENCOUNTER
Refused Prescriptions:                       Disp   Refills    LANTUS SOLOSTAR 100 UNIT/ML soln [Pharmacy*       0        Sig: INJECT 43 UNITS SUBCUTANEOUS AT BEDTIME  Refused By: JACY VILLALTA  Reason for Refusal: Patient needs appointment      Last filled 7/7/20 to recheck in 3 months per [facility]'s notes.  Pt due for a diabetic/a1c med check.

## 2020-10-10 DIAGNOSIS — Z79.4 TYPE 2 DIABETES MELLITUS WITHOUT COMPLICATION, WITH LONG-TERM CURRENT USE OF INSULIN (H): ICD-10-CM

## 2020-10-10 DIAGNOSIS — E11.9 TYPE 2 DIABETES MELLITUS WITHOUT COMPLICATION, WITH LONG-TERM CURRENT USE OF INSULIN (H): ICD-10-CM

## 2020-10-12 RX ORDER — INSULIN GLARGINE 100 [IU]/ML
INJECTION, SOLUTION SUBCUTANEOUS
Refills: 0 | COMMUNITY
Start: 2020-10-12

## 2020-10-12 NOTE — TELEPHONE ENCOUNTER
Pt last seen on 7/7/20 to f/u in 3 months.  Pt is due for a diabetic a1c med check.      Refused Prescriptions:                       Disp   Refills    LANTUS SOLOSTAR 100 UNIT/ML soln [Pharmacy*       0        Sig: INJECT 43 UNITS SUBCUTANEOUS AT BEDTIME

## 2020-10-15 DIAGNOSIS — E11.9 TYPE 2 DIABETES MELLITUS WITHOUT COMPLICATION, WITH LONG-TERM CURRENT USE OF INSULIN (H): ICD-10-CM

## 2020-10-15 DIAGNOSIS — Z79.4 TYPE 2 DIABETES MELLITUS WITHOUT COMPLICATION, WITH LONG-TERM CURRENT USE OF INSULIN (H): ICD-10-CM

## 2020-10-15 RX ORDER — METFORMIN HCL 500 MG
2000 TABLET, EXTENDED RELEASE 24 HR ORAL
Qty: 40 TABLET | Refills: 0 | COMMUNITY
Start: 2020-10-15 | End: 2020-10-30

## 2020-10-15 NOTE — TELEPHONE ENCOUNTER
Signed Prescriptions:                        Disp   Refills    insulin glargine (LANTUS PEN) 100 UNIT/ML *15 mL  0        Sig: Inject 43 Units Subcutaneous At Bedtime  Authorizing Provider: DAVID JOSE  Ordering User: EARLINE VASQUEZ    metFORMIN (GLUCOPHAGE-XR) 500 MG 24 hr tab*40 tab*0        Sig: Take 4 tablets (2,000 mg) by mouth daily (with           dinner)  Authorizing Provider: DAVID JOSE  Ordering User: EARLINE VASQUEZ    Called short supply til appt 10/23/2020

## 2020-10-30 ENCOUNTER — OFFICE VISIT (OUTPATIENT)
Dept: FAMILY MEDICINE | Facility: CLINIC | Age: 70
End: 2020-10-30

## 2020-10-30 VITALS
HEART RATE: 90 BPM | TEMPERATURE: 98.2 F | DIASTOLIC BLOOD PRESSURE: 76 MMHG | BODY MASS INDEX: 28.88 KG/M2 | OXYGEN SATURATION: 96 % | WEIGHT: 210 LBS | RESPIRATION RATE: 20 BRPM | SYSTOLIC BLOOD PRESSURE: 134 MMHG

## 2020-10-30 DIAGNOSIS — Z12.11 SPECIAL SCREENING FOR MALIGNANT NEOPLASMS, COLON: ICD-10-CM

## 2020-10-30 DIAGNOSIS — Z79.4 TYPE 2 DIABETES MELLITUS WITHOUT COMPLICATION, WITH LONG-TERM CURRENT USE OF INSULIN (H): Primary | ICD-10-CM

## 2020-10-30 DIAGNOSIS — E11.9 TYPE 2 DIABETES MELLITUS WITHOUT COMPLICATION, WITH LONG-TERM CURRENT USE OF INSULIN (H): Primary | ICD-10-CM

## 2020-10-30 DIAGNOSIS — E78.2 MIXED HYPERLIPIDEMIA: ICD-10-CM

## 2020-10-30 LAB
ALBUMIN SERPL-MCNC: 4.7 G/DL (ref 3.6–5.1)
ALBUMIN URINE MG/G CR: <30 MG/G CREATININE
ALBUMIN URINE MG/SPEC: 10
ALBUMIN/GLOB SERPL: 1.6 {RATIO} (ref 1–2.5)
ALP SERPL-CCNC: 80 U/L (ref 33–130)
ALT 1742-6: 37 U/L (ref 0–32)
AST 1920-8: 30 U/L (ref 0–35)
BILIRUB SERPL-MCNC: 0.8 MG/DL (ref 0.2–1.2)
BUN SERPL-MCNC: 17 MG/DL (ref 7–25)
BUN/CREATININE RATIO: 17 (ref 6–22)
CALCIUM SERPL-MCNC: 10.1 MG/DL (ref 8.6–10.3)
CHLORIDE SERPLBLD-SCNC: 106.6 MMOL/L (ref 98–110)
CO2 SERPL-SCNC: 25.6 MMOL/L (ref 20–32)
CREAT SERPL-MCNC: 1 MG/DL (ref 0.7–1.18)
CREATININE URINE: 100
GLOBULIN, CALCULATED - QUEST: 3 (ref 1.9–3.7)
GLUCOSE SERPL-MCNC: 153 MG/DL (ref 60–99)
HBA1C MFR BLD: 9.5 % (ref 4–7)
POTASSIUM SERPL-SCNC: 5.56 MMOL/L (ref 3.5–5.3)
PROT SERPL-MCNC: 7.7 G/DL (ref 6.1–8.1)
SODIUM SERPL-SCNC: 143.4 MMOL/L (ref 135–146)

## 2020-10-30 PROCEDURE — 83036 HEMOGLOBIN GLYCOSYLATED A1C: CPT | Performed by: FAMILY MEDICINE

## 2020-10-30 PROCEDURE — 99213 OFFICE O/P EST LOW 20 MIN: CPT | Mod: 25 | Performed by: FAMILY MEDICINE

## 2020-10-30 PROCEDURE — 82043 UR ALBUMIN QUANTITATIVE: CPT | Performed by: FAMILY MEDICINE

## 2020-10-30 PROCEDURE — 90686 IIV4 VACC NO PRSV 0.5 ML IM: CPT | Performed by: FAMILY MEDICINE

## 2020-10-30 PROCEDURE — G0008 ADMIN INFLUENZA VIRUS VAC: HCPCS | Performed by: FAMILY MEDICINE

## 2020-10-30 PROCEDURE — 80053 COMPREHEN METABOLIC PANEL: CPT | Performed by: FAMILY MEDICINE

## 2020-10-30 RX ORDER — METFORMIN HCL 500 MG
2000 TABLET, EXTENDED RELEASE 24 HR ORAL
Qty: 360 TABLET | Refills: 1 | Status: SHIPPED | OUTPATIENT
Start: 2020-10-30 | End: 2021-06-30

## 2020-10-30 NOTE — NURSING NOTE
Chief Complaint   Patient presents with     Diabetes     3 month diabetic medication check and review     Pre-visit Screening:  Immunizations:  up to date  Colonoscopy:  is due and ordered today  Mammogram: NA  Asthma Action Test/Plan:  NA  PHQ9:  PHQ-2 done today  GAD7:  No concerns  Questioned patient about current smoking habits Pt. has never smoked.  Ok to leave detailed message on voice mail for today's visit only Yes, phone # [phone]

## 2020-10-30 NOTE — PROGRESS NOTES
Subjective     Lars Coleman is a 70 year old male who presents to clinic today for the following health issues:    HPI         Diabetes Follow-up, insulin 43 units, metformin    Pt was up at [employer] all summer, did not do walking due to bears etc    How often are you checking your blood sugar? One time daily  What time of day are you checking your blood sugars (select all that apply)?  Before meals  Have you had any blood sugars above 200?  Yes many, netter in last week  Have you had any blood sugars below 70?  No    What symptoms do you notice when your blood sugar is low?  None    What concerns do you have today about your diabetes? Blood sugar is often over 200     Do you have any of these symptoms? (Select all that apply)  No numbness or tingling in feet.  No redness, sores or blisters on feet.  No complaints of excessive thirst.  No reports of blurry vision.  No significant changes to weight.    Have you had a diabetic eye exam in the last 12 months? No        BP Readings from Last 2 Encounters:   10/30/20 134/76   07/07/20 134/72     Hemoglobin A1C (%)   Date Value   07/07/2020 8.5 (A)   11/07/2019 7.0     LDL Cholesterol Calculated (mg/dL (calc))   Date Value   02/07/2019 99   02/27/2018 76     LDL Cholesterol Direct (mg/dL)   Date Value   07/07/2020 96   11/07/2019 109               Hyperlipidemia Follow-Up      Are you regularly taking any medication or supplement to lower your cholesterol?   Yes- atrovatstatin    Are you having muscle aches or other side effects that you think could be caused by your cholesterol lowering medication?  No      How many servings of fruits and vegetables do you eat daily?  2-3    On average, how many sweetened beverages do you drink each day (Examples: soda, juice, sweet tea, etc.  Do NOT count diet or artificially sweetened beverages)?   1    How many days per week do you exercise enough to make your heart beat faster? 6    How many minutes a day do you exercise enough to  make your heart beat faster? 30 - 60    How many days per week do you miss taking your medication? 0        Review of Systems   Constitutional, HEENT, cardiovascular, pulmonary, gi and gu systems are negative, except as otherwise noted.      Objective    /76 (BP Location: Right arm, Patient Position: Sitting, Cuff Size: Adult Large)   Pulse 90   Temp 98.2  F (36.8  C) (Oral)   Resp 20   Wt 95.3 kg (210 lb)   SpO2 96%   BMI 28.88 kg/m    Body mass index is 28.88 kg/m .  Physical Exam   GENERAL: healthy, alert and no distress  EYES: Eyes grossly normal to inspection, PERRL and conjunctivae and sclerae normal  HENT: ear canals and TM's normal, nose and mouth without ulcers or lesions  NECK: no adenopathy, no asymmetry, masses, or scars and thyroid normal to palpation  RESP: lungs clear to auscultation - no rales, rhonchi or wheezes  CV: regular rate and rhythm, normal S1 S2, no S3 or S4, no murmur, click or rub, no peripheral edema and peripheral pulses strong  ABDOMEN: soft, nontender, no hepatosplenomegaly, no masses and bowel sounds normal  MS: no gross musculoskeletal defects noted, no edema  SKIN: no suspicious lesions or rashes  NEURO: Normal strength and tone, mentation intact and speech normal  PSYCH: mentation appears normal, affect normal/bright  Diabetic foot exam: normal DP and PT pulses, no trophic changes or ulcerative lesions and normal sensory exam    Results for orders placed or performed in visit on 10/30/20 (from the past 24 hour(s))   Hemoglobin A1c (BFP)   Result Value Ref Range    Hemoglobin A1C 9.5 (A) 4.0 - 7.0 %   Albumin Random Urine Quantitative with Creat Ratio   Result Value Ref Range    Albumin Urine mg/spec 10 30    Albumin Urine mg/g Cr <30 30 MG/G Creatinine    Creatinine Urine 100 300           Assessment & Plan     Type 2 diabetes mellitus without complication, with long-term current use of insulin (H)  Poorly controlled, pt admits to very little exercise and poor diet all  summer as he was  at [employer] working at a [employer], discussed need to improve control, he is certain he can bring sugars down drastically-is not walking daily for an hour and eating better-wants to hold on med changes and work on his habits    continue current medications at current doses in that case, recheck 3 mo, at that time if not better needs meds/MTM    - Albumin Random Urine Quantitative with Creat Ratio  - Hemoglobin A1c (BFP)  - metFORMIN (GLUCOPHAGE-XR) 500 MG 24 hr tablet  Dispense: 360 tablet; Refill: 1  - Comprehensive Metobolic Panel (BFP)    Mixed hyperlipidemia  Control uncertain, continue current medications at current doses   - Comprehensive Metobolic Panel (BFP)    Special screening for malignant neoplasms, colon    - GASTROENTEROLOGY ADULT REF PROCEDURE ONLY          FUTURE APPOINTMENTS:       - Follow-up visit in 3 mo  Work on weight loss  Regular exercise    No follow-ups on file.    Pop Pena MD  [facility]

## 2020-11-14 ENCOUNTER — HOSPITAL ENCOUNTER (EMERGENCY)
Facility: CLINIC | Age: 70
Discharge: HOME | End: 2020-11-15
Attending: EMERGENCY MEDICINE | Admitting: EMERGENCY MEDICINE
Payer: COMMERCIAL

## 2020-11-14 DIAGNOSIS — R10.13 EPIGASTRIC PAIN: ICD-10-CM

## 2020-11-14 DIAGNOSIS — R11.2 NON-INTRACTABLE VOMITING WITH NAUSEA, UNSPECIFIED VOMITING TYPE: ICD-10-CM

## 2020-11-14 DIAGNOSIS — K86.9 PANCREATIC LESION: ICD-10-CM

## 2020-11-14 LAB
ALBUMIN SERPL-MCNC: 4 G/DL (ref 3.4–5)
ALP SERPL-CCNC: 104 U/L (ref 40–150)
ALT SERPL W P-5'-P-CCNC: 60 U/L (ref 0–70)
ANION GAP SERPL CALCULATED.3IONS-SCNC: 6 MMOL/L (ref 3–14)
AST SERPL W P-5'-P-CCNC: 32 U/L (ref 0–45)
BASE EXCESS BLDV CALC-SCNC: 0.6 MMOL/L
BASOPHILS # BLD AUTO: 0 10E9/L (ref 0–0.2)
BASOPHILS NFR BLD AUTO: 0.3 %
BILIRUB SERPL-MCNC: 0.7 MG/DL (ref 0.2–1.3)
BUN SERPL-MCNC: 18 MG/DL (ref 7–30)
CALCIUM SERPL-MCNC: 9.3 MG/DL (ref 8.5–10.1)
CHLORIDE SERPL-SCNC: 104 MMOL/L (ref 94–109)
CO2 SERPL-SCNC: 26 MMOL/L (ref 20–32)
CREAT SERPL-MCNC: 0.68 MG/DL (ref 0.66–1.25)
DIFFERENTIAL METHOD BLD: ABNORMAL
EOSINOPHIL # BLD AUTO: 0 10E9/L (ref 0–0.7)
EOSINOPHIL NFR BLD AUTO: 0 %
ERYTHROCYTE [DISTWIDTH] IN BLOOD BY AUTOMATED COUNT: 11.8 % (ref 10–15)
GFR SERPL CREATININE-BSD FRML MDRD: >90 ML/MIN/{1.73_M2}
GLUCOSE SERPL-MCNC: 281 MG/DL (ref 70–99)
HCO3 BLDV-SCNC: 27 MMOL/L (ref 21–28)
HCT VFR BLD AUTO: 47.9 % (ref 40–53)
HGB BLD-MCNC: 15.7 G/DL (ref 13.3–17.7)
IMM GRANULOCYTES # BLD: 0 10E9/L (ref 0–0.4)
IMM GRANULOCYTES NFR BLD: 0.4 %
KETONES BLD-SCNC: 0.9 MMOL/L (ref 0–0.6)
LACTATE BLD-SCNC: 2.6 MMOL/L (ref 0.7–2)
LIPASE SERPL-CCNC: 540 U/L (ref 73–393)
LYMPHOCYTES # BLD AUTO: 0.7 10E9/L (ref 0.8–5.3)
LYMPHOCYTES NFR BLD AUTO: 7.4 %
MCH RBC QN AUTO: 29.7 PG (ref 26.5–33)
MCHC RBC AUTO-ENTMCNC: 32.8 G/DL (ref 31.5–36.5)
MCV RBC AUTO: 91 FL (ref 78–100)
MONOCYTES # BLD AUTO: 0.3 10E9/L (ref 0–1.3)
MONOCYTES NFR BLD AUTO: 2.6 %
NEUTROPHILS # BLD AUTO: 8.7 10E9/L (ref 1.6–8.3)
NEUTROPHILS NFR BLD AUTO: 89.3 %
NRBC # BLD AUTO: 0 10*3/UL
NRBC BLD AUTO-RTO: 0 /100
O2/TOTAL GAS SETTING VFR VENT: ABNORMAL %
PCO2 BLDV: 50 MM HG (ref 40–50)
PH BLDV: 7.35 PH (ref 7.32–7.43)
PLATELET # BLD AUTO: 214 10E9/L (ref 150–450)
PO2 BLDV: 22 MM HG (ref 25–47)
POTASSIUM SERPL-SCNC: 4.1 MMOL/L (ref 3.4–5.3)
PROT SERPL-MCNC: 8.3 G/DL (ref 6.8–8.8)
RBC # BLD AUTO: 5.29 10E12/L (ref 4.4–5.9)
SODIUM SERPL-SCNC: 136 MMOL/L (ref 133–144)
WBC # BLD AUTO: 9.8 10E9/L (ref 4–11)

## 2020-11-14 PROCEDURE — 85025 COMPLETE CBC W/AUTO DIFF WBC: CPT | Performed by: EMERGENCY MEDICINE

## 2020-11-14 PROCEDURE — 250N000011 HC RX IP 250 OP 636: Performed by: EMERGENCY MEDICINE

## 2020-11-14 PROCEDURE — 83605 ASSAY OF LACTIC ACID: CPT | Performed by: EMERGENCY MEDICINE

## 2020-11-14 PROCEDURE — 83690 ASSAY OF LIPASE: CPT | Performed by: EMERGENCY MEDICINE

## 2020-11-14 PROCEDURE — 96361 HYDRATE IV INFUSION ADD-ON: CPT

## 2020-11-14 PROCEDURE — 93005 ELECTROCARDIOGRAM TRACING: CPT

## 2020-11-14 PROCEDURE — 82010 KETONE BODYS QUAN: CPT | Performed by: EMERGENCY MEDICINE

## 2020-11-14 PROCEDURE — 99285 EMERGENCY DEPT VISIT HI MDM: CPT | Mod: 25

## 2020-11-14 PROCEDURE — 82803 BLOOD GASES ANY COMBINATION: CPT | Performed by: EMERGENCY MEDICINE

## 2020-11-14 PROCEDURE — 96374 THER/PROPH/DIAG INJ IV PUSH: CPT | Mod: 59

## 2020-11-14 PROCEDURE — 80053 COMPREHEN METABOLIC PANEL: CPT | Performed by: EMERGENCY MEDICINE

## 2020-11-14 RX ORDER — ONDANSETRON 2 MG/ML
4 INJECTION INTRAMUSCULAR; INTRAVENOUS ONCE
Status: COMPLETED | OUTPATIENT
Start: 2020-11-14 | End: 2020-11-14

## 2020-11-14 RX ADMIN — ONDANSETRON 4 MG: 2 INJECTION INTRAMUSCULAR; INTRAVENOUS at 22:45

## 2020-11-14 NOTE — ED AVS SNAPSHOT
[facility]  [address]  [facility] [address]  Phone: [phone]  Fax: [phone]                                    Lars Coleman   MRN: [id number]    Department: [facility]   Date of Visit: 11/14/2020           After Visit Summary Signature Page    I have received my discharge instructions, and my questions have been answered. I have discussed any challenges I see with this plan with the nurse or doctor.    ..........................................................................................................................................  Patient/Patient Representative Signature      ..........................................................................................................................................  Patient Representative Print Name and Relationship to Patient    ..................................................               ................................................  Date                                   Time    ..........................................................................................................................................  Reviewed by Signature/Title    ...................................................              ..............................................  Date                                               Time          22EPIC Rev 08/18

## 2020-11-15 ENCOUNTER — APPOINTMENT (OUTPATIENT)
Dept: ULTRASOUND IMAGING | Facility: CLINIC | Age: 70
End: 2020-11-15
Attending: EMERGENCY MEDICINE
Payer: COMMERCIAL

## 2020-11-15 ENCOUNTER — APPOINTMENT (OUTPATIENT)
Dept: CT IMAGING | Facility: CLINIC | Age: 70
End: 2020-11-15
Attending: EMERGENCY MEDICINE
Payer: COMMERCIAL

## 2020-11-15 VITALS
HEART RATE: 106 BPM | SYSTOLIC BLOOD PRESSURE: 171 MMHG | DIASTOLIC BLOOD PRESSURE: 73 MMHG | OXYGEN SATURATION: 96 % | TEMPERATURE: 98.1 F | RESPIRATION RATE: 20 BRPM

## 2020-11-15 LAB
ALBUMIN UR-MCNC: 20 MG/DL
APPEARANCE UR: CLEAR
BILIRUB UR QL STRIP: NEGATIVE
COLOR UR AUTO: ABNORMAL
GLUCOSE UR STRIP-MCNC: >1000 MG/DL
HGB UR QL STRIP: NEGATIVE
KETONES UR STRIP-MCNC: 80 MG/DL
LACTATE BLD-SCNC: 2.5 MMOL/L (ref 0.7–2)
LEUKOCYTE ESTERASE UR QL STRIP: NEGATIVE
MUCOUS THREADS #/AREA URNS LPF: PRESENT /LPF
NITRATE UR QL: NEGATIVE
PH UR STRIP: 5.5 PH (ref 5–7)
RBC #/AREA URNS AUTO: 1 /HPF (ref 0–2)
SOURCE: ABNORMAL
SP GR UR STRIP: 1.03 (ref 1–1.03)
UROBILINOGEN UR STRIP-MCNC: NORMAL MG/DL (ref 0–2)
WBC #/AREA URNS AUTO: 1 /HPF (ref 0–5)

## 2020-11-15 PROCEDURE — 250N000011 HC RX IP 250 OP 636: Performed by: EMERGENCY MEDICINE

## 2020-11-15 PROCEDURE — 81001 URINALYSIS AUTO W/SCOPE: CPT | Performed by: EMERGENCY MEDICINE

## 2020-11-15 PROCEDURE — 250N000009 HC RX 250: Performed by: EMERGENCY MEDICINE

## 2020-11-15 PROCEDURE — 258N000003 HC RX IP 258 OP 636: Performed by: EMERGENCY MEDICINE

## 2020-11-15 PROCEDURE — 74177 CT ABD & PELVIS W/CONTRAST: CPT

## 2020-11-15 PROCEDURE — 83605 ASSAY OF LACTIC ACID: CPT | Performed by: EMERGENCY MEDICINE

## 2020-11-15 PROCEDURE — 76705 ECHO EXAM OF ABDOMEN: CPT

## 2020-11-15 PROCEDURE — 250N000013 HC RX MED GY IP 250 OP 250 PS 637: Performed by: EMERGENCY MEDICINE

## 2020-11-15 RX ORDER — ONDANSETRON 4 MG/1
4 TABLET, ORALLY DISINTEGRATING ORAL EVERY 6 HOURS PRN
Qty: 10 TABLET | Refills: 0 | Status: SHIPPED | OUTPATIENT
Start: 2020-11-15 | End: 2020-11-18

## 2020-11-15 RX ORDER — ONDANSETRON 2 MG/ML
INJECTION INTRAMUSCULAR; INTRAVENOUS
Status: DISCONTINUED
Start: 2020-11-15 | End: 2020-11-15 | Stop reason: HOSPADM

## 2020-11-15 RX ORDER — OXYCODONE HYDROCHLORIDE 5 MG/1
5 TABLET ORAL ONCE
Status: COMPLETED | OUTPATIENT
Start: 2020-11-15 | End: 2020-11-15

## 2020-11-15 RX ORDER — OXYCODONE HYDROCHLORIDE 5 MG/1
5 TABLET ORAL EVERY 6 HOURS PRN
Qty: 10 TABLET | Refills: 0 | Status: SHIPPED | OUTPATIENT
Start: 2020-11-15 | End: 2020-11-30

## 2020-11-15 RX ORDER — IOPAMIDOL 755 MG/ML
500 INJECTION, SOLUTION INTRAVASCULAR ONCE
Status: COMPLETED | OUTPATIENT
Start: 2020-11-15 | End: 2020-11-15

## 2020-11-15 RX ORDER — ONDANSETRON 2 MG/ML
4 INJECTION INTRAMUSCULAR; INTRAVENOUS ONCE
Status: COMPLETED | OUTPATIENT
Start: 2020-11-15 | End: 2020-11-15

## 2020-11-15 RX ADMIN — SODIUM CHLORIDE 1000 ML: 9 INJECTION, SOLUTION INTRAVENOUS at 00:35

## 2020-11-15 RX ADMIN — ONDANSETRON 4 MG: 2 INJECTION INTRAMUSCULAR; INTRAVENOUS at 00:35

## 2020-11-15 RX ADMIN — IOPAMIDOL 100 ML: 755 INJECTION, SOLUTION INTRAVENOUS at 00:49

## 2020-11-15 RX ADMIN — OXYCODONE HYDROCHLORIDE 5 MG: 5 TABLET ORAL at 00:26

## 2020-11-15 RX ADMIN — SODIUM CHLORIDE 65 ML: 9 INJECTION, SOLUTION INTRAVENOUS at 00:50

## 2020-11-15 ASSESSMENT — ENCOUNTER SYMPTOMS
NAUSEA: 1
DYSURIA: 0
DIFFICULTY URINATING: 0
FEVER: 0
DIARRHEA: 0
ABDOMINAL PAIN: 1
VOMITING: 0

## 2020-11-15 NOTE — ED TRIAGE NOTES
Pt presents to ED due to upper abdominal pain, vomiting, generalized weakness for today.  ABCs intact

## 2020-11-15 NOTE — DISCHARGE INSTRUCTIONS
Discharge Instructions  Incidental Findings - Pancreatic lesion    An incidental finding is something unexpected that was found while you were being treated and is felt to not be related to the reason that you came to the Emergency Department.  While this finding is not an emergency, you need to follow up with your primary provider (or occasionally a specialist) to determine if anything should be done about it.    These findings can come from:  Checking your vital signs (example: high blood pressure).  Taking your history (example: unexplained weight loss).  The physical exam (example: a heart murmur).  Laboratory study (example: anemia or low blood count).  X-rays/ultrasound/CT or other imaging (example: an unexplained mass).    Generally, every Emergency Department visit should have a follow-up clinic visit with either a primary or a specialty clinic/provider. Please follow-up as instructed by your emergency provider today.    Return to the Emergency Department if:  Your condition worsens.  You develop unexpected pain.  You now develop new symptoms or have new concerns.  If you were given a prescription for medicine here today, be sure to read all of the information (including the package insert) that comes with your prescription.  This will include important information about the medicine, its side effects, and any warnings that you need to know about.  The pharmacist who fills the prescription can provide more information and answer questions you may have about the medicine.  If you have questions or concerns that the pharmacist cannot address, please call or return to the Emergency Department.   Remember that you can always come back to the Emergency Department if you are not able to see your regular provider in the amount of time listed above, if you get any new symptoms, or if there is anything that worries you.

## 2020-11-15 NOTE — ED PROVIDER NOTES
History     Chief Complaint:  Abdominal Pain    HPI  Lars Coleman is a 70 year old male with a history of type II diabetes who presents for evaluation of epigastric abdominal pain. He reports this pain started earlier in the day and has been constant and associated with nausea, but he has not yet vomited. This evening, he took a dose of leftover hydrocodone but when that did not provide relief, he decided to present to the ED. He denies any associated fever, diarrhea, or urinary symptoms. He does report recently passing a kidney stone but states this pain feels different. He does report drinking a small glass of wine last night, before the onset of pain, but denies other alcohol use and states he does not drink routinely. BG at home was 270.     Allergies:  No Known Drug Allergies    Medications:    Atorvastatin   Lantus insulin  Metformin   Taurine   Tumeric  Milk thistle  Magnesium    Krill oil   Green tea extract   Coenzyme Q10     Past Medical History:    Kidney stone   Hyperlipidemia  Squamous cell carcinoma of skin   Type II diabetes    Past Surgical History:    Appendectomy   Vasectomy      Family History:    Colorectal cancer   Cerebrovascular disease  Diabetes      Social History:  Marital Status: .   Wife drove him to the ED.   Negative for tobacco use.  Positive for alcohol use. Comment: Rare.   Negative for drug use.     Review of Systems   Constitutional: Negative for fever.   Gastrointestinal: Positive for abdominal pain and nausea. Negative for diarrhea and vomiting.   Genitourinary: Negative for difficulty urinating and dysuria.   All other systems reviewed and are negative.    Physical Exam     Patient Vitals for the past 24 hrs:   BP Temp Temp src Pulse Resp SpO2   11/15/20 0030 171/73 -- -- 106 -- 96 %   11/15/20 0015 171/77 -- -- 104 -- 91 %   11/15/20 0005 -- -- -- -- -- 95 %   11/15/20 0004 169/84 -- -- 102 -- --   11/14/20 2201 177/131 98.1  F (36.7  C) Oral 102 20 98 %       Physical Exam  Nursing note and vitals reviewed.  Constitutional: Cooperative.   HENT:   Mouth/Throat: Mucous membranes are normal.   Cardiovascular: Normal rate, regular rhythm and normal heart sounds.  No murmur.  Pulmonary/Chest: Effort normal and breath sounds normal. No respiratory distress.   Abdominal: Soft. Normal appearance and bowel sounds are normal. No distension. There is epigastric tenderness. There is no rigidity and no guarding.   Neurological: Alert. Oriented x4  Skin: Skin is warm and dry.   Psychiatric: Normal mood and affect.     Emergency Department Course     ECG:  Indication: Epigastric Abdominal Pain  Time: 2207  Vent. Rate 90 bpm. VA interval 180. QRS duration 92. QT/QTc 370/452. P-R-T axis 58 -10 15.    Normal sinus rhythm. Read time: 0130.    Imaging:  Radiologic findings were communicated with the patient who voiced understanding of the findings.  CT Abdomen/Pelvis with IV contrast:   1.  Well-defined 2.8 x 2.7 cm cystic mass in the pancreatic head. No evidence for acute pancreatic inflammation. 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   2.  Fatty infiltration of the liver. Tiny calcified granuloma in the left hepatic lobe, as per radiology.    US Abdomen Limited:  1.  Hepatic steatosis.   2.  No evidence of gallstones or cholecystitis, as per radiology.     Laboratory:  Laboratory findings were communicated with the patient who voiced understanding of the findings.  CBC: WBC: 9.8, HGB: 15.7, PLT: 214  CMP: Glucose 281 (H), o/w WNL (Creatinine: 0.68)  Lipase: 540 (H)    Lactic acid (Resulted: 2248): 2.6 (H)  Lactic acid (Resulted: 0141): 2.5 (H) - after 1 liter IVF    Ketone beta hydroxybutyrate: 0.9 (H)    Blood gas venous: pO2: 22 (L), o/w WNL    UA with Microscopic: Glucose: >1000 (A), Ketones: 80 (A), Albumin: 20 (A), Mucous: Present (a), o/w WNL     Interventions:  2245 Zofran, 4 mg, IV injection   0026 Oxycodone, 5 mg, PO  0035 NS 1L IV     Zofran, 4 mg, IV injection     Emergency Department Course:  Nursing notes and vitals reviewed.   IV was inserted and blood was drawn for laboratory testing, results above. Medicine administered as documented above.    0015: I performed an exam of the patient as documented above.     The patient provided a urine sample here in the emergency department. This was sent for laboratory testing, findings above.   The patient was sent for an abdominal ultrasound as well as a CT abdomen/pelvis while in the emergency department, results above.      0155: I rechecked the patient and discussed the results of his workup; he expresses improvement in his symptoms and so we discussed home treatment plan.     Findings and plan explained to the Patient. Patient discharged home with instructions regarding supportive care, medications, and reasons to return. The importance of close follow-up was reviewed. The patient was prescribed oxycodone and Zofran.     I personally reviewed the laboratory and imaging results with the Patient and answered all related questions prior to discharge.    Impression & Plan      CMS Diagnosis: The Lactic acid level is elevated due to dehydration secondary to vomiting, at this time there is no sign of severe sepsis or septic shock.    Medical Decision Making:   Lars Coleman is a 70 year old male who presents with epigastric pain as described in the HPI. Work up shows a mildly elevated lipase in the setting of a cystic lesion in the pancreas. This is likely a pseudocyst vs possible neoplasm. I did discuss this with the patient and he will follow-up for further testing and imaging going forwards with his primary physician. No other cause of acute pain identified. No gallstone, bowel obstruction, or acute surgical process. This is unlikely to represent a cardiac equivalent. Lactic acid was mildly elevated and he had been vomiting and this is trending down after one bag of fluid, I doubt ongoing bowel  ischemia. Plan of care will be discharge with clear liquids, medicine as needed, and close primary care follow-up with return instructions.     Diagnosis:     ICD-10-CM    1. Epigastric pain  R10.13    2. Pancreatic lesion  K86.9    3. Non-intractable vomiting with nausea, unspecified vomiting type  R11.2        Disposition:   Discharged to home.    Discharge Medications:  New Prescriptions    ONDANSETRON (ZOFRAN ODT) 4 MG ODT TAB    Take 1 tablet (4 mg) by mouth every 6 hours as needed for nausea    OXYCODONE (ROXICODONE) 5 MG TABLET    Take 1 tablet (5 mg) by mouth every 6 hours as needed for pain      Scribe Disclosure:  IGrace, am serving as a scribe on 11/15/2020 at 12:16 AM to personally document services performed by Rohit Cordero MD based on my observations and the provider's statements to me.       EMERGENCY DEPARTMENT     Rohit Cordero MD  11/15/20 0222

## 2020-11-16 LAB — INTERPRETATION ECG - MUSE: NORMAL

## 2020-11-24 DIAGNOSIS — E11.9 TYPE 2 DIABETES MELLITUS WITHOUT COMPLICATION, WITH LONG-TERM CURRENT USE OF INSULIN (H): ICD-10-CM

## 2020-11-24 DIAGNOSIS — Z79.4 TYPE 2 DIABETES MELLITUS WITHOUT COMPLICATION, WITH LONG-TERM CURRENT USE OF INSULIN (H): ICD-10-CM

## 2020-11-24 RX ORDER — INSULIN GLARGINE 100 [IU]/ML
INJECTION, SOLUTION SUBCUTANEOUS
Qty: 45 ML | Refills: 0 | Status: SHIPPED | OUTPATIENT
Start: 2020-11-24 | End: 2021-03-10

## 2020-11-24 NOTE — TELEPHONE ENCOUNTER
Lars Coleman is requesting a refill of:    Pending Prescriptions:                       Disp   Refills    LANTUS SOLOSTAR 100 UNIT/ML soln [Pharmac*45 mL  0            Sig: INJECT 43 UNITS SUBQ AT BEDTIME.    Lab Results   Component Value Date    A1C 9.5 10/30/2020    A1C 8.5 07/07/2020    A1C 7.0 11/07/2019    A1C 6.8 04/30/2019    A1C 7.8 02/07/2019     Please close encounter if RX was sent. Thanks, Clara

## 2020-11-30 ENCOUNTER — OFFICE VISIT (OUTPATIENT)
Dept: FAMILY MEDICINE | Facility: CLINIC | Age: 70
End: 2020-11-30

## 2020-11-30 VITALS
BODY MASS INDEX: 28.33 KG/M2 | OXYGEN SATURATION: 100 % | HEIGHT: 72 IN | HEART RATE: 101 BPM | WEIGHT: 209.2 LBS | DIASTOLIC BLOOD PRESSURE: 76 MMHG | TEMPERATURE: 99 F | SYSTOLIC BLOOD PRESSURE: 136 MMHG

## 2020-11-30 DIAGNOSIS — K86.9 PANCREATIC LESION: Primary | ICD-10-CM

## 2020-11-30 DIAGNOSIS — E11.9 TYPE 2 DIABETES MELLITUS WITHOUT COMPLICATION, WITH LONG-TERM CURRENT USE OF INSULIN (H): ICD-10-CM

## 2020-11-30 DIAGNOSIS — R11.2 NON-INTRACTABLE VOMITING WITH NAUSEA, UNSPECIFIED VOMITING TYPE: ICD-10-CM

## 2020-11-30 DIAGNOSIS — Z79.4 TYPE 2 DIABETES MELLITUS WITHOUT COMPLICATION, WITH LONG-TERM CURRENT USE OF INSULIN (H): ICD-10-CM

## 2020-11-30 DIAGNOSIS — R10.13 EPIGASTRIC PAIN: ICD-10-CM

## 2020-11-30 PROCEDURE — 99213 OFFICE O/P EST LOW 20 MIN: CPT | Performed by: FAMILY MEDICINE

## 2020-11-30 ASSESSMENT — MIFFLIN-ST. JEOR: SCORE: 1743.98

## 2020-11-30 NOTE — PROGRESS NOTES
"Lars Coleman is a 70 year old male here for ER f/u    Subjective       HPI       Hospital Follow-up Visit:    Hospital/Nursing Home/IP Rehab Facility: [facility]  Date of Admission: 11/14/2020  Date of Discharge: 11/14/2020  Reason(s) for Admission: Abd pain nausea weak, also noted to have pancreatic lesion      Was your hospitalization related to COVID-19? No   Problems taking medications regularly:  None  Medication changes since discharge: None  Problems adhering to non-medication therapy:  None    Summary of hospitalization:  [facility] discharge summary reviewed  Diagnostic Tests/Treatments reviewed.  Follow up needed: for cyst  Other Healthcare Providers Involved in Patient s Care:         None  Update since discharge: improved.       Post Discharge Medication Reconciliation: discharge medications reconciled and changed, per note/orders.  Plan of care communicated with patient                       Review of Systems   Constitutional, HEENT, cardiovascular, pulmonary, gi and gu systems are negative, except as otherwise noted.       Objective      /76   Pulse 101   Temp 99  F (37.2  C) (Oral)   Ht 1.816 m (5' 11.5\")   Wt 94.9 kg (209 lb 3.2 oz)   SpO2 100%   BMI 28.77 kg/m       Vitals:  S1 and S2 normal, no murmurs, clicks, gallops or rubs. Regular rate and rhythm. Chest is clear; no wheezes or rales. No edema or JVD.   The abdomen is soft without tenderness, guarding, mass, rebound or organomegaly. Bowel sounds are normal. No CVA tenderness or inguinal adenopathy noted.             Assessment/Plan:    Assessment & Plan     Epigastric pain  Resolved    Pancreatic lesion  Discussed options for monitoring, recommend he d/w I  - GASTROENTEROLOGY ADULT REF CONSULT ONLY    Non-intractable vomiting with nausea, unspecified vomiting type  resolved    Type 2 diabetes mellitus without complication, with long-term current use of insulin (H)            CONSULTATION/REFERRAL to " GI  Regular exercise    No follow-ups on file.    Pop Pena MD  [facility]

## 2020-12-08 ENCOUNTER — OFFICE VISIT (OUTPATIENT)
Dept: FAMILY MEDICINE | Facility: CLINIC | Age: 70
End: 2020-12-08

## 2020-12-08 VITALS
HEART RATE: 86 BPM | HEIGHT: 71 IN | WEIGHT: 210 LBS | SYSTOLIC BLOOD PRESSURE: 126 MMHG | BODY MASS INDEX: 29.4 KG/M2 | DIASTOLIC BLOOD PRESSURE: 78 MMHG | OXYGEN SATURATION: 96 % | TEMPERATURE: 97.1 F

## 2020-12-08 DIAGNOSIS — H93.13 TINNITUS, BILATERAL: ICD-10-CM

## 2020-12-08 DIAGNOSIS — R39.15 URINARY URGENCY: ICD-10-CM

## 2020-12-08 DIAGNOSIS — G47.00 PERSISTENT INSOMNIA: ICD-10-CM

## 2020-12-08 DIAGNOSIS — R25.1 TREMOR: ICD-10-CM

## 2020-12-08 DIAGNOSIS — Z00.00 INITIAL MEDICARE ANNUAL WELLNESS VISIT: Primary | ICD-10-CM

## 2020-12-08 PROCEDURE — G0438 PPPS, INITIAL VISIT: HCPCS | Performed by: PHYSICIAN ASSISTANT

## 2020-12-08 RX ORDER — BLOOD SUGAR DIAGNOSTIC
STRIP MISCELLANEOUS
COMMUNITY
Start: 2020-11-24 | End: 2021-01-12

## 2020-12-08 RX ORDER — FLURBIPROFEN SODIUM 0.3 MG/ML
SOLUTION/ DROPS OPHTHALMIC
COMMUNITY
Start: 2020-11-27 | End: 2021-10-26

## 2020-12-08 ASSESSMENT — MIFFLIN-ST. JEOR: SCORE: 1743.64

## 2020-12-08 NOTE — PATIENT INSTRUCTIONS
Lars is doing well on his health maintenance.   Plan:  Colonoscopy- Due for this 3/2019, was ordered 10/30/2020 so would just need to contact [facility] to schedule.   Pneumonia Shots- completed  Shingles Shot- Could consider completing this 2 step vaccination Shigrix. Check with insurance, best to go to pharmacy.  Flu Shot- annually, completed for 2020.   Tetanus Shot- done in 11/2012 due in 11/2022.  Eye Exam- annually, will send release of information to optometrist.   Lipid/cholesterol Checks- As indicated with atorvastatin.  PSA- Checked 11/2019 and normal, recheck if indicated by symptoms or family history.   Dentist- Twice annually  Hearing Exam- Going to [facility] for hearing evaluation, especially with ringing.    Insomnia:  Trial of melatonin    Tremor:  Return for focused visit.    Urinary frequency:  Stable, okay to try pelvic floor exercises at home, and return for focused visit to consider urology, physical therapy.

## 2020-12-08 NOTE — PROGRESS NOTES
Lars Coleman is a 70 year old male who presents for Medicare Annual Wellness Visit.    Current providers caring for this patient include:  Patient Care Team:  Pop Pena MD as PCP - General (Family Practice)  Pop Pena MD as Assigned PCP    Complete Medical and Social history reviewed with patient, outlined below.    Patient Active Problem List   Diagnosis     Mixed hyperlipidemia     Family history of malignant neoplasm of gastrointestinal tract     Calculus of kidney     Special screening for malignant neoplasm of prostate     Health Care Home     Type 2 diabetes mellitus without complication, with long-term current use of insulin (H)       Past Medical History:   Diagnosis Date     Kidney stone      Mixed hyperlipidemia      Pancreatic lesion 11/2020    Noted in ED, needs follow up.     Squamous cell carcinoma of skin of face      Type 2 diabetes mellitus        Past Surgical History:   Procedure Laterality Date     APPENDECTOMY       VASECTOMY         Family History   Problem Relation Age of Onset     Cancer - colorectal Father         rectal cancer     Cerebrovascular Disease Father         in his 40s (smoker)     Diabetes Brother      C.A.D. No family hx of      Prostate Cancer No family hx of        Social History     Tobacco Use     Smoking status: Never Smoker     Smokeless tobacco: Never Used   Substance Use Topics     Alcohol use: Yes     Alcohol/week: 0.0 standard drinks     Comment: rare       Diet: regular, low salt/low fat  Physical Activity: patient exercises 5 times weekly  Depression Screen:    Over the past 2 weeks, patient has felt down, depressed, or hopeless:  No    Over the past 2 weeks, patient has felt little interest or pleasure in doing things: No    Functional ability/Safety screen:  Up and go test (able to get up and walk longer than 30 seconds): Passed  Patient needs assistance with: nothing  Patient's home has the following possible safety concerns:  "none identified  Patient has concerns about his hearing:  Yes -- tinnitis    Cognitive Screen  Patient repeats three objects (ball, flag, tree)      Clock drawing test: Passed  NORMAL  Recalls three objects after 3 minutes (ball,flag,tree):                                                                                               recalls 3 objects (3 points)    Physical Exam:  /78   Pulse 86   Temp 97.1  F (36.2  C) (Oral)   Ht 1.81 m (5' 11.25\")   Wt 95.3 kg (210 lb)   SpO2 96%   BMI 29.08 kg/m     Body mass index is 29.08 kg/m .     Assessment/Plan:    AWV:  Lars is doing well on his health maintenance.   Plan:  Colonoscopy- Due for this 3/2019, was ordered 10/30/2020 so would just need to contact [facility] to schedule.   Pneumonia Shots- completed  Shingles Shot- Could consider completing this 2 step vaccination Shigrix. Check with insurance, best to go to pharmacy.  Flu Shot- annually, completed for 2020.   Tetanus Shot- done in 11/2012 due in 11/2022.  Eye Exam- annually, will send release of information to optometrist.   Lipid/cholesterol Checks- As indicated with atorvastatin.  PSA- Checked 11/2019 and normal, recheck if indicated by symptoms or family history.   Dentist- Twice annually  Hearing Exam- Going to [facility] for hearing evaluation, especially with ringing.    Insomnia:  Trial of melatonin    Tremor:  Return for focused visit.    Urinary frequency:  Stable, okay to try pelvic floor exercises at home, and return for focused visit to consider urology, physical therapy.     Fatimah Caldwell PA-C  12/8/2020             "

## 2020-12-09 ENCOUNTER — TELEPHONE (OUTPATIENT)
Dept: FAMILY MEDICINE | Facility: CLINIC | Age: 70
End: 2020-12-09

## 2020-12-31 ENCOUNTER — TRANSFERRED RECORDS (OUTPATIENT)
Dept: FAMILY MEDICINE | Facility: CLINIC | Age: 70
End: 2020-12-31

## 2021-01-06 DIAGNOSIS — Z11.59 ENCOUNTER FOR SCREENING FOR OTHER VIRAL DISEASES: Primary | ICD-10-CM

## 2021-01-07 ENCOUNTER — TRANSFERRED RECORDS (OUTPATIENT)
Dept: HEALTH INFORMATION MANAGEMENT | Facility: CLINIC | Age: 71
End: 2021-01-07

## 2021-01-07 ENCOUNTER — OFFICE VISIT (OUTPATIENT)
Dept: FAMILY MEDICINE | Facility: CLINIC | Age: 71
End: 2021-01-07

## 2021-01-07 VITALS
RESPIRATION RATE: 20 BRPM | HEART RATE: 90 BPM | SYSTOLIC BLOOD PRESSURE: 148 MMHG | TEMPERATURE: 97.4 F | HEIGHT: 71 IN | BODY MASS INDEX: 29.96 KG/M2 | WEIGHT: 214 LBS | DIASTOLIC BLOOD PRESSURE: 72 MMHG

## 2021-01-07 DIAGNOSIS — Z79.4 TYPE 2 DIABETES MELLITUS WITHOUT COMPLICATION, WITH LONG-TERM CURRENT USE OF INSULIN (H): ICD-10-CM

## 2021-01-07 DIAGNOSIS — E11.9 TYPE 2 DIABETES MELLITUS WITHOUT COMPLICATION, WITH LONG-TERM CURRENT USE OF INSULIN (H): ICD-10-CM

## 2021-01-07 DIAGNOSIS — K86.2 PANCREATIC CYST: ICD-10-CM

## 2021-01-07 DIAGNOSIS — Z01.818 PRE-OP EXAM: Primary | ICD-10-CM

## 2021-01-07 LAB
ERYTHROCYTE [DISTWIDTH] IN BLOOD BY AUTOMATED COUNT: 12.1 %
HBA1C MFR BLD: 9.8 % (ref 40–7)
HCT VFR BLD AUTO: 46.1 % (ref 40–53)
HEMOGLOBIN: 15.4 G/DL (ref 13.3–17.7)
MCH RBC QN AUTO: 30.3 PG (ref 26–33)
MCHC RBC AUTO-ENTMCNC: 33.4 G/DL (ref 31–36)
MCV RBC AUTO: 90.5 FL (ref 78–100)
PLATELET COUNT - QUEST: 167 10^9/L (ref 150–375)
RBC # BLD AUTO: 4.09 10*12/L (ref 4.4–5.9)
WBC # BLD AUTO: 5.5 10*9/L (ref 4–11)

## 2021-01-07 PROCEDURE — 85027 COMPLETE CBC AUTOMATED: CPT | Performed by: FAMILY MEDICINE

## 2021-01-07 PROCEDURE — 36415 COLL VENOUS BLD VENIPUNCTURE: CPT | Performed by: FAMILY MEDICINE

## 2021-01-07 PROCEDURE — 99214 OFFICE O/P EST MOD 30 MIN: CPT | Performed by: FAMILY MEDICINE

## 2021-01-07 PROCEDURE — 83036 HEMOGLOBIN GLYCOSYLATED A1C: CPT | Performed by: FAMILY MEDICINE

## 2021-01-07 ASSESSMENT — MIFFLIN-ST. JEOR
SCORE: 1739.68
SCORE: 1761.79

## 2021-01-07 NOTE — PROGRESS NOTES
[facility] PHYSICIANS  [address] [place] [address]  [address]  [facility] [address]  [phone]  Dept: [phone]    PRE-OP EVALUATION:  Today's date: 2021    Lars Coleman (: 1950) presents for pre-operative evaluation assessment as requested by Dr. Tracy.  He requires evaluation and anesthesia risk assessment prior to undergoing surgery/procedure for treatment of  Pancreatic cyst.    Proposed Surgery/ Procedure: esophagogastroduodenoscopy with FNA  Date of Surgery/ Procedure: 21  Time of Surgery/ Procedure: 1:10  Hospital/Surgical Facility: [facility]  Surgery Fax Number: Note does not need to be faxed, will be available electronically in Epic.  Primary Physician: Pop Pena  Type of Anesthesia Anticipated: to be determined    Preoperative Questionnaire:   No - Have you ever had a heart attack or stroke?  No - Have you ever had surgery on your heart or blood vessels, such as a stent, coronary (heart) bypass, or surgery on an artery in the head, neck, heart, or legs?  No - Do you have chest pain when you are physically active?  No - Do you have a history of heart failure?  No - Do you currently have a cold, bronchitis, or symptoms of other respiratory (head and chest) infections?  No - Do you have a cough, shortness of breath, or wheezing?  No - Do you or anyone in your family have a history of blood clots?  No - Do you or anyone in your family have a serious bleeding problem, such as long-lasting bleeding after surgeries or cuts?  No - Have you ever had anemia or been told to take iron pills?  No - Have you had any abnormal blood loss such as black, tarry or bloody stools, or abnormal vaginal bleeding?  No - Have you ever had a blood transfusion?  Yes - Are you willing to have a blood transfusion if it is medically needed before, during, or after your surgery?  No - Have you or anyone in your family ever had problems with anesthesia (sedation for surgery)?  No - Do you have sleep  apnea, excessive snoring, or daytime drowsiness?   No - Do you have any artifical heart valves or other implanted medical devices, such as a pacemaker, defibrillator, or continuous glucose monitor?  No - Do you have any artifical joints?  No - Are you allergic to latex?  No - Is there any chance that you may be pregnant?    Patient has a Health Care Directive or Living Will:  YES     HPI:     HPI related to upcoming procedure: pancreatic cyst      See problem list for active medical problems.  Problems all longstanding and stable, except as noted/documented.  See ROS for pertinent symptoms related to these conditions.    DIABETES - Patient has a longstanding history of DiabetesType Type II . Patient is being treated with oral agents and insulin and denies significant side effects. Control has been fair. Complicating factors include but are not limited to: hyperlipidemia.       MEDICAL HISTORY:     Patient Active Problem List    Diagnosis Date Noted     Tinnitus, bilateral 12/08/2020     Priority: Medium     Tremor 12/08/2020     Priority: Medium     Urinary urgency 12/08/2020     Priority: Medium     Persistent insomnia 12/08/2020     Priority: Medium     Doing trial of melatonin 12/2020       Type 2 diabetes mellitus without complication, with long-term current use of insulin (H) 08/22/2017     Priority: Medium     Special screening for malignant neoplasm of prostate 07/22/2015     Priority: Medium       Advance Care Planning 3/9/2016: ACP Review of Chart / Resources Provided:  Reviewed chart for advance care plan.  Lars Coleman has no plan or code status on file. Discussed available resources and provided with information. Confirmed code status reflects current choices pending further ACP discussions.  Confirmed/documented legally designated decision makers.  Added by Jacey Pringle               Calculus of kidney 02/10/2004     Priority: Medium     Mixed hyperlipidemia 11/07/2002     Priority: Medium     Family  history of malignant neoplasm of gastrointestinal tract 11/07/2002     Priority: Medium     Health Care Home 03/09/2016     Priority: Low      Past Medical History:   Diagnosis Date     Kidney stone      Mixed hyperlipidemia      Pancreatic lesion 11/2020    Noted in ED, needs follow up.     Squamous cell carcinoma of skin of face      Type 2 diabetes mellitus      Past Surgical History:   Procedure Laterality Date     APPENDECTOMY       VASECTOMY       Current Outpatient Medications   Medication Sig Dispense Refill     Acetylcarnitine HCl (ACETYL-L-CARNITINE HCL) POWD daily       Ascorbic Acid (VITAMIN C PO) Take by mouth daily       atorvastatin (LIPITOR) 20 MG tablet Take 0.5 tablets (10 mg) by mouth daily 45 tablet 1     Cholecalciferol (VITAMIN D PO) Take by mouth daily       coenzyme Q10 (CO-Q10) 30 MG capsule Take 1 capsule (30 mg) by mouth daily       Green Tea, Camillia sinensis, (GREEN TEA EXTRACT PO) Take by mouth daily       KRILL OIL PO Take 1 capsule by mouth daily       LANTUS SOLOSTAR 100 UNIT/ML soln INJECT 43 UNITS SUBQ AT BEDTIME. 45 mL 0     MAGNESIUM PO Take by mouth daily       metFORMIN (GLUCOPHAGE-XR) 500 MG 24 hr tablet Take 4 tablets (2,000 mg) by mouth daily (with dinner) 360 tablet 1     MILK THISTLE PO Take 1 tablet by mouth daily       Nutritional Supplements (NUTRITIONAL SUPPLEMENT PO) Cornitex, Cinsulin, TriSugar Shield, Nurish Green Energy, Amazing Grass Green Superfood       ONETOUCH ULTRA test strip TEST BLOOD SUGAR 4 TIMES DAILY       TAURINE PO Take 1 tablet by mouth daily       TURMERIC PO Take 1 tablet by mouth daily       ULTICARE MINI 31G X 6 MM insulin pen needle USE 4 PEN NEEDLES DAILY OR AS DIRECTED.       OTC products: no recent use of OTC ASA, NSAIDS or Steroids    Allergies   Allergen Reactions     No Known Drug Allergies       Latex Allergy: NO    Social History     Tobacco Use     Smoking status: Never Smoker     Smokeless tobacco: Never Used   Substance Use Topics  "    Alcohol use: Yes     Alcohol/week: 0.0 standard drinks     Comment: rare     History   Drug Use No       REVIEW OF SYSTEMS:   CONSTITUTIONAL: NEGATIVE for fever, chills, change in weight  ENT/MOUTH: NEGATIVE for ear, mouth and throat problems  RESP: NEGATIVE for significant cough or SOB  CV: NEGATIVE for chest pain, palpitations or peripheral edema    EXAM:   BP (!) 148/72 (BP Location: Left arm, Patient Position: Chair, Cuff Size: Adult Regular)   Pulse 90   Temp 97.4  F (36.3  C)   Resp 20   Ht 1.81 m (5' 11.25\")   Wt 97.1 kg (214 lb)   BMI 29.64 kg/m    GENERAL APPEARANCE: healthy, alert and no distress  HENT: ear canals and TM's normal and nose and mouth without ulcers or lesions  RESP: lungs clear to auscultation - no rales, rhonchi or wheezes  CV: regular rate and rhythm, normal S1 S2, no S3 or S4 and no murmur, click or rub   ABDOMEN: soft, nontender, no HSM or masses and bowel sounds normal  NEURO: Normal strength and tone, sensory exam grossly normal, mentation intact and speech normal    DIAGNOSTICS:     EKG: Not indicated due to non-vascular surgery and last ekg on 11/20 (within 30 days for CAD history or last year for cardiac risk factors)  Labs Resulted Today:   Results for orders placed or performed in visit on 01/07/21   Hemogram Platelet (BFP)     Status: Abnormal   Result Value Ref Range    WBC 5.5 4.0 - 11 10*9/L    RBC Count 4.09 (A) 4.4 - 5.9 10*12/L    Hemoglobin 15.4 13.3 - 17.7 g/dL    Hematocrit 46.1 40.0 - 53.0 %    MCV 90.5 78 - 100 fL    MCH 30.3 26 - 33 pg    MCHC 33.4 31 - 36 g/dL    RDW 12.1 %    Platelet Count 167 150 - 375 10^9/L   Hemoglobin A1c (BFP)     Status: Abnormal   Result Value Ref Range    Hemoglobin A1C 9.8 (A) 40 - 7.0 %       Recent Labs   Lab Test 11/14/20  2239 10/30/20 07/07/20  0949 07/22/15  0929 07/22/15  0929   HGB 15.7  --   --   --  16.5     --   --   --  162    143.4  --    < >  --    POTASSIUM 4.1 5.56*  --    < >  --    CR 0.68 1.00  -- "    < >  --    A1C  --  9.5* 8.5*   < >  --     < > = values in this interval not displayed.        IMPRESSION:   Reason for surgery/procedure: pancreatic cyst    Poorly controlled Type 2 Diabetes- surgeon informed, pt will be referred to [facility] pharm D for medication management going forard    The proposed surgical procedure is considered LOW risk.    REVISED CARDIAC RISK INDEX  The patient has the following serious cardiovascular risks for perioperative complications such as (MI, PE, VFib and 3  AV Block):  Diabetes Mellitus (on Insulin)  INTERPRETATION: 1 risks: Class II (low risk - 0.9% complication rate)    The patient has the following additional risks for perioperative complications:  No identified additional risks      ICD-10-CM    1. Pre-op exam  Z01.818 Hemogram Platelet (BFP)     VENOUS COLLECTION   2. Pancreatic cyst  K86.2    3. Type 2 diabetes mellitus without complication, with long-term current use of insulin (H)  E11.9 Hemoglobin A1c (BFP)    Z79.4 Other Specialty Referral       RECOMMENDATIONS:         Pt to hold meds day of surgery until home and eating    APPROVAL GIVEN to proceed with proposed procedure, without further diagnostic evaluation       Signed Electronically by: Pop Pena MD    Copy of this evaluation report is provided to requesting physician.    [place] Preop Guidelines    Revised Cardiac Risk Index

## 2021-01-07 NOTE — NURSING NOTE
Lars Coleman is here for a pre-op exam.    Questioned patient about current smoking habits.  Pt. has never smoked.  PULSE regular  My Chart: active  CLASSIFICATION OF OVERWEIGHT AND OBESITY BY BMI                        Obesity Class           BMI(kg/m2)  Underweight                                    < 18.5  Normal                                         18.5-24.9  Overweight                                     25.0-29.9  OBESITY                     I                  30.0-34.9                             II                 35.0-39.9  EXTREME OBESITY             III                >40                            Patient's  BMI Body mass index is 31.05 kg/m .  http://hin.nhlbi.nih.gov/menuplanner/menu.cgi  Pre-visit planning  Immunizations - up to date  Colonoscopy - ordered at last OV  Mammogram -   Asthma -   PHQ9 -    ANYA-7 -

## 2021-01-08 DIAGNOSIS — Z11.59 ENCOUNTER FOR SCREENING FOR OTHER VIRAL DISEASES: ICD-10-CM

## 2021-01-08 LAB
SARS-COV-2 RNA RESP QL NAA+PROBE: NORMAL
SPECIMEN SOURCE: NORMAL

## 2021-01-09 LAB
LABORATORY COMMENT REPORT: NORMAL
SARS-COV-2 RNA RESP QL NAA+PROBE: NEGATIVE
SPECIMEN SOURCE: NORMAL

## 2021-01-11 ENCOUNTER — HOSPITAL ENCOUNTER (OUTPATIENT)
Facility: CLINIC | Age: 71
Discharge: HOME | End: 2021-01-11
Attending: INTERNAL MEDICINE | Admitting: INTERNAL MEDICINE
Payer: COMMERCIAL

## 2021-01-11 ENCOUNTER — ANESTHESIA EVENT (OUTPATIENT)
Dept: SURGERY | Facility: CLINIC | Age: 71
End: 2021-01-11
Payer: COMMERCIAL

## 2021-01-11 ENCOUNTER — ANESTHESIA (OUTPATIENT)
Dept: SURGERY | Facility: CLINIC | Age: 71
End: 2021-01-11
Payer: COMMERCIAL

## 2021-01-11 VITALS
BODY MASS INDEX: 29.43 KG/M2 | HEART RATE: 109 BPM | SYSTOLIC BLOOD PRESSURE: 125 MMHG | RESPIRATION RATE: 16 BRPM | TEMPERATURE: 97.7 F | WEIGHT: 210.2 LBS | OXYGEN SATURATION: 98 % | HEIGHT: 71 IN | DIASTOLIC BLOOD PRESSURE: 82 MMHG

## 2021-01-11 LAB
AMYLASE FLD-CCNC: 9562 U/L
CEA FLD-MCNC: 70 UG/L
CEA FLD-MCNC: NORMAL NG/ML
CREAT SERPL-MCNC: 0.86 MG/DL (ref 0.66–1.25)
GFR SERPL CREATININE-BSD FRML MDRD: 88 ML/MIN/{1.73_M2}
GLUCOSE BLDC GLUCOMTR-MCNC: 168 MG/DL (ref 70–99)
GLUCOSE BLDC GLUCOMTR-MCNC: 208 MG/DL (ref 70–99)
POTASSIUM SERPL-SCNC: 3.8 MMOL/L (ref 3.4–5.3)
UPPER EUS: NORMAL

## 2021-01-11 PROCEDURE — 36415 COLL VENOUS BLD VENIPUNCTURE: CPT | Performed by: ANESTHESIOLOGY

## 2021-01-11 PROCEDURE — 88108 CYTOPATH CONCENTRATE TECH: CPT | Mod: TC | Performed by: INTERNAL MEDICINE

## 2021-01-11 PROCEDURE — 82378 CARCINOEMBRYONIC ANTIGEN: CPT | Performed by: INTERNAL MEDICINE

## 2021-01-11 PROCEDURE — 82565 ASSAY OF CREATININE: CPT | Performed by: ANESTHESIOLOGY

## 2021-01-11 PROCEDURE — 82150 ASSAY OF AMYLASE: CPT | Performed by: INTERNAL MEDICINE

## 2021-01-11 PROCEDURE — 84132 ASSAY OF SERUM POTASSIUM: CPT | Performed by: ANESTHESIOLOGY

## 2021-01-11 PROCEDURE — 999N000141 HC STATISTIC PRE-PROCEDURE NURSING ASSESSMENT: Performed by: INTERNAL MEDICINE

## 2021-01-11 PROCEDURE — 88108 CYTOPATH CONCENTRATE TECH: CPT | Mod: 26 | Performed by: PATHOLOGY

## 2021-01-11 PROCEDURE — 360N000076 HC SURGERY LEVEL 3, PER MIN: Performed by: INTERNAL MEDICINE

## 2021-01-11 PROCEDURE — 999N000060 HC STATISTIC EUS (OR PROCEDURE): Performed by: INTERNAL MEDICINE

## 2021-01-11 PROCEDURE — 370N000017 HC ANESTHESIA TECHNICAL FEE, PER MIN: Performed by: INTERNAL MEDICINE

## 2021-01-11 PROCEDURE — 250N000009 HC RX 250: Performed by: NURSE ANESTHETIST, CERTIFIED REGISTERED

## 2021-01-11 PROCEDURE — 88313 SPECIAL STAINS GROUP 2: CPT | Mod: 26 | Performed by: PATHOLOGY

## 2021-01-11 PROCEDURE — 258N000003 HC RX IP 258 OP 636: Performed by: ANESTHESIOLOGY

## 2021-01-11 PROCEDURE — 272N000001 HC OR GENERAL SUPPLY STERILE: Performed by: INTERNAL MEDICINE

## 2021-01-11 PROCEDURE — 250N000011 HC RX IP 250 OP 636: Performed by: INTERNAL MEDICINE

## 2021-01-11 PROCEDURE — 88313 SPECIAL STAINS GROUP 2: CPT | Mod: TC | Performed by: INTERNAL MEDICINE

## 2021-01-11 PROCEDURE — 999N001017 HC STATISTIC GLUCOSE BY METER IP

## 2021-01-11 PROCEDURE — 710N000012 HC RECOVERY PHASE 2, PER MINUTE: Performed by: INTERNAL MEDICINE

## 2021-01-11 PROCEDURE — 250N000011 HC RX IP 250 OP 636: Performed by: NURSE ANESTHETIST, CERTIFIED REGISTERED

## 2021-01-11 RX ORDER — NALOXONE HYDROCHLORIDE 0.4 MG/ML
0.4 INJECTION, SOLUTION INTRAMUSCULAR; INTRAVENOUS; SUBCUTANEOUS
Status: DISCONTINUED | OUTPATIENT
Start: 2021-01-11 | End: 2021-01-11 | Stop reason: HOSPADM

## 2021-01-11 RX ORDER — SODIUM CHLORIDE, SODIUM LACTATE, POTASSIUM CHLORIDE, CALCIUM CHLORIDE 600; 310; 30; 20 MG/100ML; MG/100ML; MG/100ML; MG/100ML
INJECTION, SOLUTION INTRAVENOUS CONTINUOUS
Status: DISCONTINUED | OUTPATIENT
Start: 2021-01-11 | End: 2021-01-11 | Stop reason: HOSPADM

## 2021-01-11 RX ORDER — PROPOFOL 10 MG/ML
INJECTION, EMULSION INTRAVENOUS CONTINUOUS PRN
Status: DISCONTINUED | OUTPATIENT
Start: 2021-01-11 | End: 2021-01-11

## 2021-01-11 RX ORDER — LIDOCAINE 40 MG/G
CREAM TOPICAL
Status: DISCONTINUED | OUTPATIENT
Start: 2021-01-11 | End: 2021-01-11 | Stop reason: HOSPADM

## 2021-01-11 RX ORDER — LIDOCAINE HYDROCHLORIDE 10 MG/ML
INJECTION, SOLUTION INFILTRATION; PERINEURAL PRN
Status: DISCONTINUED | OUTPATIENT
Start: 2021-01-11 | End: 2021-01-11

## 2021-01-11 RX ORDER — NALOXONE HYDROCHLORIDE 0.4 MG/ML
0.2 INJECTION, SOLUTION INTRAMUSCULAR; INTRAVENOUS; SUBCUTANEOUS
Status: DISCONTINUED | OUTPATIENT
Start: 2021-01-11 | End: 2021-01-11 | Stop reason: HOSPADM

## 2021-01-11 RX ORDER — FLUMAZENIL 0.1 MG/ML
0.2 INJECTION, SOLUTION INTRAVENOUS
Status: DISCONTINUED | OUTPATIENT
Start: 2021-01-11 | End: 2021-01-11 | Stop reason: HOSPADM

## 2021-01-11 RX ORDER — NALOXONE HYDROCHLORIDE 0.4 MG/ML
0.4 INJECTION, SOLUTION INTRAMUSCULAR; INTRAVENOUS; SUBCUTANEOUS
Status: DISCONTINUED | OUTPATIENT
Start: 2021-01-11 | End: 2021-01-11

## 2021-01-11 RX ORDER — NALOXONE HYDROCHLORIDE 0.4 MG/ML
0.2 INJECTION, SOLUTION INTRAMUSCULAR; INTRAVENOUS; SUBCUTANEOUS
Status: DISCONTINUED | OUTPATIENT
Start: 2021-01-11 | End: 2021-01-11

## 2021-01-11 RX ORDER — ONDANSETRON 2 MG/ML
4 INJECTION INTRAMUSCULAR; INTRAVENOUS EVERY 30 MIN PRN
Status: DISCONTINUED | OUTPATIENT
Start: 2021-01-11 | End: 2021-01-11 | Stop reason: HOSPADM

## 2021-01-11 RX ORDER — FENTANYL CITRATE 50 UG/ML
25-50 INJECTION, SOLUTION INTRAMUSCULAR; INTRAVENOUS
Status: DISCONTINUED | OUTPATIENT
Start: 2021-01-11 | End: 2021-01-11 | Stop reason: HOSPADM

## 2021-01-11 RX ORDER — CIPROFLOXACIN 2 MG/ML
400 INJECTION, SOLUTION INTRAVENOUS ONCE
Status: DISCONTINUED | OUTPATIENT
Start: 2021-01-11 | End: 2021-01-11

## 2021-01-11 RX ORDER — FENTANYL CITRATE 50 UG/ML
INJECTION, SOLUTION INTRAMUSCULAR; INTRAVENOUS PRN
Status: DISCONTINUED | OUTPATIENT
Start: 2021-01-11 | End: 2021-01-11

## 2021-01-11 RX ORDER — PROPOFOL 10 MG/ML
INJECTION, EMULSION INTRAVENOUS PRN
Status: DISCONTINUED | OUTPATIENT
Start: 2021-01-11 | End: 2021-01-11

## 2021-01-11 RX ORDER — ONDANSETRON 4 MG/1
4 TABLET, ORALLY DISINTEGRATING ORAL EVERY 30 MIN PRN
Status: DISCONTINUED | OUTPATIENT
Start: 2021-01-11 | End: 2021-01-11 | Stop reason: HOSPADM

## 2021-01-11 RX ORDER — MEPERIDINE HYDROCHLORIDE 25 MG/ML
12.5 INJECTION INTRAMUSCULAR; INTRAVENOUS; SUBCUTANEOUS
Status: DISCONTINUED | OUTPATIENT
Start: 2021-01-11 | End: 2021-01-11 | Stop reason: HOSPADM

## 2021-01-11 RX ORDER — KETAMINE HYDROCHLORIDE 10 MG/ML
INJECTION, SOLUTION INTRAMUSCULAR; INTRAVENOUS PRN
Status: DISCONTINUED | OUTPATIENT
Start: 2021-01-11 | End: 2021-01-11

## 2021-01-11 RX ORDER — ONDANSETRON 2 MG/ML
INJECTION INTRAMUSCULAR; INTRAVENOUS PRN
Status: DISCONTINUED | OUTPATIENT
Start: 2021-01-11 | End: 2021-01-11

## 2021-01-11 RX ORDER — LABETALOL HYDROCHLORIDE 5 MG/ML
10 INJECTION, SOLUTION INTRAVENOUS
Status: DISCONTINUED | OUTPATIENT
Start: 2021-01-11 | End: 2021-01-11 | Stop reason: HOSPADM

## 2021-01-11 RX ORDER — CIPROFLOXACIN 2 MG/ML
400 INJECTION, SOLUTION INTRAVENOUS ONCE
Status: COMPLETED | OUTPATIENT
Start: 2021-01-11 | End: 2021-01-11

## 2021-01-11 RX ADMIN — CIPROFLOXACIN 400 MG: 2 INJECTION INTRAVENOUS at 13:45

## 2021-01-11 RX ADMIN — LIDOCAINE HYDROCHLORIDE 20 MG: 10 INJECTION, SOLUTION INFILTRATION; PERINEURAL at 13:53

## 2021-01-11 RX ADMIN — SODIUM CHLORIDE, POTASSIUM CHLORIDE, SODIUM LACTATE AND CALCIUM CHLORIDE: 600; 310; 30; 20 INJECTION, SOLUTION INTRAVENOUS at 13:45

## 2021-01-11 RX ADMIN — FENTANYL CITRATE 50 MCG: 50 INJECTION, SOLUTION INTRAMUSCULAR; INTRAVENOUS at 13:50

## 2021-01-11 RX ADMIN — PROPOFOL 20 MG: 10 INJECTION, EMULSION INTRAVENOUS at 13:53

## 2021-01-11 RX ADMIN — ONDANSETRON HYDROCHLORIDE 4 MG: 2 INJECTION, SOLUTION INTRAVENOUS at 13:55

## 2021-01-11 RX ADMIN — KETAMINE HYDROCHLORIDE 20 MG: 10 INJECTION, SOLUTION INTRAMUSCULAR; INTRAVENOUS at 13:53

## 2021-01-11 RX ADMIN — PROPOFOL 75 MCG/KG/MIN: 10 INJECTION, EMULSION INTRAVENOUS at 13:53

## 2021-01-11 RX ADMIN — MIDAZOLAM 2 MG: 1 INJECTION INTRAMUSCULAR; INTRAVENOUS at 13:46

## 2021-01-11 ASSESSMENT — COPD QUESTIONNAIRES: COPD: 0

## 2021-01-11 ASSESSMENT — ENCOUNTER SYMPTOMS
STRIDOR: 0
SEIZURES: 0
DYSRHYTHMIAS: 0

## 2021-01-11 ASSESSMENT — LIFESTYLE VARIABLES: TOBACCO_USE: 0

## 2021-01-11 ASSESSMENT — MIFFLIN-ST. JEOR: SCORE: 1740.59

## 2021-01-11 NOTE — ANESTHESIA PREPROCEDURE EVALUATION
Anesthesia Pre-Procedure Evaluation    Patient: Lars Coleman   MRN: [id number] : 1950          Preoperative Diagnosis: Pancreatic cyst [K86.2]    Procedure(s):  ESOPHAGOGASTRODUODENOSCOPY, WITH FINE NEEDLE ASPIRATION BIOPSY, WITH ENDOSCOPIC ULTRASOUND GUIDANCE    Past Medical History:   Diagnosis Date     Kidney stone      Mixed hyperlipidemia      Pancreatic disease      Pancreatic lesion 2020    Noted in ED, needs follow up.     Squamous cell carcinoma of skin of face      Type 2 diabetes mellitus      Past Surgical History:   Procedure Laterality Date     APPENDECTOMY       VASECTOMY       Anesthesia Evaluation     . Pt has had prior anesthetic. Type: General    No history of anesthetic complications          ROS/MED HX    ENT/Pulmonary:  - neg pulmonary ROS    (-) tobacco use, asthma, COPD, CONRAD risk factors and recent URI   Neurologic:     (+)other neuro tremor   (-) seizures and CVA   Cardiovascular:  - neg cardiovascular ROS   (+) ----. : . . . :. . Previous cardiac testing date:results:date: results:ECG reviewed date: results:NSR date: results:         (-) hypertension, CAD, arrhythmias and valvular problems/murmurs   METS/Exercise Tolerance:     Hematologic: Comments: Lab Test        01/07/21     11/14/20     07/22/15                       1404          2239          0929          WBC          5.5          9.8          4.6           HGB          15.4         15.7         16.5          MCV          90.5         91           88.6          PLT          167          214          162            Lab Test        11/14/20     10/30/20     07/07/20                       2239                                              NA           136          143.4        141.2         POTASSIUM    4.1          5.56*        4.39          CHLORIDE     104          106.6        100.1         CO2          26           25.6         31.7          BUN          18           17  17.0    15  15.8     CR           0.68          1.00         0.95          ANIONGAP     6             --           --           EDWAR          9.3          10.1         9.8           GLC          281*         153*         205*         - neg hematologic  ROS       Musculoskeletal:  - neg musculoskeletal ROS       GI/Hepatic:  - neg GI/hepatic ROS       Renal/Genitourinary:     (+) Nephrolithiasis ,    (-) renal disease   Endo:     (+) type II DM Using insulin - not using insulin pump .   (-) Type I DM, thyroid disease, chronic steroid usage and obesity   Psychiatric:  - neg psychiatric ROS       Infectious Disease:  - neg infectious disease ROS       Malignancy:   (+) Malignancy History of Skin          Other:    - neg other ROS                      Physical Exam  Normal systems: cardiovascular, pulmonary and dental    Airway   Mallampati: I  TM distance: >3 FB  Neck ROM: full    Dental     Cardiovascular   Rhythm and rate: regular and normal  (-) no friction rub, no systolic click and no murmur    Pulmonary    breath sounds clear to auscultation(-) no rhonchi, no decreased breath sounds, no wheezes, no rales and no stridor            Lab Results   Component Value Date    WBC 5.5 01/07/2021    HGB 15.4 01/07/2021    HCT 46.1 01/07/2021     01/07/2021    SED 46 01/06/2004     11/14/2020    POTASSIUM 4.1 11/14/2020    CHLORIDE 104 11/14/2020    CO2 26 11/14/2020    BUN 18 11/14/2020    CR 0.68 11/14/2020     (H) 11/14/2020    EDWAR 9.3 11/14/2020    ALBUMIN 4.0 11/14/2020    PROTTOTAL 8.3 11/14/2020    ALT 60 11/14/2020    AST 32 11/14/2020    ALKPHOS 104 11/14/2020    BILITOTAL 0.7 11/14/2020    LIPASE 540 (H) 11/14/2020    TSH 2.13 02/27/2018       Preop Vitals  BP Readings from Last 3 Encounters:   01/07/21 (!) 148/72   12/08/20 126/78   11/30/20 136/76    Pulse Readings from Last 3 Encounters:   01/07/21 90   12/08/20 86   11/30/20 101      Resp Readings from Last 3 Encounters:   01/07/21 20   11/14/20 20   10/30/20 20    SpO2 Readings from  "Last 3 Encounters:   12/08/20 96%   11/30/20 100%   11/15/20 96%      Temp Readings from Last 1 Encounters:   01/07/21 97.4  F (36.3  C)    Ht Readings from Last 1 Encounters:   01/07/21 1.803 m (5' 11\")      Wt Readings from Last 1 Encounters:   01/07/21 95.3 kg (210 lb)    Estimated body mass index is 29.29 kg/m  as calculated from the following:    Height as of this encounter: 1.803 m (5' 11\").    Weight as of this encounter: 95.3 kg (210 lb).       Anesthesia Plan      History & Physical Review  History and physical reviewed and following examination; no interval change.    ASA Status:  2 .    NPO Status:  > 8 hours    Plan for MAC Reason for MAC:  Procedure to face, neck, head or breast           Postoperative Care  Postoperative pain management:  IV analgesics.      Consents  Anesthetic plan, risks, benefits and alternatives discussed with:  Patient or representative and Patient..                 Deon Araya MD                    .  "

## 2021-01-11 NOTE — ANESTHESIA POSTPROCEDURE EVALUATION
Patient: Lars Coleman    Procedure(s):  ESOPHAGOGASTRODUODENOSCOPY, WITH FINE NEEDLE ASPIRATION BIOPSY, WITH ENDOSCOPIC ULTRASOUND GUIDANCE    Diagnosis:Pancreatic cyst [K86.2]  Diagnosis Additional Information: No value filed.    Anesthesia Type:  MAC    Note:  Anesthesia Post Evaluation    Patient location during evaluation: Phase 2  Patient participation: Able to fully participate in evaluation  Level of consciousness: awake and alert  Pain management: adequate  Airway patency: patent  Cardiovascular status: acceptable  Respiratory status: acceptable  Hydration status: acceptable  PONV: controlled             Last vitals:  Vitals:    01/11/21 1223   BP: (!) 156/78   Pulse: 109   Resp: 20   Temp: 97.7  F (36.5  C)   SpO2: 97%         Electronically Signed By: He El MD  January 11, 2021  2:44 PM

## 2021-01-11 NOTE — ANESTHESIA CARE TRANSFER NOTE
Patient: Lars Coleman    Procedure(s):  ESOPHAGOGASTRODUODENOSCOPY, WITH FINE NEEDLE ASPIRATION BIOPSY, WITH ENDOSCOPIC ULTRASOUND GUIDANCE    Diagnosis: Pancreatic cyst [K86.2]  Diagnosis Additional Information: No value filed.    Anesthesia Type:   MAC     Note:  Airway :Room Air  Patient transferred to:Phase II  Comments: VSS, patient awake and alert.Handoff Report: Identifed the Patient, Identified the Reponsible Provider, Reviewed the pertinent medical history, Discussed the surgical course, Reviewed Intra-OP anesthesia mangement and issues during anesthesia, Set expectations for post-procedure period and Allowed opportunity for questions and acknowledgement of understanding      Vitals: (Last set prior to Anesthesia Care Transfer)    CRNA VITALS  1/11/2021 1349 - 1/11/2021 1428      1/11/2021             NIBP:  116/51    Pulse:  97    NIBP Mean:  72    SpO2:  92 %                Electronically Signed By: NGA Gomez CRNA  January 11, 2021  2:28 PM

## 2021-01-12 DIAGNOSIS — E11.9 TYPE 2 DIABETES MELLITUS WITHOUT COMPLICATIONS (H): ICD-10-CM

## 2021-01-12 DIAGNOSIS — E11.9 TYPE 2 DIABETES MELLITUS WITHOUT COMPLICATION, WITH LONG-TERM CURRENT USE OF INSULIN (H): Primary | ICD-10-CM

## 2021-01-12 DIAGNOSIS — Z79.4 TYPE 2 DIABETES MELLITUS WITHOUT COMPLICATION, WITH LONG-TERM CURRENT USE OF INSULIN (H): Primary | ICD-10-CM

## 2021-01-12 RX ORDER — BLOOD SUGAR DIAGNOSTIC
STRIP MISCELLANEOUS
Qty: 200 STRIP | Refills: 3 | Status: SHIPPED | OUTPATIENT
Start: 2021-01-12 | End: 2022-02-08

## 2021-01-12 NOTE — TELEPHONE ENCOUNTER
Last OV 10/30/2020.    Pending Prescriptions:                       Disp   Refills    ONETOUCH ULTRA test strip [Pharmacy Med N*[address]*3            Sig: TEST BLOOD SUGAR 4 TIMES DAILY

## 2021-01-14 ENCOUNTER — OFFICE VISIT (OUTPATIENT)
Dept: FAMILY MEDICINE | Facility: CLINIC | Age: 71
End: 2021-01-14

## 2021-01-14 DIAGNOSIS — E11.9 TYPE 2 DIABETES MELLITUS WITHOUT COMPLICATIONS (H): Primary | ICD-10-CM

## 2021-01-14 LAB — COPATH REPORT: NORMAL

## 2021-01-14 NOTE — PROGRESS NOTES
SUBJECTIVE / OBJECTIVE:                                                Lars Coleman is a 70 year old male coming in for an initial visit for Medication Therapy Management.  He was referred to me by Dr. Pena.     REASON FOR MTM REFERRAL: Med [facility] services - diabetes focus     PATIENT CHIEF COMPLAINT/CONCERN: Patient would like to lose some weight and get better control of his diabetes    PAST MEDICAL HISTORY:   Past Medical History:   Diagnosis Date     Kidney stone      Mixed hyperlipidemia      Pancreatic disease      Pancreatic lesion 11/2020    Noted in ED, needs follow up.     Squamous cell carcinoma of skin of face      Type 2 diabetes mellitus          MEDICAL CONDITIONS REVIEWED:    DIABETES:      - Current Diabetes Medications:  Lantus - 43 units daily  Metformin ER 500mg taking 4 tablets daily  - Patient does not report problems taking medications regularly.  - Patient Questions/Concerns about medications:   None  - Discontinued Diabetes Medication History (Drug/Dose/Reason for Discontinuation):   none  - A1c Values:  Lab Results   Component Value Date    A1C 9.8 01/07/2021    A1C 9.5 10/30/2020    A1C 8.5 07/07/2020    A1C 7.0 11/07/2019    A1C 6.8 04/30/2019       - A1c Goal = <7%  - Self-Monitoring of Blood Glucose (SMBG): one time daily.  - Blood sugar ranges: Lately (over the past couple of weeks): 110-150 fasting  Previously, fasting closer to 180-200 mg/dL.  Postprandial previously 250-350 mg/dL  - Symptoms of low blood sugar? none.   - Frequency of hypoglycemia? never.  - Recent symptoms of high blood sugar? none.    - Cardiovascular Risk:  The 10-year ASCVD risk score (Almakelly GUPTA Jr., et al., 2013) is: 33.2%    Values used to calculate the score:      Age: 70 years      Sex: Male      Is Non- : No      Diabetic: Yes      Tobacco smoker: No      Systolic Blood Pressure: 125 mmHg      Is BP treated: No      HDL Cholesterol: 41 mg/dL      Total Cholesterol: 224 mg/dL  Recent  Labs   Lab Test 07/07/20 11/07/19   CHOL 224* 205*   HDL 41 49   LDL 96 109   TRIG 435* 237*   CHOLHDLRATIO 5 4     BP Readings from Last 3 Encounters:   01/11/21 125/82   01/07/21 (!) 148/72   12/08/20 126/78       - Renal Function Status/History:   GFR Estimate   Date Value Ref Range Status   01/11/2021 88 >60 mL/min/[1.73_m2] Final     Comment:     Non  GFR Calc  Starting 12/18/2018, serum creatinine based estimated GFR (eGFR) will be   calculated using the Chronic Kidney Disease Epidemiology Collaboration   (CKD-EPI) equation.     11/14/2020 >90 >60 mL/min/[1.73_m2] Final     Comment:     Non  GFR Calc  Starting 12/18/2018, serum creatinine based estimated GFR (eGFR) will be   calculated using the Chronic Kidney Disease Epidemiology Collaboration   (CKD-EPI) equation.     02/07/2019 89 > OR = 60 mL/min/1.73m2 Final   02/27/2018 91 > OR = 60 mL/min/1.73m2 Final   08/22/2017 90 > OR = 60 mL/min/1.73m2 Final     GFR Estimate If Black   Date Value Ref Range Status   01/11/2021 >90 >60 mL/min/[1.73_m2] Final     Comment:      GFR Calc  Starting 12/18/2018, serum creatinine based estimated GFR (eGFR) will be   calculated using the Chronic Kidney Disease Epidemiology Collaboration   (CKD-EPI) equation.     11/14/2020 >90 >60 mL/min/[1.73_m2] Final     Comment:      GFR Calc  Starting 12/18/2018, serum creatinine based estimated GFR (eGFR) will be   calculated using the Chronic Kidney Disease Epidemiology Collaboration   (CKD-EPI) equation.         - Primary prevention:              Aspirin - UNKNOWN              Statin - YES              ACE-I/ARB - NO              Last Diabetic Eye Exam - Unknown              Last Diabetic Foot Exam - Unknown   Immunizations: Seasonal Flu? UNKNOWN; Pneumonia Vaccine? UNKNOWN  - Diabetic Lifestyle:  Diabetic Diet: Discussed in detail today  Patient exercise regimen: Discussed in detail today  - Has patient previously met  with Diabetes Education?: UNKNOWN  - Additional Information:   Patient feels that if he can get back to having a more active lifestyle with a more controlled diet, he will be able to improve and maintain better diabetes control.  In 2019, when his blood sugars and A1c were well-controlled, he reports that he and his wife were working in [employer] and he was active and eating well.  His portion sizes were less and his carbohydrate intake was less.     Since receiving his most recent A1c result, he has begun testing his blood sugars more regularly and making better dietary choices.  He is still working on improving his activity/exercise as well.  So far, he has already improved his blood sugar trends.   Patient and I discussed other medication options that could be considered if needed, including GLP-1 agonist and SGLT-2 inhibitors.  We talked about pros & cons, risks, mechanism of actions, cost, etc.  Patient is interested in       Current Outpatient Medications   Medication Sig Dispense Refill     Acetylcarnitine HCl (ACETYL-L-CARNITINE HCL) POWD daily       Ascorbic Acid (VITAMIN C PO) Take by mouth daily       atorvastatin (LIPITOR) 20 MG tablet Take 0.5 tablets (10 mg) by mouth daily 45 tablet 1     Cholecalciferol (VITAMIN D PO) Take by mouth daily       coenzyme Q10 (CO-Q10) 30 MG capsule Take 1 capsule (30 mg) by mouth daily       Green Tea, Camillia sinensis, (GREEN TEA EXTRACT PO) Take by mouth daily       KRILL OIL PO Take 1 capsule by mouth daily       LANTUS SOLOSTAR 100 UNIT/ML soln INJECT 43 UNITS SUBQ AT BEDTIME. 45 mL 0     LITHIUM PO Take 1,000 mg by mouth daily       MAGNESIUM PO Take by mouth daily       metFORMIN (GLUCOPHAGE-XR) 500 MG 24 hr tablet Take 4 tablets (2,000 mg) by mouth daily (with dinner) (Patient taking differently: Take 2,000 mg by mouth every morning ) 360 tablet 1     MILK THISTLE PO Take 1 tablet by mouth daily       Nutritional Supplements (NUTRITIONAL SUPPLEMENT  PO) Cornitex, Cinsulin, TriSugar Shield, Nurish Green Energy, Amazing Grass Green Superfood       ONETOUCH ULTRA test strip TEST BLOOD SUGAR 4 TIMES DAILY 200 strip 3     TAURINE PO Take 1 tablet by mouth daily       TURMERIC PO Take 1 tablet by mouth daily       ULTICARE MINI 31G X 6 MM insulin pen needle USE 4 PEN NEEDLES DAILY OR AS DIRECTED.         Current labs include:  BP Readings from Last 3 Encounters:   01/11/21 125/82   01/07/21 (!) 148/72   12/08/20 126/78       There were no vitals taken for this visit.    Most Recent Immunizations   Administered Date(s) Administered     HEPA 07/08/2003     Influenza Vaccine IM > 6 months Valent IIV4 10/30/2020     Pneumo Conj 13-V (2010&after) 08/22/2017     Pneumococcal 23 valent 01/22/2016     TD (ADULT, 7+) 03/26/2001     TDAP Vaccine (Boostrix) 11/28/2012       ASSESSMENT / PLAN:                                                       DIABETES:  Assessment: Over the past year and a half, patient's A1c has been trending up.  Previously at goal of < 7% back in 2019 when patient had a more active lifestyle and better diet.  Patient appears to be motivated at this time to improve his lifestyle management of diabetes although he is also open to medication adjustments if needed, including dosage adjustments or additional medication.  He has already made significant improvements in his blood sugar trends.  We will continue to monitor his blood sugar trends and consider adding on a GLP-1 agonist once results are available from his recent endoscopy.  Status: Not at A1c goal  Drug Therapy Problems:  1) None  Plan:  1) Patient will continue to work on diet and exercise changes to manage his diabetes.  2) Once results of recent endoscopy are available, will review for any concerns to starting a GLP-1 agonist (consider Soliqua).  Following availability of results, will connect with patient via phone or in clinic within the next few weeks.  If patient is able to make progress on  controlling blood sugars and losing weight, may not need to start GLP-1 agonist therapy at this time.      I spent 1 hour with this patient today.  All changes were made via collaborative practice agreement with Pop Pena. A copy of the visit note was provided to the patient's primary care provider.    Stas Solano, PharmD  Medication Therapy Management Pharmacist  [facility]  Office Phone: [phone]

## 2021-01-21 ENCOUNTER — TRANSFERRED RECORDS (OUTPATIENT)
Dept: FAMILY MEDICINE | Facility: CLINIC | Age: 71
End: 2021-01-21

## 2021-02-25 ENCOUNTER — OFFICE VISIT (OUTPATIENT)
Dept: FAMILY MEDICINE | Facility: CLINIC | Age: 71
End: 2021-02-25

## 2021-02-25 DIAGNOSIS — E11.9 TYPE 2 DIABETES MELLITUS WITHOUT COMPLICATION, WITH LONG-TERM CURRENT USE OF INSULIN (H): Primary | ICD-10-CM

## 2021-02-25 DIAGNOSIS — Z79.4 TYPE 2 DIABETES MELLITUS WITHOUT COMPLICATION, WITH LONG-TERM CURRENT USE OF INSULIN (H): Primary | ICD-10-CM

## 2021-02-25 NOTE — PROGRESS NOTES
SUBJECTIVE/OBJECTIVE:                Lars Coleman is a 70 year old male coming in for a follow-up visit for Medication Management Services.  He was referred to me from Dr. Pena.     Chief Complaint: Follow up from MTM visit on 1/14/21.  Diabetes focus    DIABETES:  Current Medications:  Lantus 35 units daily  Metformin ER 500mg taking 4 tablets daily  We discussed the benefits and risks of each medication.  Patient Questions/Concerns:  none  Labs:  Lab Results   Component Value Date    A1C 9.8 01/07/2021    A1C 9.5 10/30/2020    A1C 8.5 07/07/2020    A1C 7.0 11/07/2019    A1C 6.8 04/30/2019     Patient-reported blood sugar readings (Fasting AM)  14-Jan 120   15-Jan 140   16-Jan 145   17-Jan 138   18-Jan 133   19-Jan 151   20-Jan 149   21-Jan 123   22-Jan 102   23-Jan 125   24-Jan 25-Jan 149   26-Jan 147   27-Jan 127   28-Jan 135   29-Jan 152   30-Jan 31-Jan 141   1-Feb    2-Feb 125   3-Feb 71   4-Feb 120   5-Feb    6-Feb 133   7-Feb    8-Feb 145   9-Feb 152   10-Feb 146   11-Feb 125   12-Feb 167   13-Feb 144   14-Feb 141   15-Feb 133   16-Feb 119   17-Feb 139   18-Feb 136   Average 134           Additional Information:  Patient and I discussed GLP-1 agonists in detail again.  Pros & cons and mechanism of action and dosing schedules.    Over the past several weeks, patient has been working hard at lifestyle modifications.  He has been trying to lose weight by reducing his carbohydrate intake and increasing his activity/exercise.  He would like to continue to focus on diet and exercise a bit more before adding on a new medication (GLP-1).  He has been titrating down on his insulin dose to help with weight loss, but I encouraged him to try and titrate so that most of the time his AM fasting blood sugar is between  mg/dL.    ASSESSMENT/PLAN:                DIABETES:  Assessment: His AM fasting blood sugars are likely much improved from what they were prior to his last A1c check.  Patient is making great  progress with healthy lifestyle modifications.  I advised patient to continue to titrate his Lantus dose so that his AM fasting blood sugars are more in the range of  mg/dL.  Patient would like to f/u in 1 month to see how his average blood sugar continues to improve.  If needed, after his next A1c check, if he is not at goal of < 7% or at least very close to that, patient would like to start a GLP-1 agonist.  Status: A1c not at goal.  Blood sugar trend improving  Drug Therapy Problems:  1) None  Plan:  1) Patient will continue to work on lifestyle modifications for diabetes control.  Specifically, he will be focusing on improving activity/exercise but also continue to work on dietary choices/portion sizes.  2) Due for labs early April.      I spent 30 minutes with this patient today.  All changes were made via collaborative practice agreement with Pop Pena. A copy of the visit note was provided to the patient's primary care provider.    Stas Solano, PharmD  Clinical Pharmacist  [facility]  General Clinic Phone: [phone]  Direct Office Phone: [phone]

## 2021-03-10 DIAGNOSIS — E11.9 TYPE 2 DIABETES MELLITUS WITHOUT COMPLICATION, WITH LONG-TERM CURRENT USE OF INSULIN (H): ICD-10-CM

## 2021-03-10 DIAGNOSIS — Z79.4 TYPE 2 DIABETES MELLITUS WITHOUT COMPLICATION, WITH LONG-TERM CURRENT USE OF INSULIN (H): ICD-10-CM

## 2021-03-10 RX ORDER — INSULIN GLARGINE 100 [IU]/ML
INJECTION, SOLUTION SUBCUTANEOUS
Qty: 45 ML | Refills: 0 | Status: SHIPPED | OUTPATIENT
Start: 2021-03-10 | End: 2021-03-30

## 2021-03-10 NOTE — TELEPHONE ENCOUNTER
Lars Coleman is requesting a refill of:    Pending Prescriptions:                       Disp   Refills    LANTUS SOLOSTAR 100 UNIT/ML soln [Pharmac*45 mL  0            Sig: INJECT 43 UNITS SUBQ AT BEDTIME.    Pt has OV on 3/30/21 with you and 3/25/21 with Clara Davidson

## 2021-03-25 ENCOUNTER — OFFICE VISIT (OUTPATIENT)
Dept: FAMILY MEDICINE | Facility: CLINIC | Age: 71
End: 2021-03-25

## 2021-03-25 DIAGNOSIS — Z79.4 TYPE 2 DIABETES MELLITUS WITHOUT COMPLICATION, WITH LONG-TERM CURRENT USE OF INSULIN (H): Primary | ICD-10-CM

## 2021-03-25 DIAGNOSIS — E11.9 TYPE 2 DIABETES MELLITUS WITHOUT COMPLICATION, WITH LONG-TERM CURRENT USE OF INSULIN (H): Primary | ICD-10-CM

## 2021-03-25 RX ORDER — SEMAGLUTIDE 1.34 MG/ML
INJECTION, SOLUTION SUBCUTANEOUS
Qty: 1.5 ML | Refills: 0 | Status: SHIPPED | OUTPATIENT
Start: 2021-03-25 | End: 2021-03-29

## 2021-03-25 NOTE — PROGRESS NOTES
SUBJECTIVE/OBJECTIVE:                Lars Coleman is a 70 year old male coming in for a follow-up visit for Medication Management Services.  He was referred to me from Dr. Pena.     Chief Complaint: Follow up from MTM visit on 2/25/20.  Diabetes focus    DIABETES:  Current Medications:  Lantus 35 units daily  Metformin ER 500mg taking 4 tablets daily  We discussed the benefits and risks of each medication.  Patient Questions/Concerns:  Patient is interested in changing from Lantus to a GLP-1 agonist.  We talked about Ozempic in detail and brieftly discussed Trulicity as well.  Labs:  Lab Results   Component Value Date    A1C 9.8 01/07/2021    A1C 9.5 10/30/2020    A1C 8.5 07/07/2020    A1C 7.0 11/07/2019    A1C 6.8 04/30/2019     Patient did not provide an updated blood sugar log, however, he reports his patterns/averages have not changed.  No hypoglycemia.  Occasional readings > 180 but generally closer to being in range.  Additional Information:  none    ASSESSMENT/PLAN:                DIABETES:  Assessment: Blood sugars remaining stable per patient.  Patient seeing Dr. Pena next week for labs.  Would like to change to a weekly GLP-1 injection vs using daily insulin.  We talked about Ozempic and he is interested in trying that.  As the GLP-1 starts to take effect, he will plan on reducing his Lantus dose and eventually stopping Lantus when he runs out of his current supply to see if his blood sugars will be controlled with the combination of Metformin + GLP-1.    Status: Not at A1c goal.  Blood sugar patterns improved and stable.  Drug Therapy Problems:  1) More effective drug - GLP-1  Plan:  1) Start Ozempic 0.25mg weekly x 4 weeks then increase to 0.5mg weekly thereafter.  Patient will adjust insulin dose down and eventually stop when he runs out of his current supply.  If he experiences any adverse reactions or low blood sugars, he will contact me.  2) Patient meeting with Dr. Pena next week for labs  2)  Patient to schedule an appointment to meet with me in clinic in about 4-6 weeks, before he leaves for the summer to work at [employer].      I spent 30 minutes with this patient today.  All changes were made via collaborative practice agreement with Pop Pena. A copy of the visit note was provided to the patient's primary care provider.    Stas Solano, PharmD  Clinical Pharmacist  [facility] Phone: [phone]  Direct Office Phone: [phone]

## 2021-03-29 ENCOUNTER — TELEPHONE (OUTPATIENT)
Dept: FAMILY MEDICINE | Facility: CLINIC | Age: 71
End: 2021-03-29

## 2021-03-29 DIAGNOSIS — E11.9 TYPE 2 DIABETES MELLITUS WITHOUT COMPLICATION, WITH LONG-TERM CURRENT USE OF INSULIN (H): ICD-10-CM

## 2021-03-29 DIAGNOSIS — Z79.4 TYPE 2 DIABETES MELLITUS WITHOUT COMPLICATION, WITH LONG-TERM CURRENT USE OF INSULIN (H): ICD-10-CM

## 2021-03-29 RX ORDER — SEMAGLUTIDE 1.34 MG/ML
INJECTION, SOLUTION SUBCUTANEOUS
Qty: 1.5 ML | Refills: 0 | Status: SHIPPED | OUTPATIENT
Start: 2021-03-29 | End: 2021-05-05

## 2021-03-29 NOTE — TELEPHONE ENCOUNTER
Can you reprint script sent in on 03/25/21 by Stas. It says local print, however I do not know where it printed to.    Lars Coleman is requesting a refill of:    Pending Prescriptions:                       Disp   Refills    Semaglutide(0.25 or 0.5MG/DOS) (OZEMPIC (*1.5 mL 0            Sig: Inject 0.25mg subcutaneous weekly x 4 weeks           then  Increase to 0.5mg weekly thereafter

## 2021-03-30 ENCOUNTER — OFFICE VISIT (OUTPATIENT)
Dept: FAMILY MEDICINE | Facility: CLINIC | Age: 71
End: 2021-03-30

## 2021-03-30 VITALS
DIASTOLIC BLOOD PRESSURE: 62 MMHG | TEMPERATURE: 97 F | HEIGHT: 71 IN | HEART RATE: 88 BPM | WEIGHT: 212.8 LBS | SYSTOLIC BLOOD PRESSURE: 138 MMHG | RESPIRATION RATE: 20 BRPM | BODY MASS INDEX: 29.79 KG/M2

## 2021-03-30 DIAGNOSIS — Z71.89 ACP (ADVANCE CARE PLANNING): ICD-10-CM

## 2021-03-30 DIAGNOSIS — E11.9 TYPE 2 DIABETES MELLITUS WITHOUT COMPLICATION, WITH LONG-TERM CURRENT USE OF INSULIN (H): Primary | ICD-10-CM

## 2021-03-30 DIAGNOSIS — E78.2 MIXED HYPERLIPIDEMIA: ICD-10-CM

## 2021-03-30 DIAGNOSIS — K86.9 PANCREATIC LESION: ICD-10-CM

## 2021-03-30 DIAGNOSIS — K86.2 PANCREATIC CYST: ICD-10-CM

## 2021-03-30 DIAGNOSIS — Z79.4 TYPE 2 DIABETES MELLITUS WITHOUT COMPLICATION, WITH LONG-TERM CURRENT USE OF INSULIN (H): Primary | ICD-10-CM

## 2021-03-30 LAB
ALBUMIN SERPL-MCNC: 4.6 G/DL (ref 3.6–5.1)
ALBUMIN/GLOB SERPL: 1.6 {RATIO} (ref 1–2.5)
ALP SERPL-CCNC: 69 U/L (ref 33–130)
ALT 1742-6: 23 U/L (ref 0–32)
AST 1920-8: 21 U/L (ref 0–35)
BILIRUB SERPL-MCNC: 0.7 MG/DL (ref 0.2–1.2)
BUN SERPL-MCNC: 14 MG/DL (ref 7–25)
BUN/CREATININE RATIO: 16.1 (ref 6–22)
CALCIUM SERPL-MCNC: 10.3 MG/DL (ref 8.6–10.3)
CHLORIDE SERPLBLD-SCNC: 103 MMOL/L (ref 98–110)
CHOLEST SERPL-MCNC: 163 MG/DL (ref 0–199)
CHOLEST/HDLC SERPL: 4 {RATIO} (ref 0–5)
CO2 SERPL-SCNC: 31.5 MMOL/L (ref 20–32)
CREAT SERPL-MCNC: 0.87 MG/DL (ref 0.6–1.3)
GLOBULIN, CALCULATED - QUEST: 2.8 (ref 1.9–3.7)
GLUCOSE SERPL-MCNC: 235 MG/DL (ref 60–99)
HBA1C MFR BLD: 8 % (ref 4–7)
HDLC SERPL-MCNC: 39 MG/DL (ref 40–150)
LDLC SERPL CALC-MCNC: 69 MG/DL (ref 0–130)
POTASSIUM SERPL-SCNC: 5.31 MMOL/L (ref 3.5–5.3)
PROT SERPL-MCNC: 7.4 G/DL (ref 6.1–8.1)
SODIUM SERPL-SCNC: 141.5 MMOL/L (ref 135–146)
TRIGL SERPL-MCNC: 273 MG/DL (ref 0–149)

## 2021-03-30 PROCEDURE — 36415 COLL VENOUS BLD VENIPUNCTURE: CPT | Performed by: FAMILY MEDICINE

## 2021-03-30 PROCEDURE — 80053 COMPREHEN METABOLIC PANEL: CPT | Performed by: FAMILY MEDICINE

## 2021-03-30 PROCEDURE — 80061 LIPID PANEL: CPT | Performed by: FAMILY MEDICINE

## 2021-03-30 PROCEDURE — 83036 HEMOGLOBIN GLYCOSYLATED A1C: CPT | Performed by: FAMILY MEDICINE

## 2021-03-30 PROCEDURE — 99214 OFFICE O/P EST MOD 30 MIN: CPT | Performed by: FAMILY MEDICINE

## 2021-03-30 ASSESSMENT — MIFFLIN-ST. JEOR: SCORE: 1752.38

## 2021-03-30 NOTE — PROGRESS NOTES
"    Assessment & Plan     Type 2 diabetes mellitus without complication, with long-term current use of insulin (H)  Improved nicely-has not started ozempic yet due to pharmacy issue- did have a low in middle of night-? somogyi effect-need to montitor closely-recommend consider checking in middle of night, will d/w MTM as well  - Hemoglobin A1c (BFP)  - VENOUS COLLECTION  - insulin glargine (LANTUS SOLOSTAR) 100 UNIT/ML pen  Dispense: 45 mL; Refill: 1    Mixed hyperlipidemia  Control uncertain, continue current medications at current doses     Pancreatic cyst  Followed by GI-plans recheck next month      Review of external notes as documented elsewhere in note  Review of the result(s) of each unique test - labs         BMI:   Estimated body mass index is 29.68 kg/m  as calculated from the following:    Height as of this encounter: 1.803 m (5' 11\").    Weight as of this encounter: 96.5 kg (212 lb 12.8 oz).   Weight management plan: Discussed healthy diet and exercise guidelines    FUTURE APPOINTMENTS:       - Follow-up visit in 3-6 mo depending on if at [facility] all summer  Work on weight loss  Regular exercise    No follow-ups on file.    Pop Pena MD  [facility]    Sean Sousa is a 70 year old who presents for the following health issues     HPI     Diabetes Zrxiro-uh-dnuv be started on Ozempic 0.25 but hasn't gotten yet due to pharmacy back order, -plan to wean off insulin as sugars improve-currently 43 units , metformin to continue-see MTM notes-reviewed    How often are you checking your blood sugar? Three times daily  Blood sugar testing frequency justification:  Adjustment of medication(s)  What time of day are you checking your blood sugars (select all that apply)?  Before meals am below 130,  Have you had any blood sugars above 200?  Yes on and off  Have you had any blood sugars below 70?  Yes, had a 61 in middle of night- felt bad    What symptoms do you notice when " "your blood sugar is low?  Shaky and Weak    What concerns do you have today about your diabetes? Blood sugar is often over 200 and Low blood sugar     Do you have any of these symptoms? (Select all that apply)  No numbness or tingling in feet.  No redness, sores or blisters on feet.  No complaints of excessive thirst.  No reports of blurry vision.  No significant changes to weight.    Have you had a diabetic eye exam in the last 12 months? No        BP Readings from Last 2 Encounters:   03/30/21 138/62   01/11/21 125/82     Hemoglobin A1C (%)   Date Value   01/07/2021 9.8 (A)   10/30/2020 9.5 (A)     LDL Cholesterol Calculated (mg/dL (calc))   Date Value   02/07/2019 99   02/27/2018 76     LDL Cholesterol Direct (mg/dL)   Date Value   07/07/2020 96   11/07/2019 109               Hyperlipidemia Follow-Up      Are you regularly taking any medication or supplement to lower your cholesterol?   Yes- atorvastatin    Are you having muscle aches or other side effects that you think could be caused by your cholesterol lowering medication?  No    pancreatitis and cyst- reviewed [facility] notes 1/21/21plan for f/u in a month-no abd pain    How many servings of fruits and vegetables do you eat daily?  2-3    On average, how many sweetened beverages do you drink each day (Examples: soda, juice, sweet tea, etc.  Do NOT count diet or artificially sweetened beverages)?   1    How many days per week do you exercise enough to make your heart beat faster? 5    How many minutes a day do you exercise enough to make your heart beat faster? 20 - 29    How many days per week do you miss taking your medication? 0        Review of Systems   Constitutional, HEENT, cardiovascular, pulmonary, gi and gu systems are negative, except as otherwise noted.      Objective    /62 (BP Location: Right arm, Patient Position: Chair, Cuff Size: Adult Regular)   Pulse 88   Temp 97  F (36.1  C)   Resp 20   Ht 1.803 m (5' 11\")   Wt 96.5 kg (212 lb 12.8 " oz)   BMI 29.68 kg/m    Body mass index is 29.68 kg/m .  Physical Exam   GENERAL: healthy, alert and no distress  NECK: no adenopathy, no asymmetry, masses, or scars and thyroid normal to palpation  RESP: lungs clear to auscultation - no rales, rhonchi or wheezes  CV: regular rate and rhythm, normal S1 S2, no S3 or S4, no murmur, click or rub, no peripheral edema and peripheral pulses strong  ABDOMEN: soft, nontender, no hepatosplenomegaly, no masses and bowel sounds normal  MS: no gross musculoskeletal defects noted, no edema  PSYCH: mentation appears normal, affect normal/bright  Diabetic foot exam: normal DP and PT pulses, no trophic changes or ulcerative lesions and normal sensory exam    Results for orders placed or performed in visit on 03/30/21 (from the past 24 hour(s))   Hemoglobin A1c (BFP)   Result Value Ref Range    Hemoglobin A1C 8.0 (A) 4.0 - 7.0 %

## 2021-03-30 NOTE — Clinical Note
Stas- please see notes FYI- sounds like he may have had somogyi effect, can you check in with him regarding weaning insulin as ozempic takes effect-not sure he is clear on process    We can discuss Thursday

## 2021-03-30 NOTE — NURSING NOTE
Lars Coleman is here for a diabetic check and medication refill.    Questioned patient about current smoking habits.  Pt. has never smoked.  Body mass index is 29.68 kg/m .  PULSE regular  My Chart: active  CLASSIFICATION OF OVERWEIGHT AND OBESITY BY BMI                        Obesity Class           BMI(kg/m2)  Underweight                                    < 18.5  Normal                                         18.5-24.9  Overweight                                     25.0-29.9  OBESITY                     I                  30.0-34.9                             II                 35.0-39.9  EXTREME OBESITY             III                >40                            Patient's  BMI Body mass index is 29.68 kg/m .  http://hin.nhlbi.nih.gov/menuplanner/menu.cgi    Pre-visit planning  Immunizations - up to date  Colonoscopy - ordered  Mammogram -   Asthma -   PHQ9 -    ANYA-7 -    Hearing screen -is completed today

## 2021-05-02 DIAGNOSIS — E78.2 MIXED HYPERLIPIDEMIA: ICD-10-CM

## 2021-05-03 RX ORDER — ATORVASTATIN CALCIUM 20 MG/1
10 TABLET, FILM COATED ORAL DAILY
Qty: 45 TABLET | Refills: 1 | Status: SHIPPED | OUTPATIENT
Start: 2021-05-03 | End: 2021-10-19

## 2021-05-03 NOTE — TELEPHONE ENCOUNTER
Lars Coleman is requesting a refill of:    Pending Prescriptions:                       Disp   Refills    atorvastatin (LIPITOR) 20 MG tablet [Phar*45 tab*1            Sig: TAKE 0.5 TABLETS (10 MG) BY MOUTH DAILY    Recent Labs   Lab Test 03/30/21  1143 07/07/20   CHOL 163 224*   HDL 39* 41   LDL 69 96   TRIG 273* 435*   CHOLHDLRATIO 4 5

## 2021-05-05 DIAGNOSIS — E11.9 TYPE 2 DIABETES MELLITUS WITHOUT COMPLICATION, WITH LONG-TERM CURRENT USE OF INSULIN (H): ICD-10-CM

## 2021-05-05 DIAGNOSIS — Z79.4 TYPE 2 DIABETES MELLITUS WITHOUT COMPLICATION, WITH LONG-TERM CURRENT USE OF INSULIN (H): ICD-10-CM

## 2021-05-05 RX ORDER — SEMAGLUTIDE 1.34 MG/ML
INJECTION, SOLUTION SUBCUTANEOUS
Qty: 12 ML | Refills: 0 | Status: SHIPPED | OUTPATIENT
Start: 2021-05-05 | End: 2022-07-14

## 2021-05-05 NOTE — TELEPHONE ENCOUNTER
Received incoming refill request for  Pending Prescriptions:                       Disp   Refills    Semaglutide(0.25 or 0.5MG/DOS) (OZEMPIC (*                    Sig: INJECT 0.25MG SUBCUTANEOUS WEEKLY FOR 4 WEEKS           THAN INCREASE TO 0.5MG WEEKLY THEREAFTER.    Patient last had a refill of this medication on 3/29/21 and patient was last seen on 3/30/21 for his diabetic med check appt. Routing to Dr. Pena for approval or denial of refill request.

## 2021-06-30 DIAGNOSIS — E11.9 TYPE 2 DIABETES MELLITUS WITHOUT COMPLICATION, WITH LONG-TERM CURRENT USE OF INSULIN (H): ICD-10-CM

## 2021-06-30 DIAGNOSIS — Z79.4 TYPE 2 DIABETES MELLITUS WITHOUT COMPLICATION, WITH LONG-TERM CURRENT USE OF INSULIN (H): ICD-10-CM

## 2021-06-30 RX ORDER — METFORMIN HCL 500 MG
2000 TABLET, EXTENDED RELEASE 24 HR ORAL
Qty: 360 TABLET | Refills: 0 | Status: SHIPPED | OUTPATIENT
Start: 2021-06-30 | End: 2021-10-19

## 2021-06-30 NOTE — TELEPHONE ENCOUNTER
Last OV 3/30/2021 to follow up in 3-6 months, this was not filled at that visit, are you willing to give a 3 month supply?     Pending Prescriptions:                       Disp   Refills    metFORMIN (GLUCOPHAGE-XR) 500 MG 24 hr ta*360 ta*0            Sig: TAKE 4 TABLETS (2,000 MG) BY MOUTH DAILY (WITH           DINNER)

## 2021-09-04 ENCOUNTER — HEALTH MAINTENANCE LETTER (OUTPATIENT)
Age: 71
End: 2021-09-04

## 2021-10-04 DIAGNOSIS — Z79.4 TYPE 2 DIABETES MELLITUS WITHOUT COMPLICATION, WITH LONG-TERM CURRENT USE OF INSULIN (H): ICD-10-CM

## 2021-10-04 DIAGNOSIS — E11.9 TYPE 2 DIABETES MELLITUS WITHOUT COMPLICATION, WITH LONG-TERM CURRENT USE OF INSULIN (H): ICD-10-CM

## 2021-10-04 NOTE — TELEPHONE ENCOUNTER
Lras Coleman is requesting a refill of:    Pending Prescriptions:                       Disp   Refills    insulin glargine (LANTUS SOLOSTAR) 100 UN*45 mL  0            Sig: Inject 43 Units Subcutaneous At Bedtime    Pt has OV 11/1/21

## 2021-10-19 DIAGNOSIS — Z79.4 TYPE 2 DIABETES MELLITUS WITHOUT COMPLICATION, WITH LONG-TERM CURRENT USE OF INSULIN (H): ICD-10-CM

## 2021-10-19 DIAGNOSIS — E11.9 TYPE 2 DIABETES MELLITUS WITHOUT COMPLICATION, WITH LONG-TERM CURRENT USE OF INSULIN (H): ICD-10-CM

## 2021-10-19 DIAGNOSIS — E78.2 MIXED HYPERLIPIDEMIA: ICD-10-CM

## 2021-10-19 RX ORDER — ATORVASTATIN CALCIUM 20 MG/1
10 TABLET, FILM COATED ORAL DAILY
Qty: 15 TABLET | Refills: 0 | COMMUNITY
Start: 2021-10-19 | End: 2021-11-22

## 2021-10-19 RX ORDER — METFORMIN HCL 500 MG
2000 TABLET, EXTENDED RELEASE 24 HR ORAL
Qty: 120 TABLET | Refills: 0 | COMMUNITY
Start: 2021-10-19 | End: 2021-10-27

## 2021-10-19 NOTE — TELEPHONE ENCOUNTER
Lars Coleman is requesting a refill of:    Signed Prescriptions:                        Disp   Refills    metFORMIN (GLUCOPHAGE-XR) 500 MG 24 hr tab*120 ta*0        Sig: TAKE 4 TABLETS (2,000 MG) BY MOUTH DAILY (WITH           DINNER)  Authorizing Provider: DAVID JOSE  Ordering User: SIVAN BARFIELD    atorvastatin (LIPITOR) 20 MG tablet        15 tab*0        Sig: TAKE 0.5 TABLETS (10 MG) BY MOUTH DAILY  Authorizing Provider: DAVID JOSE  Ordering User: SIVAN BARFIELD

## 2021-10-23 DIAGNOSIS — E11.9 TYPE 2 DIABETES MELLITUS WITHOUT COMPLICATIONS (H): ICD-10-CM

## 2021-10-26 RX ORDER — FLURBIPROFEN SODIUM 0.3 MG/ML
SOLUTION/ DROPS OPHTHALMIC DAILY
Qty: 100 EACH | Refills: 1 | Status: SHIPPED | OUTPATIENT
Start: 2021-10-26 | End: 2021-10-27

## 2021-10-26 NOTE — TELEPHONE ENCOUNTER
Lars Coleman is requesting a refill of:    Pending Prescriptions:                       Disp   Refills    insulin pen needle (ULTICARE MINI) 31G X *100 ea*1            Sig: daily    Please close encounter if RX was sent. Thanks, Clara

## 2021-10-27 DIAGNOSIS — E11.9 TYPE 2 DIABETES MELLITUS WITHOUT COMPLICATIONS (H): ICD-10-CM

## 2021-10-27 DIAGNOSIS — Z79.4 TYPE 2 DIABETES MELLITUS WITHOUT COMPLICATION, WITH LONG-TERM CURRENT USE OF INSULIN (H): ICD-10-CM

## 2021-10-27 DIAGNOSIS — E11.9 TYPE 2 DIABETES MELLITUS WITHOUT COMPLICATION, WITH LONG-TERM CURRENT USE OF INSULIN (H): ICD-10-CM

## 2021-10-27 RX ORDER — FLURBIPROFEN SODIUM 0.3 MG/ML
SOLUTION/ DROPS OPHTHALMIC DAILY
Qty: 100 EACH | Refills: 1 | Status: SHIPPED | OUTPATIENT
Start: 2021-10-27 | End: 2022-05-03

## 2021-10-27 RX ORDER — METFORMIN HCL 500 MG
2000 TABLET, EXTENDED RELEASE 24 HR ORAL
Qty: 360 TABLET | Refills: 0 | Status: SHIPPED | OUTPATIENT
Start: 2021-10-27 | End: 2021-11-22

## 2021-10-27 NOTE — TELEPHONE ENCOUNTER
Lars Coleman is requesting a refill of:    Pending Prescriptions:                       Disp   Refills    insulin pen needle (ULTICARE MINI) 31G X *100 ea*1            Sig: daily    metFORMIN (GLUCOPHAGE-XR) 500 MG 24 hr ta*360 ta*0            Sig: Take 4 tablets (2,000 mg) by mouth daily (with           dinner)    Pt has OV

## 2021-10-30 ENCOUNTER — HEALTH MAINTENANCE LETTER (OUTPATIENT)
Age: 71
End: 2021-10-30

## 2021-11-17 DIAGNOSIS — E78.2 MIXED HYPERLIPIDEMIA: ICD-10-CM

## 2021-11-18 RX ORDER — ATORVASTATIN CALCIUM 20 MG/1
TABLET, FILM COATED ORAL
Qty: 15 TABLET | Refills: 0 | COMMUNITY
Start: 2021-11-18

## 2021-11-18 NOTE — TELEPHONE ENCOUNTER
Received incoming refill request for  Pending Prescriptions:                       Disp   Refills    atorvastatin (LIPITOR) 20 MG tablet [Phar*15 tab*0            Sig: TAKE 1/2 TABLET BY MOUTH EVERY DAY    Patient last had a refill of this medication on 10/19/21 for 15 tablets.patient already got an extension twice now and has cancelled and rescheduled his appt. He has an appt 11/22 where full refills will be sent in

## 2021-11-22 ENCOUNTER — OFFICE VISIT (OUTPATIENT)
Dept: FAMILY MEDICINE | Facility: CLINIC | Age: 71
End: 2021-11-22

## 2021-11-22 VITALS
HEIGHT: 71 IN | HEART RATE: 64 BPM | DIASTOLIC BLOOD PRESSURE: 72 MMHG | BODY MASS INDEX: 29.68 KG/M2 | WEIGHT: 212 LBS | RESPIRATION RATE: 20 BRPM | TEMPERATURE: 97.2 F | SYSTOLIC BLOOD PRESSURE: 138 MMHG

## 2021-11-22 DIAGNOSIS — E11.9 TYPE 2 DIABETES MELLITUS WITHOUT COMPLICATION, WITH LONG-TERM CURRENT USE OF INSULIN (H): Primary | ICD-10-CM

## 2021-11-22 DIAGNOSIS — K86.2 PANCREATIC CYST: ICD-10-CM

## 2021-11-22 DIAGNOSIS — Z79.4 TYPE 2 DIABETES MELLITUS WITHOUT COMPLICATION, WITH LONG-TERM CURRENT USE OF INSULIN (H): Primary | ICD-10-CM

## 2021-11-22 DIAGNOSIS — E78.2 MIXED HYPERLIPIDEMIA: ICD-10-CM

## 2021-11-22 DIAGNOSIS — Z12.5 SPECIAL SCREENING FOR MALIGNANT NEOPLASM OF PROSTATE: ICD-10-CM

## 2021-11-22 LAB
ALBUMIN (URINE) MG/L: 10
ALBUMIN SERPL-MCNC: 4.5 G/DL (ref 3.6–5.1)
ALBUMIN URINE MG/G CR: <30 MG/G CREATININE
ALBUMIN/GLOB SERPL: 1.7 {RATIO} (ref 1–2.5)
ALP SERPL-CCNC: 74 U/L (ref 33–130)
ALT 1742-6: 25 U/L (ref 0–32)
AST 1920-8: 23 U/L (ref 0–35)
BILIRUB SERPL-MCNC: 0.7 MG/DL (ref 0.2–1.2)
BUN SERPL-MCNC: 13 MG/DL (ref 7–25)
BUN/CREATININE RATIO: 14 (ref 6–22)
CALCIUM SERPL-MCNC: 9.7 MG/DL (ref 8.6–10.3)
CHLORIDE SERPLBLD-SCNC: 103.5 MMOL/L (ref 98–110)
CHOLEST SERPL-MCNC: 183 MG/DL (ref 0–199)
CHOLEST/HDLC SERPL: 4 {RATIO} (ref 0–5)
CO2 SERPL-SCNC: 29.5 MMOL/L (ref 20–32)
CREAT SERPL-MCNC: 0.93 MG/DL (ref 0.6–1.3)
CREATININE URINE MG/DL: 300 MG/DL
GLOBULIN, CALCULATED - QUEST: 2.6 (ref 1.9–3.7)
GLUCOSE SERPL-MCNC: 197 MG/DL (ref 60–99)
HBA1C MFR BLD: 8.5 % (ref 4–7)
HDLC SERPL-MCNC: 43 MG/DL (ref 40–150)
LDLC SERPL CALC-MCNC: 99 MG/DL (ref 0–130)
POTASSIUM SERPL-SCNC: 5.38 MMOL/L (ref 3.5–5.3)
PROT SERPL-MCNC: 7.1 G/DL (ref 6.1–8.1)
SODIUM SERPL-SCNC: 140.7 MMOL/L (ref 135–146)
TRIGL SERPL-MCNC: 207 MG/DL (ref 0–149)

## 2021-11-22 PROCEDURE — G0008 ADMIN INFLUENZA VIRUS VAC: HCPCS | Performed by: FAMILY MEDICINE

## 2021-11-22 PROCEDURE — 80061 LIPID PANEL: CPT | Performed by: FAMILY MEDICINE

## 2021-11-22 PROCEDURE — 82043 UR ALBUMIN QUANTITATIVE: CPT | Performed by: FAMILY MEDICINE

## 2021-11-22 PROCEDURE — 83036 HEMOGLOBIN GLYCOSYLATED A1C: CPT | Performed by: FAMILY MEDICINE

## 2021-11-22 PROCEDURE — 90662 IIV NO PRSV INCREASED AG IM: CPT | Performed by: FAMILY MEDICINE

## 2021-11-22 PROCEDURE — 82570 ASSAY OF URINE CREATININE: CPT | Performed by: FAMILY MEDICINE

## 2021-11-22 PROCEDURE — 36415 COLL VENOUS BLD VENIPUNCTURE: CPT | Performed by: FAMILY MEDICINE

## 2021-11-22 PROCEDURE — 99214 OFFICE O/P EST MOD 30 MIN: CPT | Mod: 25 | Performed by: FAMILY MEDICINE

## 2021-11-22 PROCEDURE — 80053 COMPREHEN METABOLIC PANEL: CPT | Performed by: FAMILY MEDICINE

## 2021-11-22 RX ORDER — SEMAGLUTIDE 1.34 MG/ML
INJECTION, SOLUTION SUBCUTANEOUS
Qty: 12 ML | Refills: 0 | Status: CANCELLED | OUTPATIENT
Start: 2021-11-22

## 2021-11-22 RX ORDER — ATORVASTATIN CALCIUM 20 MG/1
10 TABLET, FILM COATED ORAL DAILY
Qty: 90 TABLET | Refills: 1 | Status: SHIPPED | OUTPATIENT
Start: 2021-11-22 | End: 2022-05-03

## 2021-11-22 RX ORDER — METFORMIN HCL 500 MG
2000 TABLET, EXTENDED RELEASE 24 HR ORAL
Qty: 360 TABLET | Refills: 1 | Status: SHIPPED | OUTPATIENT
Start: 2021-11-22 | End: 2022-05-03

## 2021-11-22 RX ORDER — INSULIN GLARGINE 100 [IU]/ML
43 INJECTION, SOLUTION SUBCUTANEOUS AT BEDTIME
Qty: 45 ML | Refills: 0 | Status: SHIPPED | OUTPATIENT
Start: 2021-11-22 | End: 2022-05-03

## 2021-11-22 ASSESSMENT — MIFFLIN-ST. JEOR: SCORE: 1743.76

## 2021-11-22 NOTE — PROGRESS NOTES
"  Assessment & Plan     Type 2 diabetes mellitus without complication, with long-term current use of insulin (H)  suboptimal control, did not continue ozempic due to cost-will refer back to MTM for othe roptions  - HEMOGLOBIN A1C (BFP)  - VENOUS COLLECTION    Mixed hyperlipidemia  Control uncertain, continue current medications at current doses pending labs    Pancreatic cyst  stable      Review of external notes as documented elsewhere in note  Review of the result(s) of each unique test - labs  Ordering of each unique test  Prescription drug management         BMI:   Estimated body mass index is 29.57 kg/m  as calculated from the following:    Height as of this encounter: 1.803 m (5' 11\").    Weight as of this encounter: 96.2 kg (212 lb).       FUTURE APPOINTMENTS:       - Follow-up visit in 6 mo, sooner with MTM  Work on weight loss  Regular exercise    No follow-ups on file.    Pop Pena MD  [facility]    Sean Sousa is a 71 year old who presents for the following health issues     HPI     Diabetes Follow-up- metformin, Lantus 43 unit(s), tried ozempic but too expensive    How often are you checking your blood sugar? One time daily  What time of day are you checking your blood sugars (select all that apply)?  Before meals  130-160  Have you had any blood sugars above 200?  Yes   Have you had any blood sugars below 70?  No    What symptoms do you notice when your blood sugar is low?  Shaky    What concerns do you have today about your diabetes? None     Do you have any of these symptoms? (Select all that apply)  No numbness or tingling in feet.  No redness, sores or blisters on feet.  No complaints of excessive thirst.  No reports of blurry vision.  No significant changes to weight.      BP Readings from Last 2 Encounters:   11/22/21 138/72   03/30/21 138/62     Hemoglobin A1C (%)   Date Value   03/30/2021 8.0 (A)   01/07/2021 9.8 (A)     LDL Cholesterol Calculated (mg/dL " "(calc))   Date Value   02/07/2019 99   02/27/2018 76     LDL Cholesterol Direct (mg/dL)   Date Value   03/30/2021 69   07/07/2020 96         Hyperlipidemia Follow-Up      Are you regularly taking any medication or supplement to lower your cholesterol?   Yes- atorvastatin    Are you having muscle aches or other side effects that you think could be caused by your cholesterol lowering medication?  No      How many servings of fruits and vegetables do you eat daily?  2-3    On average, how many sweetened beverages do you drink each day (Examples: soda, juice, sweet tea, etc.  Do NOT count diet or artificially sweetened beverages)?   1    How many days per week do you exercise enough to make your heart beat faster? 3 or less    How many minutes a day do you exercise enough to make your heart beat faster? 20 - 29    How many days per week do you miss taking your medication? 0        Review of Systems   Constitutional, HEENT, cardiovascular, pulmonary, gi and gu systems are negative, except as otherwise noted.      Objective    /72 (BP Location: Left arm, Patient Position: Chair, Cuff Size: Adult Regular)   Pulse 64   Temp 97.2  F (36.2  C)   Resp 20   Ht 1.803 m (5' 11\")   Wt 96.2 kg (212 lb)   BMI 29.57 kg/m    Body mass index is 29.57 kg/m .  Physical Exam   GENERAL: healthy, alert and no distress  EYES: Eyes grossly normal to inspection, PERRL and conjunctivae and sclerae normal  HENT: ear canals and TM's normal, nose and mouth without ulcers or lesions  NECK: no adenopathy, no asymmetry, masses, or scars and thyroid normal to palpation  RESP: lungs clear to auscultation - no rales, rhonchi or wheezes  CV: regular rate and rhythm, normal S1 S2, no S3 or S4, no murmur, click or rub, no peripheral edema and peripheral pulses strong  ABDOMEN: soft, nontender, no hepatosplenomegaly, no masses and bowel sounds normal  MS: no gross musculoskeletal defects noted, no edema  PSYCH: mentation appears normal, affect " normal/bright  Diabetic foot exam: normal DP and PT pulses, no trophic changes or ulcerative lesions and normal sensory exam    Results for orders placed or performed in visit on 11/22/21 (from the past 24 hour(s))   HEMOGLOBIN A1C (BFP)   Result Value Ref Range    Hemoglobin A1C 8.5 (A) 4.0 - 7.0 %

## 2021-11-22 NOTE — NURSING NOTE
Lars Coleman is here for a diabetic check and medication refill.    Questioned patient about current smoking habits.  Pt. has never smoked.  Body mass index is 29.57 kg/m .  PULSE regular  My Chart: active  CLASSIFICATION OF OVERWEIGHT AND OBESITY BY BMI                        Obesity Class           BMI(kg/m2)  Underweight                                    < 18.5  Normal                                         18.5-24.9  Overweight                                     25.0-29.9  OBESITY                     I                  30.0-34.9                             II                 35.0-39.9  EXTREME OBESITY             III                >40                            Patient's  BMI Body mass index is 29.57 kg/m .  http://hin.nhlbi.nih.gov/menuplanner/menu.cgi    Pre-visit planning  Immunizations - up to date  Colonoscopy - is due and to be scheduled by patient for later completion  Mammogram -   Asthma -   PHQ9 -    ANYA-7 -    Hearing screen -

## 2021-11-23 LAB — ABBOTT PSA - QUEST: 0.88 NG/ML

## 2021-12-23 ENCOUNTER — OFFICE VISIT (OUTPATIENT)
Dept: FAMILY MEDICINE | Facility: CLINIC | Age: 71
End: 2021-12-23

## 2021-12-23 DIAGNOSIS — E11.9 TYPE 2 DIABETES MELLITUS WITHOUT COMPLICATION, WITH LONG-TERM CURRENT USE OF INSULIN (H): Primary | ICD-10-CM

## 2021-12-23 DIAGNOSIS — Z79.4 TYPE 2 DIABETES MELLITUS WITHOUT COMPLICATION, WITH LONG-TERM CURRENT USE OF INSULIN (H): Primary | ICD-10-CM

## 2021-12-23 NOTE — Clinical Note
Please review chart. Not making changes at this time due to figuring out coverage and new exercise regimen, however will be following and making changes based on what 2022 coverage looks like.

## 2021-12-23 NOTE — PROGRESS NOTES
SUBJECTIVE / OBJECTIVE:                                                Lars Coleman is a 71 year old male seen for an initial visit for Medication Therapy Management.  He was referred to me by Dr. Pop Pena.     REASON FOR MTM REFERRAL: diabetes medication recommendations     PATIENT CHIEF COMPLAINT/CONCERN: ozempic not covered well    PAST MEDICAL HISTORY: Reviewed in chart    MEDICAL CONDITIONS REVIEWED:    diabetes mellitus-type 2      Current Outpatient Medications   Medication Sig Dispense Refill     Acetylcarnitine HCl (ACETYL-L-CARNITINE HCL) POWD daily       Ascorbic Acid (VITAMIN C PO) Take by mouth daily       atorvastatin (LIPITOR) 20 MG tablet Take 0.5 tablets (10 mg) by mouth daily 90 tablet 1     Cholecalciferol (VITAMIN D PO) Take by mouth daily       coenzyme Q10 (CO-Q10) 30 MG capsule Take 1 capsule (30 mg) by mouth daily       Green Tea, Camillia sinensis, (GREEN TEA EXTRACT PO) Take by mouth daily       insulin glargine (LANTUS SOLOSTAR) 100 UNIT/ML pen Inject 43 Units Subcutaneous At Bedtime 45 mL 0     insulin pen needle (ULTICARE MINI) 31G X 6 MM miscellaneous daily 100 each 1     KRILL OIL PO Take 1 capsule by mouth daily       LITHIUM PO Take 1,000 mg by mouth daily       MAGNESIUM PO Take by mouth daily       metFORMIN (GLUCOPHAGE-XR) 500 MG 24 hr tablet Take 4 tablets (2,000 mg) by mouth daily (with dinner) 360 tablet 1     MILK THISTLE PO Take 1 tablet by mouth daily       Nutritional Supplements (NUTRITIONAL SUPPLEMENT PO) Cornitex, Cinsulin, TriSugar Shield, Nurish Green Energy, Amazing Grass Green Superfood       ONETOUCH ULTRA test strip TEST BLOOD SUGAR 4 TIMES DAILY 200 strip 3     Semaglutide,0.25 or 0.5MG/DOS, (OZEMPIC, 0.25 OR 0.5 MG/DOSE,) 2 MG/1.5ML SOPN INJECT 0.25MG SUBCUTANEOUS WEEKLY FOR 4 WEEKS THAN INCREASE TO 0.5MG WEEKLY THEREAFTER. 12 mL 0     TAURINE PO Take 1 tablet by mouth daily       TURMERIC PO Take 1 tablet by mouth daily         Current labs  include:  BP Readings from Last 3 Encounters:   11/22/21 138/72   03/30/21 138/62   01/11/21 125/82       There were no vitals taken for this visit.    Most Recent Immunizations   Administered Date(s) Administered     COVID-19,PF,Moderna 11/04/2021     HEPA 07/08/2003     Influenza Vaccine IM > 6 months Valent IIV4 (Alfuria,Fluzone) 10/30/2020     Influenza, Quad, High Dose, Pf, 65yr+ (Fluzone HD) 11/22/2021     Pneumo Conj 13-V (2010&after) 08/22/2017     Pneumococcal 23 valent 01/22/2016     TD (ADULT, 7+) 03/26/2001     TDAP Vaccine (Boostrix) 11/28/2012       ASSESSMENT / PLAN:                                                       diabetes:  Assessment: Diabetes not well controlled. Patient was prescribed ozempic and saw better numbers when using ozempic, but cost was a barrier. We will check 2022 formulary to see what options would be for GLP-1. Patient will be joining gym at the first of the year and is concerned with adjusting medications knowing his numbers will likely drop with his exercise increasing due to past results with activity.  Status: A1c not at goal.  Drug Therapy Problems:  1) GLP-1 Cost  2) Increase in insulin may be necessary. Will address after exercise regimen has been started.  Plan:  1) Check 2022 [facility] formulary to see what is preferred for GLP-1 and potentially add back into regimen if cost appropriate.  2) Address insulin dosing after exercise regimen has been initiated.       I spent 15 minutes with this patient today.  All changes were made via collaborative practice agreement with Pop Pena. A copy of the visit note was provided to the patient's primary care provider.    Moon Toney, PharmD  Medication Therapy Management Pharmacist  [facility]  Office Phone: [phone]

## 2022-02-06 DIAGNOSIS — E11.9 TYPE 2 DIABETES MELLITUS WITHOUT COMPLICATION, WITH LONG-TERM CURRENT USE OF INSULIN (H): ICD-10-CM

## 2022-02-06 DIAGNOSIS — Z79.4 TYPE 2 DIABETES MELLITUS WITHOUT COMPLICATION, WITH LONG-TERM CURRENT USE OF INSULIN (H): ICD-10-CM

## 2022-02-08 RX ORDER — BLOOD SUGAR DIAGNOSTIC
STRIP MISCELLANEOUS
Qty: 200 STRIP | Refills: 3 | Status: SHIPPED | OUTPATIENT
Start: 2022-02-08 | End: 2023-02-14

## 2022-02-08 NOTE — TELEPHONE ENCOUNTER
Pending Prescriptions:                       Disp   Refills    ONETOUCH ULTRA test strip [Pharmacy Med N*[address]*3            Sig: TEST BLOOD SUGAR 4 TIMES DAILY

## 2022-02-19 ENCOUNTER — HEALTH MAINTENANCE LETTER (OUTPATIENT)
Age: 72
End: 2022-02-19

## 2022-05-02 DIAGNOSIS — Z79.4 TYPE 2 DIABETES MELLITUS WITHOUT COMPLICATION, WITH LONG-TERM CURRENT USE OF INSULIN (H): ICD-10-CM

## 2022-05-02 DIAGNOSIS — E11.9 TYPE 2 DIABETES MELLITUS WITHOUT COMPLICATION, WITH LONG-TERM CURRENT USE OF INSULIN (H): ICD-10-CM

## 2022-05-03 ENCOUNTER — OFFICE VISIT (OUTPATIENT)
Dept: FAMILY MEDICINE | Facility: CLINIC | Age: 72
End: 2022-05-03

## 2022-05-03 VITALS
TEMPERATURE: 97.9 F | SYSTOLIC BLOOD PRESSURE: 138 MMHG | RESPIRATION RATE: 20 BRPM | DIASTOLIC BLOOD PRESSURE: 70 MMHG | HEIGHT: 71 IN | HEART RATE: 92 BPM | BODY MASS INDEX: 28.87 KG/M2 | WEIGHT: 206.2 LBS

## 2022-05-03 DIAGNOSIS — R55 SYNCOPE, UNSPECIFIED SYNCOPE TYPE: ICD-10-CM

## 2022-05-03 DIAGNOSIS — E78.2 MIXED HYPERLIPIDEMIA: ICD-10-CM

## 2022-05-03 DIAGNOSIS — M65.30 TRIGGER FINGER, ACQUIRED: ICD-10-CM

## 2022-05-03 DIAGNOSIS — E11.9 TYPE 2 DIABETES MELLITUS WITHOUT COMPLICATION, WITH LONG-TERM CURRENT USE OF INSULIN (H): Primary | ICD-10-CM

## 2022-05-03 DIAGNOSIS — Z12.11 SCREENING FOR COLON CANCER: ICD-10-CM

## 2022-05-03 DIAGNOSIS — Z79.4 TYPE 2 DIABETES MELLITUS WITHOUT COMPLICATION, WITH LONG-TERM CURRENT USE OF INSULIN (H): Primary | ICD-10-CM

## 2022-05-03 DIAGNOSIS — B35.6 TINEA CRURIS: ICD-10-CM

## 2022-05-03 LAB
ALBUMIN SERPL-MCNC: 4.6 G/DL (ref 3.6–5.1)
ALBUMIN/GLOB SERPL: 1.4 {RATIO} (ref 1–2.5)
ALP SERPL-CCNC: 86 U/L (ref 33–130)
ALT 1742-6: 26 U/L (ref 0–32)
AST 1920-8: 24 U/L (ref 0–35)
BILIRUB SERPL-MCNC: 0.9 MG/DL (ref 0.2–1.2)
BUN SERPL-MCNC: 16 MG/DL (ref 7–25)
BUN/CREATININE RATIO: 16.8 (ref 6–22)
CALCIUM SERPL-MCNC: 9.5 MG/DL (ref 8.6–10.3)
CHLORIDE SERPLBLD-SCNC: 100.3 MMOL/L (ref 98–110)
CHOLEST SERPL-MCNC: 161 MG/DL (ref 0–199)
CHOLEST/HDLC SERPL: 4 {RATIO} (ref 0–5)
CREAT SERPL-MCNC: 0.95 MG/DL (ref 0.6–1.3)
GLOBULIN, CALCULATED - QUEST: 3.2 (ref 1.9–3.7)
GLUCOSE SERPL-MCNC: 199 MG/DL (ref 60–99)
HBA1C MFR BLD: 8.9 % (ref 4–7)
HDLC SERPL-MCNC: 39 MG/DL (ref 40–150)
LDLC SERPL CALC-MCNC: 77 MG/DL (ref 0–130)
POTASSIUM SERPL-SCNC: 4.11 MMOL/L (ref 3.5–5.3)
PROT SERPL-MCNC: 7.8 G/DL (ref 6.1–8.1)
SODIUM SERPL-SCNC: 137.3 MMOL/L (ref 135–146)
TRIGL SERPL-MCNC: 223 MG/DL (ref 0–149)

## 2022-05-03 PROCEDURE — 99215 OFFICE O/P EST HI 40 MIN: CPT | Performed by: FAMILY MEDICINE

## 2022-05-03 PROCEDURE — 83036 HEMOGLOBIN GLYCOSYLATED A1C: CPT | Performed by: FAMILY MEDICINE

## 2022-05-03 PROCEDURE — 80053 COMPREHEN METABOLIC PANEL: CPT | Performed by: FAMILY MEDICINE

## 2022-05-03 PROCEDURE — 36415 COLL VENOUS BLD VENIPUNCTURE: CPT | Performed by: FAMILY MEDICINE

## 2022-05-03 PROCEDURE — 80061 LIPID PANEL: CPT | Performed by: FAMILY MEDICINE

## 2022-05-03 RX ORDER — ATORVASTATIN CALCIUM 20 MG/1
10 TABLET, FILM COATED ORAL DAILY
Qty: 90 TABLET | Refills: 1 | Status: SHIPPED | OUTPATIENT
Start: 2022-05-03 | End: 2022-11-10

## 2022-05-03 RX ORDER — FLURBIPROFEN SODIUM 0.3 MG/ML
SOLUTION/ DROPS OPHTHALMIC DAILY
Qty: 100 EACH | Refills: 1 | Status: SHIPPED | OUTPATIENT
Start: 2022-05-03 | End: 2022-11-10

## 2022-05-03 RX ORDER — INSULIN GLARGINE 100 [IU]/ML
43 INJECTION, SOLUTION SUBCUTANEOUS AT BEDTIME
COMMUNITY
Start: 2022-05-03

## 2022-05-03 RX ORDER — INSULIN GLARGINE 100 [IU]/ML
43 INJECTION, SOLUTION SUBCUTANEOUS AT BEDTIME
Qty: 45 ML | Refills: 0 | Status: SHIPPED | OUTPATIENT
Start: 2022-05-03 | End: 2022-07-14

## 2022-05-03 RX ORDER — SEMAGLUTIDE 1.34 MG/ML
0.5 INJECTION, SOLUTION SUBCUTANEOUS
Qty: 6 ML | Refills: 1 | Status: CANCELLED | OUTPATIENT
Start: 2022-05-03

## 2022-05-03 RX ORDER — METFORMIN HCL 500 MG
2000 TABLET, EXTENDED RELEASE 24 HR ORAL
Qty: 360 TABLET | Refills: 1 | Status: SHIPPED | OUTPATIENT
Start: 2022-05-03 | End: 2022-11-10

## 2022-05-03 NOTE — TELEPHONE ENCOUNTER
Lars Coleman is requesting a refill of:    Refused Prescriptions:                       Disp   Refills    LANTUS SOLOSTAR 100 UNIT/ML soln [Pharmacy*                Sig: INJECT 43 UNITS SUBCUTANEOUS AT BEDTIME  Refused By: SIVAN BARFIELD  Reason for Refusal: Duplicate

## 2022-05-03 NOTE — NURSING NOTE
Lars Coleman is here for a diabetic check and medication refill.    Questioned patient about current smoking habits.  Pt. has never smoked.  Body mass index is 28.76 kg/m .  PULSE regular  My Chart: active  CLASSIFICATION OF OVERWEIGHT AND OBESITY BY BMI                        Obesity Class           BMI(kg/m2)  Underweight                                    < 18.5  Normal                                         18.5-24.9  Overweight                                     25.0-29.9  OBESITY                     I                  30.0-34.9                             II                 35.0-39.9  EXTREME OBESITY             III                >40                            Patient's  BMI Body mass index is 28.76 kg/m .  http://hin.nhlbi.nih.gov/menuplanner/menu.cgi    Pre-visit planning  Immunizations - up to date  Colonoscopy - is due and ordered today  Mammogram -   Asthma -   PHQ9 -    ANYA-7 -

## 2022-05-03 NOTE — PROGRESS NOTES
Assessment & Plan     Type 2 diabetes mellitus without complication, with long-term current use of insulin (H)  Poor control-off ozempic, has lost some weight which is applauded    We discussed rohan for medication add and intensification-has met with MTM previously so we will arrange this again- continue medications for now but see MTM within 1-2 weeks  - HEMOGLOBIN A1C (BFP)  - VENOUS COLLECTION  - insulin glargine (LANTUS SOLOSTAR) 100 UNIT/ML pen  Dispense: 45 mL; Refill: 0  - metFORMIN (GLUCOPHAGE-XR) 500 MG 24 hr tablet  Dispense: 360 tablet; Refill: 1  - Comprehensive Metobolic Panel (BFP)  - insulin pen needle (ULTICARE MINI) 31G X 6 MM miscellaneous  Dispense: 100 each; Refill: 1  - blood glucose (NO BRAND SPECIFIED) lancets standard  Dispense: 300 each; Refill: 1    Mixed hyperlipidemia  Control uncertain, continue current medications at current doses pending labs  - atorvastatin (LIPITOR) 20 MG tablet  Dispense: 90 tablet; Refill: 1  - Comprehensive Metobolic Panel (BFP)  - Lipid Panel (BFP)    Trigger finger, acquired  Right 5th digit, discussed option to see Dr Almanza or ortho-he wishes to wait for now -if bothers him will schedule    Syncope, unspecified syncope type  Unclear episodes, broad differential, ? absence seizure vs other neurologic issue/syncopal issue-not progressive, exam WNL today, no other worrisome neuro symptoms -discussed need for further evaluation-can set up neurology eval but he understands to go immediately to ED if any acute worsening  - Adult Neurology  Referral    Tinea cruris  Pt with diffuse tinea cruruis, discussed diagnosis,  recommend antifungal cream, spray or powder OTC, use powder  to wick away moisture, avoid prolonged moisture exposure , f/u if not improving or worsening       Review of the result(s) of each unique test - labs  Ordering of each unique test  Prescription drug management  42 minutes spent on the date of the encounter doing chart review, review  of test results, interpretation of tests, patient visit and documentation        CONSULTATION/REFERRAL to MTM and neurology  FUTURE APPOINTMENTS:       - Follow-up visit in 3 mo with me  Work on weight loss  Regular exercise    No follow-ups on file.    Pop Pena MD  [facility]    Sean Sousa is a 71 year old who presents for the following health issues     HPI     Diabetes Follow-up-Metformin, insulin 43 unit(s)-not taking ozempic    How often are you checking your blood sugar? Two times daily  Blood sugar testing frequency justification:  Adjustment of medication(s)  What time of day are you checking your blood sugars (select all that apply)?  Before and after meals  Have you had any blood sugars above 200?  Yes after dinner  Have you had any blood sugars below 70?  No    What symptoms do you notice when your blood sugar is low?  Shaky and Dizzy    What concerns do you have today about your diabetes? Blood sugar is often over 200     Do you have any of these symptoms? (Select all that apply)  No numbness or tingling in feet.  No redness, sores or blisters on feet.  No complaints of excessive thirst.  No reports of blurry vision.  No significant changes to weight.    Have you had a diabetic eye exam in the last 12 months? No        BP Readings from Last 2 Encounters:   05/03/22 138/70   11/22/21 138/72     Hemoglobin A1C POCT (%)   Date Value   11/22/2021 8.5 (A)   03/30/2021 8.0 (A)     LDL Cholesterol Calculated (mg/dL (calc))   Date Value   02/07/2019 99   02/27/2018 76     LDL Cholesterol Direct (mg/dL)   Date Value   11/22/2021 99   03/30/2021 69               Hyperlipidemia Follow-Up      Are you regularly taking any medication or supplement to lower your cholesterol?   Yes- atorvastatin    Are you having muscle aches or other side effects that you think could be caused by your cholesterol lowering medication?  No      How many servings of fruits and vegetables do you  "eat daily?  2-3    On average, how many sweetened beverages do you drink each day (Examples: soda, juice, sweet tea, etc.  Do NOT count diet or artificially sweetened beverages)?   1    How many days per week do you exercise enough to make your heart beat faster? 4    How many minutes a day do you exercise enough to make your heart beat faster? 20 - 29    How many days per week do you miss taking your medication? 0    Pt notes right little finger triggering-does not bother him at this time, no pain    Pt has noted rare spells for a few weeks, momentary episodes where he sees vision changes, has fallen once-vision goes sideways but no LOC, legs went weak when fell, he also has had episodes where he feels like he \"startles awake\" but he is already awake-occurring during the day.  NO HA, NO vision loss, no speech issues, no weakness or numbness    Pt also notes rash in groin, itchy, red- has been applying hydrocortisone    Review of Systems   Constitutional, HEENT, cardiovascular, pulmonary, gi and gu systems are negative, except as otherwise noted.      Objective    /70 (BP Location: Right arm, Patient Position: Chair, Cuff Size: Adult Large)   Pulse 92   Temp 97.9  F (36.6  C)   Resp 20   Ht 1.803 m (5' 11\")   Wt 93.5 kg (206 lb 3.2 oz)   BMI 28.76 kg/m    Body mass index is 28.76 kg/m .  Physical Exam   GENERAL: healthy, alert and no distress  EYES: Eyes grossly normal to inspection, PERRL and conjunctivae and sclerae normal  HENT: ear canals and TM's normal, nose and mouth without ulcers or lesions  NECK: no adenopathy, no asymmetry, masses, or scars and thyroid normal to palpation  RESP: lungs clear to auscultation - no rales, rhonchi or wheezes  CV: regular rate and rhythm, normal S1 S2, no S3 or S4, no murmur, click or rub, no peripheral edema and peripheral pulses strong  ABDOMEN: soft, nontender, no hepatosplenomegaly, no masses and bowel sounds normal   (male): normal male genitalia without " lesions or urethral discharge, no hernia  MS: no gross musculoskeletal defects noted, no edema    Right 5th digit does trigger and snap    SKIN: erythematous macular rash in grin with leading edge scale  NEURO: Normal strength and tone, mentation intact and speech normal, CN 2-12 intact, normal mentation, Romberg neg  Diabetic foot exam: normal DP and PT pulses, no trophic changes or ulcerative lesions and normal sensory exam    Results for orders placed or performed in visit on 05/03/22 (from the past 24 hour(s))   HEMOGLOBIN A1C (BFP)   Result Value Ref Range    Hemoglobin A1C POCT 8.9 (A) 4.0 - 7.0 %

## 2022-06-22 ENCOUNTER — TELEPHONE (OUTPATIENT)
Dept: SURGERY | Facility: CLINIC | Age: 72
End: 2022-06-22

## 2022-06-22 ENCOUNTER — OFFICE VISIT (OUTPATIENT)
Dept: SURGERY | Facility: CLINIC | Age: 72
End: 2022-06-22
Payer: COMMERCIAL

## 2022-06-22 VITALS
DIASTOLIC BLOOD PRESSURE: 70 MMHG | HEART RATE: 92 BPM | RESPIRATION RATE: 16 BRPM | WEIGHT: 206 LBS | HEIGHT: 71 IN | OXYGEN SATURATION: 96 % | BODY MASS INDEX: 28.84 KG/M2 | SYSTOLIC BLOOD PRESSURE: 132 MMHG

## 2022-06-22 DIAGNOSIS — L72.3 SEBACEOUS CYST: Primary | ICD-10-CM

## 2022-06-22 PROCEDURE — 99202 OFFICE O/P NEW SF 15 MIN: CPT | Performed by: SURGERY

## 2022-06-22 NOTE — TELEPHONE ENCOUNTER
Type of surgery: EXCISION NECK MASS  Location of surgery: [facility]  Date and time of surgery: 7-19-22  Surgeon: DR. BULLOCK   Pre-Op Appt Date: PATIENT TO SCHEDULE   Post-Op Appt Date:    Packet sent out: GIVEN TO PATIENT   Pre-cert/Authorization completed:  Not Applicable  Date: 6-22-22          EXCISION NECK MASS    MAC   PT INST TO HAVE H&P WITH DR. JOSE  45 MINS REQ  PA ASSIST NLG  ALW

## 2022-06-22 NOTE — PROGRESS NOTES
Lars Coleman has a sizable cyst growing on his posterior neck area.   Has had it drained 4 or more times, and it keeps re-growing back.   Not currently infected.    PE:  3+ cm posterior neck cyst.  I & D site on inferior aspect, visible pore above and to the left of the scar.    Assessment:  Sebaceous cyst    Plan:  Recommend excision, will be fairly involved due to size and location.  Plan to do in the OR.    Jacey Eubanks MD

## 2022-06-22 NOTE — LETTER
[facility]    [address], [address]  [place], [place] [address]  Phone [phone]  Fax [phone] [address], [address]  [place] [address]  Phone [phone]  Fax [phone]    [email or web address]   2022       Lars Coleman    RE: [id number]  : 1950    Lars Coleman has been scheduled for surgery on 22 at 12:50 pm at [facility].  The hospital is located at [address] in [place].      Please check in at the Surgery reception desk at 10:50 am. This is located in the back of the [facility] on the East side, just past the Emergency Room entrance.       DO NOT EAT OR DRINK ANYTHING AFTER MIDNIGHT THE NIGHT BEFORE YOUR  SURGERY.   You may have sips of clear liquids up until 2 hours before your surgery.   If you have been advised to take your medication, please do this early in the morning with just sips of clear liquid.       Hospital regulations require an updated pre-operative examination to be completed within 30 days of the procedure. This can be done by your primary care provider. Please ask them to fax documentation to [phone]. We also recommend you bring a copy with you.       During the current pandemic, a COVID-19 at home rapid antigen test is required   1-2 days before your procedure per hospital regulations. You can buy these at many pharmacy stores. Or you can order free, at-home tests at covid.gov/tests  ? If your test is negative, please take a photo of your test. Show the photo to the nurse when you come in for your procedure.  ? If your test is positive, please call our office at [phone].      You should shower before your surgery with Hibiclens or Exidine soap.  This can be found at your local pharmacy or you can pick it up from our office for free.  Please call our office if you have any questions.       You will be required to have an Adult  (friend or family member) drive you home after your surgery and arrange for an adult to stay with you until the next morning.       You will receive several calls from our staff 3-7 days prior to your scheduled procedure with further details and to answer any questions you may have.      It is sometimes necessary to adjust the surgery schedule due to emergencies and additions to the schedule.  If your surgery is affected by this, we greatly appreciate your flexibility and understanding in this matter      It is best if you call regarding post-operative questions between the hours of 8:00 am & 3:00 pm Monday-Friday, so you have access to the daytime care team that know you best.  ? Prescription refills are accepted during regular office hours only.      Please do not bring and Disability or FMLA papers to the hospital.  They need to be either faxed ([phone]), mailed or hand delivered to our office by you or a family member for completion.  ? Please allow 14 business days to complete paperwork.        If you have questions or concerns, please contact our office at [phone].

## 2022-06-22 NOTE — LETTER
2022    RE: Lars Coleman, : 1950      Lars Coleman has a sizable cyst growing on his posterior neck area.   Has had it drained 4 or more times, and it keeps re-growing back.   Not currently infected.     PE:  3+ cm posterior neck cyst.  I & D site on inferior aspect, visible pore above and to the left of the scar.     Assessment:  Sebaceous cyst     Plan:  Recommend excision, will be fairly involved due to size and location.  Plan to do in the OR.         Jacey Eubanks MD

## 2022-07-06 ENCOUNTER — OFFICE VISIT (OUTPATIENT)
Dept: FAMILY MEDICINE | Facility: CLINIC | Age: 72
End: 2022-07-06

## 2022-07-06 VITALS
RESPIRATION RATE: 20 BRPM | DIASTOLIC BLOOD PRESSURE: 72 MMHG | SYSTOLIC BLOOD PRESSURE: 136 MMHG | TEMPERATURE: 97.9 F | BODY MASS INDEX: 29.03 KG/M2 | HEIGHT: 71 IN | WEIGHT: 207.4 LBS | HEART RATE: 96 BPM

## 2022-07-06 DIAGNOSIS — E11.9 TYPE 2 DIABETES MELLITUS WITHOUT COMPLICATION, WITH LONG-TERM CURRENT USE OF INSULIN (H): ICD-10-CM

## 2022-07-06 DIAGNOSIS — E78.2 MIXED HYPERLIPIDEMIA: ICD-10-CM

## 2022-07-06 DIAGNOSIS — Z01.818 PREOPERATIVE EXAMINATION: Primary | ICD-10-CM

## 2022-07-06 DIAGNOSIS — Z79.4 TYPE 2 DIABETES MELLITUS WITHOUT COMPLICATION, WITH LONG-TERM CURRENT USE OF INSULIN (H): ICD-10-CM

## 2022-07-06 DIAGNOSIS — R22.1 NECK MASS: ICD-10-CM

## 2022-07-06 LAB
% GRANULOCYTES: 68.9 %
HBA1C MFR BLD: 10.1 % (ref 4–7)
HCT VFR BLD AUTO: 46.7 % (ref 40–53)
HEMOGLOBIN: 15.8 G/DL (ref 13.3–17.7)
LYMPHOCYTES NFR BLD AUTO: 21.9 %
MCH RBC QN AUTO: 30.9 PG (ref 26–33)
MCHC RBC AUTO-ENTMCNC: 33.8 G/DL (ref 31–36)
MCV RBC AUTO: 91.3 FL (ref 78–100)
MONOCYTES NFR BLD AUTO: 9.2 %
PLATELET COUNT - QUEST: 191 10^9/L (ref 150–375)
RBC # BLD AUTO: 5.12 10*12/L (ref 4.4–5.9)
WBC # BLD AUTO: 6.6 10*9/L (ref 4–11)

## 2022-07-06 PROCEDURE — 93000 ELECTROCARDIOGRAM COMPLETE: CPT | Performed by: FAMILY MEDICINE

## 2022-07-06 PROCEDURE — 99214 OFFICE O/P EST MOD 30 MIN: CPT | Mod: 25 | Performed by: FAMILY MEDICINE

## 2022-07-06 PROCEDURE — 85025 COMPLETE CBC W/AUTO DIFF WBC: CPT | Performed by: FAMILY MEDICINE

## 2022-07-06 PROCEDURE — 83036 HEMOGLOBIN GLYCOSYLATED A1C: CPT | Performed by: FAMILY MEDICINE

## 2022-07-06 PROCEDURE — 36415 COLL VENOUS BLD VENIPUNCTURE: CPT | Performed by: FAMILY MEDICINE

## 2022-07-06 NOTE — H&P (VIEW-ONLY)
[facility] PHYSICIANS  [address]  [address]  [facility] [address]  Phone: [phone]  Fax: [phone]  Primary Provider: David Pena  Pre-op Performing Provider: DAVID PENA      PREOPERATIVE EVALUATION:  Today's date: 7/6/2022    Lars Coleman is a 71 year old male who presents for a preoperative evaluation.    Surgical Information:  Surgery/Procedure: excision neck mass  Surgery Location: [facility]  Surgeon: Dr Eubanks  Surgery Date: 7/19/22  Time of Surgery: pm  Where patient plans to recover: At home with family  Fax number for surgical facility: Note does not need to be faxed, will be available electronically in Epic.    Type of Anesthesia Anticipated: to be determined    Assessment & Plan     The proposed surgical procedure is considered INTERMEDIATE risk.    Preoperative examination    - HEMOGRAM PLATELET DIFF (BFP)  - HEMOGLOBIN A1C (BFP)  - VENOUS COLLECTION  - EKG 12-lead complete w/read - Clinics    Neck mass    - HEMOGRAM PLATELET DIFF (BFP)  - HEMOGLOBIN A1C (BFP)  - VENOUS COLLECTION  - EKG 12-lead complete w/read - Clinics    Type 2 diabetes mellitus without complication, with long-term current use of insulin (H)  Very poor control, did not follow up with MTM as planned at 5/22 visit- will once again refer back to MTM to help with medications- not taking ozempic-surgeon informed of high A1C  - HEMOGLOBIN A1C (BFP)  - VENOUS COLLECTION  - EKG 12-lead complete w/read - Clinics    Mixed hyperlipidemia  Well controlled, continue current medications at current doses           Risks and Recommendations:  The patient has the following additional risks and recommendations for perioperative complications:   - No identified additional risk factors other than previously addressed    Medication Instructions:  Patient is to take all scheduled medications on the day of surgery EXCEPT for modifications listed below:   - aspirin: Discontinue aspirin 7-10 days prior to  procedure to reduce bleeding risk. It should be resumed postoperatively.    - Long acting insulin (e.g. glargine, detemir): Take 80% of the usual evening or morning dose before surgery.   - metformin: HOLD day of surgery.    RECOMMENDATION:  APPROVAL GIVEN to proceed with proposed procedure, without further diagnostic evaluation.    Review of external notes as documented above   Review of the result(s) of each unique test - labs and EKG     Discussed high A1C with surgeon-she is OK with surgery                  Subjective     1. No - Have you ever had a heart attack or stroke?  2. No - Have you ever had surgery on your heart or blood vessels, such as a stent, coronary (heart) bypass, or surgery on an artery in the head, neck, heart, or legs?  3. No - Do you have chest pain when you are physically active?  4. No - Do you have a history of heart failure?  5. No - Do you currently have a cold, bronchitis, or symptoms of other respiratory (head and chest) infections?  6. No - Do you have a cough, shortness of breath, or wheezing?  7. No - Do you or anyone in your family have a history of blood clots?  8. No - Do you or anyone in your family have a serious bleeding problem, such as long-lasting bleeding after surgeries or cuts?  9. No - Have you ever had anemia or been told to take iron pills?  10. No - Have you had any abnormal blood loss such as black, tarry or bloody stools, or abnormal vaginal bleeding?  11. No - Have you ever had a blood transfusion?  12. Yes - Are you willing to have a blood transfusion if it is medically needed before, during, or after your surgery?  13. No - Have you or anyone in your family ever had problems with anesthesia (sedation for surgery)?  14. No - Do you have sleep apnea, excessive snoring, or daytime drowsiness?   15. No - Do you have any artifical heart valves or other implanted medical devices, such as a pacemaker, defibrillator, or continuous glucose monitor?  16. No - Do you have  any artifical joints?  17. No - Are you allergic to latex?  18. No - Is there any chance that you may be pregnant?      Health Care Directive:  Patient does not have a Health Care Directive or Living Will:Will bring into clinic    Preoperative Review of :   reviewed - no record of controlled substances prescribed.      Status of Chronic Conditions:  See problem list for active medical problems.  Problems all longstanding and stable, except as noted/documented.  See ROS for pertinent symptoms related to these conditions.    DIABETES - Patient has a longstanding history of DiabetesType Type II . Patient is being treated with oral agents, insulin injections and ASA and denies significant side effects. Control has been poor. Complicating factors include but are not limited to: hyperlipidemia.       Review of Systems  CONSTITUTIONAL: NEGATIVE for fever, chills, change in weight  ENT/MOUTH: NEGATIVE for ear, mouth and throat problems  RESP: NEGATIVE for significant cough or SOB  CV: NEGATIVE for chest pain, palpitations or peripheral edema    Patient Active Problem List    Diagnosis Date Noted     ACP (advance care planning) 03/30/2021     Priority: Medium     Tinnitus, bilateral 12/08/2020     Priority: Medium     Tremor 12/08/2020     Priority: Medium     Urinary urgency 12/08/2020     Priority: Medium     Persistent insomnia 12/08/2020     Priority: Medium     Doing trial of melatonin 12/2020       Type 2 diabetes mellitus without complication, with long-term current use of insulin (H) 08/22/2017     Priority: Medium     Special screening for malignant neoplasm of prostate 07/22/2015     Priority: Medium       Advance Care Planning 3/9/2016: ACP Review of Chart / Resources Provided:  Reviewed chart for advance care plan.  Lars Coleman has no plan or code status on file. Discussed available resources and provided with information. Confirmed code status reflects current choices pending further ACP discussions.   Confirmed/documented legally designated decision makers.  Added by Jacey Pringle               Calculus of kidney 02/10/2004     Priority: Medium     Mixed hyperlipidemia 11/07/2002     Priority: Medium     Family history of malignant neoplasm of gastrointestinal tract 11/07/2002     Priority: Medium     Health Care Home 03/09/2016     Priority: Low      Past Medical History:   Diagnosis Date     Kidney stone      Mixed hyperlipidemia      Pancreatic disease      Pancreatic lesion 11/2020    Noted in ED, needs follow up.     Squamous cell carcinoma of skin of face      Type 2 diabetes mellitus      Past Surgical History:   Procedure Laterality Date     APPENDECTOMY       ESOPHAGOSCOPY, GASTROSCOPY, DUODENOSCOPY (EGD), COMBINED N/A 1/11/2021    Procedure: ESOPHAGOGASTRODUODENOSCOPY, WITH FINE NEEDLE ASPIRATION BIOPSY, WITH ENDOSCOPIC ULTRASOUND GUIDANCE;  Surgeon: Clara Tracy MD;  Location: [facility] OR     VASECTOMY       Current Outpatient Medications   Medication Sig Dispense Refill     Acetylcarnitine HCl (ACETYL-L-CARNITINE HCL) POWD daily       Ascorbic Acid (VITAMIN C PO) Take by mouth daily       atorvastatin (LIPITOR) 20 MG tablet Take 0.5 tablets (10 mg) by mouth daily 90 tablet 1     blood glucose (NO BRAND SPECIFIED) lancets standard Use to test blood sugar three times daily or as directed. 300 each 1     Cholecalciferol (VITAMIN D PO) Take by mouth daily       coenzyme Q10 (CO-Q10) 30 MG capsule Take 1 capsule (30 mg) by mouth daily       Green Tea, Camillia sinensis, (GREEN TEA EXTRACT PO) Take by mouth daily       insulin glargine (LANTUS SOLOSTAR) 100 UNIT/ML pen Inject 43 Units Subcutaneous At Bedtime 45 mL 0     insulin pen needle (ULTICARE MINI) 31G X 6 MM miscellaneous daily 100 each 1     KRILL OIL PO Take 1 capsule by mouth daily       LITHIUM PO Take 1,000 mg by mouth daily       MAGNESIUM PO Take by mouth daily       metFORMIN (GLUCOPHAGE-XR) 500 MG 24 hr tablet Take 4 tablets (2,000 mg)  "by mouth daily (with dinner) 360 tablet 1     MILK THISTLE PO Take 1 tablet by mouth daily       Nutritional Supplements (NUTRITIONAL SUPPLEMENT PO) Cornitex, Cinsulin, TriSugar Shield, Nurish Green Energy, Amazing Grass Green Superfood       ONETOUCH ULTRA test strip TEST BLOOD SUGAR 4 TIMES DAILY 200 strip 3       12 mL 0     TAURINE PO Take 1 tablet by mouth daily       TURMERIC PO Take 1 tablet by mouth daily         Allergies   Allergen Reactions     No Known Drug Allergies         Social History     Tobacco Use     Smoking status: Never Smoker     Smokeless tobacco: Never Used   Substance Use Topics     Alcohol use: Yes     Alcohol/week: 0.0 standard drinks     Comment: rare     Family History   Problem Relation Age of Onset     Cancer - colorectal Father         rectal cancer     Cerebrovascular Disease Father         in his 40s (smoker)     Diabetes Brother      C.A.D. No family hx of      Prostate Cancer No family hx of      History   Drug Use No         Objective     /72 (BP Location: Left arm, Patient Position: Chair, Cuff Size: Adult Regular)   Pulse 96   Temp 97.9  F (36.6  C)   Resp 20   Ht 1.803 m (5' 11\")   Wt 94.1 kg (207 lb 6.4 oz)   BMI 28.93 kg/m      Physical Exam  GENERAL APPEARANCE: healthy, alert and no distress  HENT: ear canals and TM's normal and nose and mouth without ulcers or lesions  RESP: lungs clear to auscultation - no rales, rhonchi or wheezes  CV: regular rate and rhythm, normal S1 S2, no S3 or S4 and no murmur, click or rub   ABDOMEN: soft, nontender, no HSM or masses and bowel sounds normal  NEURO: Normal strength and tone, sensory exam grossly normal, mentation intact and speech normal    Recent Labs   Lab Test 05/03/22  0000 11/22/21  0804 11/22/21  0000 01/07/21  1411 01/07/21  1404 11/14/20  2239   HGB  --   --   --   --  15.4 15.7   PLT  --   --   --   --  167 214   .3  --  140.7   < >  --  136   POTASSIUM 4.11  --  5.38*   < >  --  4.1   CR 0.95  --  0.93 "   < >  --  0.68   A1C 8.9* 8.5*  --    < >  --   --     < > = values in this interval not displayed.        Diagnostics:  Recent Results (from the past 24 hour(s))   HEMOGRAM PLATELET DIFF (BFP)    Collection Time: 07/06/22  2:06 PM   Result Value Ref Range    WBC 6.6 4.0 - 11 10*9/L    RBC Count 5.12 4.4 - 5.9 10*12/L    Hemoglobin 15.8 13.3 - 17.7 g/dL    Hematocrit 46.7 40.0 - 53.0 %    MCV 91.3 78 - 100 fL    MCH 30.9 26 - 33 pg    MCHC 33.8 31 - 36 g/dL    Platelet Count 191 150 - 375 10^9/L    % Granulocytes 68.9 %    % Lymphocytes 21.9 %    % Monocytes 9.2 %   HEMOGLOBIN A1C (BFP)    Collection Time: 07/06/22  2:11 PM   Result Value Ref Range    Hemoglobin A1C POCT 10.1 (A) 4.0 - 7.0 %      EKG: appears normal, NSR, normal axis, normal intervals, no acute ST/T changes c/w ischemia, no LVH by voltage criteria, unchanged from previous tracings    Revised Cardiac Risk Index (RCRI):  The patient has the following serious cardiovascular risks for perioperative complications:   - Diabetes Mellitus (on Insulin) = 1 point     RCRI Interpretation: 1 point: Class II (low risk - 0.9% complication rate)           Signed Electronically by: Pop Pena MD  Copy of this evaluation report is provided to requesting physician.

## 2022-07-06 NOTE — PROGRESS NOTES
[facility] PHYSICIANS  [address]  [address]  [facility] [address]  Phone: [phone]  Fax: [phone]  Primary Provider: David Pena  Pre-op Performing Provider: DAVID PENA      PREOPERATIVE EVALUATION:  Today's date: 7/6/2022    Lars Coleman is a 71 year old male who presents for a preoperative evaluation.    Surgical Information:  Surgery/Procedure: excision neck mass  Surgery Location: [facility]  Surgeon: Dr Eubanks  Surgery Date: 7/19/22  Time of Surgery: pm  Where patient plans to recover: At home with family  Fax number for surgical facility: Note does not need to be faxed, will be available electronically in Epic.    Type of Anesthesia Anticipated: to be determined    Assessment & Plan     The proposed surgical procedure is considered INTERMEDIATE risk.    Preoperative examination    - HEMOGRAM PLATELET DIFF (BFP)  - HEMOGLOBIN A1C (BFP)  - VENOUS COLLECTION  - EKG 12-lead complete w/read - Clinics    Neck mass    - HEMOGRAM PLATELET DIFF (BFP)  - HEMOGLOBIN A1C (BFP)  - VENOUS COLLECTION  - EKG 12-lead complete w/read - Clinics    Type 2 diabetes mellitus without complication, with long-term current use of insulin (H)  Very poor control, did not follow up with MTM as planned at 5/22 visit- will once again refer back to MTM to help with medications- not taking ozempic-surgeon informed of high A1C  - HEMOGLOBIN A1C (BFP)  - VENOUS COLLECTION  - EKG 12-lead complete w/read - Clinics    Mixed hyperlipidemia  Well controlled, continue current medications at current doses           Risks and Recommendations:  The patient has the following additional risks and recommendations for perioperative complications:   - No identified additional risk factors other than previously addressed    Medication Instructions:  Patient is to take all scheduled medications on the day of surgery EXCEPT for modifications listed below:   - aspirin: Discontinue aspirin 7-10 days prior to  procedure to reduce bleeding risk. It should be resumed postoperatively.    - Long acting insulin (e.g. glargine, detemir): Take 80% of the usual evening or morning dose before surgery.   - metformin: HOLD day of surgery.    RECOMMENDATION:  APPROVAL GIVEN to proceed with proposed procedure, without further diagnostic evaluation.    Review of external notes as documented above   Review of the result(s) of each unique test - labs and EKG     Discussed high A1C with surgeon-she is OK with surgery                  Subjective     1. No - Have you ever had a heart attack or stroke?  2. No - Have you ever had surgery on your heart or blood vessels, such as a stent, coronary (heart) bypass, or surgery on an artery in the head, neck, heart, or legs?  3. No - Do you have chest pain when you are physically active?  4. No - Do you have a history of heart failure?  5. No - Do you currently have a cold, bronchitis, or symptoms of other respiratory (head and chest) infections?  6. No - Do you have a cough, shortness of breath, or wheezing?  7. No - Do you or anyone in your family have a history of blood clots?  8. No - Do you or anyone in your family have a serious bleeding problem, such as long-lasting bleeding after surgeries or cuts?  9. No - Have you ever had anemia or been told to take iron pills?  10. No - Have you had any abnormal blood loss such as black, tarry or bloody stools, or abnormal vaginal bleeding?  11. No - Have you ever had a blood transfusion?  12. Yes - Are you willing to have a blood transfusion if it is medically needed before, during, or after your surgery?  13. No - Have you or anyone in your family ever had problems with anesthesia (sedation for surgery)?  14. No - Do you have sleep apnea, excessive snoring, or daytime drowsiness?   15. No - Do you have any artifical heart valves or other implanted medical devices, such as a pacemaker, defibrillator, or continuous glucose monitor?  16. No - Do you have  any artifical joints?  17. No - Are you allergic to latex?  18. No - Is there any chance that you may be pregnant?      Health Care Directive:  Patient does not have a Health Care Directive or Living Will:Will bring into clinic    Preoperative Review of :   reviewed - no record of controlled substances prescribed.      Status of Chronic Conditions:  See problem list for active medical problems.  Problems all longstanding and stable, except as noted/documented.  See ROS for pertinent symptoms related to these conditions.    DIABETES - Patient has a longstanding history of DiabetesType Type II . Patient is being treated with oral agents, insulin injections and ASA and denies significant side effects. Control has been poor. Complicating factors include but are not limited to: hyperlipidemia.       Review of Systems  CONSTITUTIONAL: NEGATIVE for fever, chills, change in weight  ENT/MOUTH: NEGATIVE for ear, mouth and throat problems  RESP: NEGATIVE for significant cough or SOB  CV: NEGATIVE for chest pain, palpitations or peripheral edema    Patient Active Problem List    Diagnosis Date Noted     ACP (advance care planning) 03/30/2021     Priority: Medium     Tinnitus, bilateral 12/08/2020     Priority: Medium     Tremor 12/08/2020     Priority: Medium     Urinary urgency 12/08/2020     Priority: Medium     Persistent insomnia 12/08/2020     Priority: Medium     Doing trial of melatonin 12/2020       Type 2 diabetes mellitus without complication, with long-term current use of insulin (H) 08/22/2017     Priority: Medium     Special screening for malignant neoplasm of prostate 07/22/2015     Priority: Medium       Advance Care Planning 3/9/2016: ACP Review of Chart / Resources Provided:  Reviewed chart for advance care plan.  Lars Coleman has no plan or code status on file. Discussed available resources and provided with information. Confirmed code status reflects current choices pending further ACP discussions.   Confirmed/documented legally designated decision makers.  Added by Jacey Pringle               Calculus of kidney 02/10/2004     Priority: Medium     Mixed hyperlipidemia 11/07/2002     Priority: Medium     Family history of malignant neoplasm of gastrointestinal tract 11/07/2002     Priority: Medium     Health Care Home 03/09/2016     Priority: Low      Past Medical History:   Diagnosis Date     Kidney stone      Mixed hyperlipidemia      Pancreatic disease      Pancreatic lesion 11/2020    Noted in ED, needs follow up.     Squamous cell carcinoma of skin of face      Type 2 diabetes mellitus      Past Surgical History:   Procedure Laterality Date     APPENDECTOMY       ESOPHAGOSCOPY, GASTROSCOPY, DUODENOSCOPY (EGD), COMBINED N/A 1/11/2021    Procedure: ESOPHAGOGASTRODUODENOSCOPY, WITH FINE NEEDLE ASPIRATION BIOPSY, WITH ENDOSCOPIC ULTRASOUND GUIDANCE;  Surgeon: Clara Tracy MD;  Location: [facility] OR     VASECTOMY       Current Outpatient Medications   Medication Sig Dispense Refill     Acetylcarnitine HCl (ACETYL-L-CARNITINE HCL) POWD daily       Ascorbic Acid (VITAMIN C PO) Take by mouth daily       atorvastatin (LIPITOR) 20 MG tablet Take 0.5 tablets (10 mg) by mouth daily 90 tablet 1     blood glucose (NO BRAND SPECIFIED) lancets standard Use to test blood sugar three times daily or as directed. 300 each 1     Cholecalciferol (VITAMIN D PO) Take by mouth daily       coenzyme Q10 (CO-Q10) 30 MG capsule Take 1 capsule (30 mg) by mouth daily       Green Tea, Camillia sinensis, (GREEN TEA EXTRACT PO) Take by mouth daily       insulin glargine (LANTUS SOLOSTAR) 100 UNIT/ML pen Inject 43 Units Subcutaneous At Bedtime 45 mL 0     insulin pen needle (ULTICARE MINI) 31G X 6 MM miscellaneous daily 100 each 1     KRILL OIL PO Take 1 capsule by mouth daily       LITHIUM PO Take 1,000 mg by mouth daily       MAGNESIUM PO Take by mouth daily       metFORMIN (GLUCOPHAGE-XR) 500 MG 24 hr tablet Take 4 tablets (2,000 mg)  "by mouth daily (with dinner) 360 tablet 1     MILK THISTLE PO Take 1 tablet by mouth daily       Nutritional Supplements (NUTRITIONAL SUPPLEMENT PO) Cornitex, Cinsulin, TriSugar Shield, Nurish Green Energy, Amazing Grass Green Superfood       ONETOUCH ULTRA test strip TEST BLOOD SUGAR 4 TIMES DAILY 200 strip 3       12 mL 0     TAURINE PO Take 1 tablet by mouth daily       TURMERIC PO Take 1 tablet by mouth daily         Allergies   Allergen Reactions     No Known Drug Allergies         Social History     Tobacco Use     Smoking status: Never Smoker     Smokeless tobacco: Never Used   Substance Use Topics     Alcohol use: Yes     Alcohol/week: 0.0 standard drinks     Comment: rare     Family History   Problem Relation Age of Onset     Cancer - colorectal Father         rectal cancer     Cerebrovascular Disease Father         in his 40s (smoker)     Diabetes Brother      C.A.D. No family hx of      Prostate Cancer No family hx of      History   Drug Use No         Objective     /72 (BP Location: Left arm, Patient Position: Chair, Cuff Size: Adult Regular)   Pulse 96   Temp 97.9  F (36.6  C)   Resp 20   Ht 1.803 m (5' 11\")   Wt 94.1 kg (207 lb 6.4 oz)   BMI 28.93 kg/m      Physical Exam  GENERAL APPEARANCE: healthy, alert and no distress  HENT: ear canals and TM's normal and nose and mouth without ulcers or lesions  RESP: lungs clear to auscultation - no rales, rhonchi or wheezes  CV: regular rate and rhythm, normal S1 S2, no S3 or S4 and no murmur, click or rub   ABDOMEN: soft, nontender, no HSM or masses and bowel sounds normal  NEURO: Normal strength and tone, sensory exam grossly normal, mentation intact and speech normal    Recent Labs   Lab Test 05/03/22  0000 11/22/21  0804 11/22/21  0000 01/07/21  1411 01/07/21  1404 11/14/20  2239   HGB  --   --   --   --  15.4 15.7   PLT  --   --   --   --  167 214   .3  --  140.7   < >  --  136   POTASSIUM 4.11  --  5.38*   < >  --  4.1   CR 0.95  --  0.93 "   < >  --  0.68   A1C 8.9* 8.5*  --    < >  --   --     < > = values in this interval not displayed.        Diagnostics:  Recent Results (from the past 24 hour(s))   HEMOGRAM PLATELET DIFF (BFP)    Collection Time: 07/06/22  2:06 PM   Result Value Ref Range    WBC 6.6 4.0 - 11 10*9/L    RBC Count 5.12 4.4 - 5.9 10*12/L    Hemoglobin 15.8 13.3 - 17.7 g/dL    Hematocrit 46.7 40.0 - 53.0 %    MCV 91.3 78 - 100 fL    MCH 30.9 26 - 33 pg    MCHC 33.8 31 - 36 g/dL    Platelet Count 191 150 - 375 10^9/L    % Granulocytes 68.9 %    % Lymphocytes 21.9 %    % Monocytes 9.2 %   HEMOGLOBIN A1C (BFP)    Collection Time: 07/06/22  2:11 PM   Result Value Ref Range    Hemoglobin A1C POCT 10.1 (A) 4.0 - 7.0 %      EKG: appears normal, NSR, normal axis, normal intervals, no acute ST/T changes c/w ischemia, no LVH by voltage criteria, unchanged from previous tracings    Revised Cardiac Risk Index (RCRI):  The patient has the following serious cardiovascular risks for perioperative complications:   - Diabetes Mellitus (on Insulin) = 1 point     RCRI Interpretation: 1 point: Class II (low risk - 0.9% complication rate)           Signed Electronically by: Pop Pena MD  Copy of this evaluation report is provided to requesting physician.

## 2022-07-06 NOTE — NURSING NOTE
Lars Coleman is here for a pre-op exam.    Questioned patient about current smoking habits.  Pt. has never smoked.  PULSE regular  My Chart: active  CLASSIFICATION OF OVERWEIGHT AND OBESITY BY BMI                        Obesity Class           BMI(kg/m2)  Underweight                                    < 18.5  Normal                                         18.5-24.9  Overweight                                     25.0-29.9  OBESITY                     I                  30.0-34.9                             II                 35.0-39.9  EXTREME OBESITY             III                >40                            Patient's  BMI Body mass index is 28.93 kg/m .  http://hin.nhlbi.nih.gov/menuplanner/menu.cgi  Pre-visit planning  Immunizations - up to date  Colonoscopy - is due  Mammogram -   Asthma -   PHQ9 -    ANYA-7 -

## 2022-07-14 ENCOUNTER — OFFICE VISIT (OUTPATIENT)
Dept: FAMILY MEDICINE | Facility: CLINIC | Age: 72
End: 2022-07-14

## 2022-07-14 DIAGNOSIS — Z79.4 TYPE 2 DIABETES MELLITUS WITHOUT COMPLICATION, WITH LONG-TERM CURRENT USE OF INSULIN (H): Primary | ICD-10-CM

## 2022-07-14 DIAGNOSIS — E11.9 TYPE 2 DIABETES MELLITUS WITHOUT COMPLICATION, WITH LONG-TERM CURRENT USE OF INSULIN (H): Primary | ICD-10-CM

## 2022-07-14 RX ORDER — INSULIN GLARGINE 100 [IU]/ML
43 INJECTION, SOLUTION SUBCUTANEOUS AT BEDTIME
Qty: 45 ML | Refills: 0 | Status: SHIPPED | OUTPATIENT
Start: 2022-07-14 | End: 2022-11-10

## 2022-07-14 RX ORDER — SEMAGLUTIDE 1.34 MG/ML
0.25 INJECTION, SOLUTION SUBCUTANEOUS
Qty: 1.5 ML | Refills: 11 | Status: SHIPPED | OUTPATIENT
Start: 2022-07-14 | End: 2022-12-08

## 2022-07-14 NOTE — Clinical Note
Attempting to restart Ozempic. Cost is an issue. Reached out to insurance and sent patient message explaining. Hoping this will be an option.

## 2022-07-15 NOTE — PROGRESS NOTES
SUBJECTIVE/OBJECTIVE:                Lars Coleman is a 71 year old male seen for a follow-up visit for Medication Management Services.  He was referred to me from Dr. Pop Pena.     Chief Complaint: Follow up from [facility] visit on 12/23/2022.      Diabetes:  Current Medications:  Current Outpatient Medications   Medication     insulin glargine (LANTUS SOLOSTAR) 100 UNIT/ML pen     semaglutide (OZEMPIC, 0.25 OR 0.5 MG/DOSE,) 2 MG/1.5ML SOPN pen     Acetylcarnitine HCl (ACETYL-L-CARNITINE HCL) POWD     Ascorbic Acid (VITAMIN C PO)     atorvastatin (LIPITOR) 20 MG tablet     blood glucose (NO BRAND SPECIFIED) lancets standard     Cholecalciferol (VITAMIN D PO)     coenzyme Q10 (CO-Q10) 30 MG capsule     Green Tea, Camillia sinensis, (GREEN TEA EXTRACT PO)     insulin pen needle (ULTICARE MINI) 31G X 6 MM miscellaneous     KRILL OIL PO     LITHIUM PO     MAGNESIUM PO     metFORMIN (GLUCOPHAGE-XR) 500 MG 24 hr tablet     MILK THISTLE PO     Nutritional Supplements (NUTRITIONAL SUPPLEMENT PO)     ONETOUCH ULTRA test strip     TAURINE PO     TURMERIC PO     No current facility-administered medications for this visit.     We discussed the benefits and risks of each medication.  Patient Questions/Concerns:  Worsening diabetes control, cost of Ozempic  Labs:  Hemoglobin A1C POCT   Date Value Ref Range Status   07/06/2022 10.1 (A) 4.0 - 7.0 % Final   05/03/2022 8.9 (A) 4.0 - 7.0 % Final   11/22/2021 8.5 (A) 4.0 - 7.0 % Final     Additional Information:  Patient did not continue with Ozempic therapy due to cost. Would like to revisit to see if coverage has improved or what cost savings we can look into.    ASSESSMENT/PLAN:                Diabetes:  Assessment: Diabetes control has worsened. Patient wanting to take control of blood sugars, with concerns of hypoglycemia. We discussed the addition of Ozempic to regimen again as he saw great benefit previously on Ozempic, with the understanding that if numbers are  improving, we will montior BG closely to adjust Lantus if needed.  Patient has good understanding of diet and better choices and understands the benefit of exercise in relation to diabetes control. He currently has good control over his diet, recognizing he could cut out a few more sweets, and will continue with an active lifestyle.   [place], so we would like to get a solid plan in place before his departure.  Discussed possibility of patient assistance program, however patient would not qualify at this time.  Status: Diabetes control has worsened  Drug Therapy Problems:  1) Additional drug therapy recommended  2) Cost of medication an issue.  Plan:  1) Initiate Ozempic at 0.25mg weekly, increasing to 0.5mg after 4 weeks if effective and no undesirable effects.  2) Reached out to insurance to see what pricing would be. Patient has deductible yet to meet, but insurance states will be $47 dollar copay after deductible met. Reached out to patient to see this would be an option financially or if we need to look at other options.     Also sending in a refill for Lantus.    I spent 30 minutes with this patient today.  All changes were made via collaborative practice agreement with Pop Pena. A copy of the visit note was provided to the patient's primary care pro    Moon Toney, PharmD  Clinical Pharmacist  [facility]  General Clinic Phone: [phone]  Direct Office Phone: [phone]

## 2022-07-19 ENCOUNTER — ANESTHESIA EVENT (OUTPATIENT)
Dept: SURGERY | Facility: CLINIC | Age: 72
End: 2022-07-19
Payer: COMMERCIAL

## 2022-07-19 ENCOUNTER — HOSPITAL ENCOUNTER (OUTPATIENT)
Facility: CLINIC | Age: 72
Discharge: HOME | End: 2022-07-19
Attending: SURGERY | Admitting: SURGERY
Payer: COMMERCIAL

## 2022-07-19 ENCOUNTER — ANESTHESIA (OUTPATIENT)
Dept: SURGERY | Facility: CLINIC | Age: 72
End: 2022-07-19
Payer: COMMERCIAL

## 2022-07-19 VITALS
HEART RATE: 88 BPM | SYSTOLIC BLOOD PRESSURE: 121 MMHG | HEIGHT: 71 IN | DIASTOLIC BLOOD PRESSURE: 71 MMHG | OXYGEN SATURATION: 94 % | WEIGHT: 206 LBS | BODY MASS INDEX: 28.84 KG/M2 | TEMPERATURE: 96.7 F | RESPIRATION RATE: 16 BRPM

## 2022-07-19 DIAGNOSIS — L72.3 SEBACEOUS CYST: ICD-10-CM

## 2022-07-19 LAB
CREAT SERPL-MCNC: 0.82 MG/DL (ref 0.66–1.25)
GFR SERPL CREATININE-BSD FRML MDRD: >90 ML/MIN/1.73M2
GLUCOSE BLDC GLUCOMTR-MCNC: 205 MG/DL (ref 70–99)

## 2022-07-19 PROCEDURE — 82565 ASSAY OF CREATININE: CPT | Performed by: ANESTHESIOLOGY

## 2022-07-19 PROCEDURE — 250N000011 HC RX IP 250 OP 636: Performed by: SURGERY

## 2022-07-19 PROCEDURE — 250N000009 HC RX 250: Performed by: SURGERY

## 2022-07-19 PROCEDURE — 370N000017 HC ANESTHESIA TECHNICAL FEE, PER MIN: Performed by: SURGERY

## 2022-07-19 PROCEDURE — 250N000011 HC RX IP 250 OP 636: Performed by: NURSE ANESTHETIST, CERTIFIED REGISTERED

## 2022-07-19 PROCEDURE — 88304 TISSUE EXAM BY PATHOLOGIST: CPT | Mod: 26 | Performed by: PATHOLOGY

## 2022-07-19 PROCEDURE — 360N000077 HC SURGERY LEVEL 4, PER MIN: Performed by: SURGERY

## 2022-07-19 PROCEDURE — 710N000012 HC RECOVERY PHASE 2, PER MINUTE: Performed by: SURGERY

## 2022-07-19 PROCEDURE — 272N000001 HC OR GENERAL SUPPLY STERILE: Performed by: SURGERY

## 2022-07-19 PROCEDURE — 82962 GLUCOSE BLOOD TEST: CPT

## 2022-07-19 PROCEDURE — 258N000003 HC RX IP 258 OP 636: Performed by: ANESTHESIOLOGY

## 2022-07-19 PROCEDURE — 88304 TISSUE EXAM BY PATHOLOGIST: CPT | Mod: TC | Performed by: SURGERY

## 2022-07-19 PROCEDURE — 250N000009 HC RX 250: Performed by: NURSE ANESTHETIST, CERTIFIED REGISTERED

## 2022-07-19 PROCEDURE — 36415 COLL VENOUS BLD VENIPUNCTURE: CPT | Performed by: ANESTHESIOLOGY

## 2022-07-19 PROCEDURE — 999N000141 HC STATISTIC PRE-PROCEDURE NURSING ASSESSMENT: Performed by: SURGERY

## 2022-07-19 PROCEDURE — 11403 EXC TR-EXT B9+MARG 2.1-3CM: CPT | Performed by: SURGERY

## 2022-07-19 RX ORDER — CEFAZOLIN SODIUM/WATER 2 G/20 ML
2 SYRINGE (ML) INTRAVENOUS SEE ADMIN INSTRUCTIONS
Status: DISCONTINUED | OUTPATIENT
Start: 2022-07-19 | End: 2022-07-19 | Stop reason: HOSPADM

## 2022-07-19 RX ORDER — HYDROCODONE BITARTRATE AND ACETAMINOPHEN 5; 325 MG/1; MG/1
2 TABLET ORAL
Status: DISCONTINUED | OUTPATIENT
Start: 2022-07-19 | End: 2022-07-19 | Stop reason: HOSPADM

## 2022-07-19 RX ORDER — FENTANYL CITRATE 50 UG/ML
INJECTION, SOLUTION INTRAMUSCULAR; INTRAVENOUS PRN
Status: DISCONTINUED | OUTPATIENT
Start: 2022-07-19 | End: 2022-07-19

## 2022-07-19 RX ORDER — ONDANSETRON 2 MG/ML
INJECTION INTRAMUSCULAR; INTRAVENOUS PRN
Status: DISCONTINUED | OUTPATIENT
Start: 2022-07-19 | End: 2022-07-19

## 2022-07-19 RX ORDER — ONDANSETRON 2 MG/ML
4 INJECTION INTRAMUSCULAR; INTRAVENOUS EVERY 30 MIN PRN
Status: DISCONTINUED | OUTPATIENT
Start: 2022-07-19 | End: 2022-07-19 | Stop reason: HOSPADM

## 2022-07-19 RX ORDER — ACETAMINOPHEN 325 MG/1
975 TABLET ORAL EVERY 6 HOURS PRN
Status: DISCONTINUED | OUTPATIENT
Start: 2022-07-19 | End: 2022-07-19 | Stop reason: HOSPADM

## 2022-07-19 RX ORDER — LIDOCAINE 40 MG/G
CREAM TOPICAL
Status: DISCONTINUED | OUTPATIENT
Start: 2022-07-19 | End: 2022-07-19 | Stop reason: HOSPADM

## 2022-07-19 RX ORDER — LABETALOL HYDROCHLORIDE 5 MG/ML
10 INJECTION, SOLUTION INTRAVENOUS
Status: CANCELLED | OUTPATIENT
Start: 2022-07-19

## 2022-07-19 RX ORDER — HYDROCODONE BITARTRATE AND ACETAMINOPHEN 5; 325 MG/1; MG/1
1-2 TABLET ORAL EVERY 4 HOURS PRN
Qty: 10 TABLET | Refills: 0 | Status: SHIPPED | OUTPATIENT
Start: 2022-07-19 | End: 2022-08-19

## 2022-07-19 RX ORDER — SODIUM CHLORIDE, SODIUM LACTATE, POTASSIUM CHLORIDE, CALCIUM CHLORIDE 600; 310; 30; 20 MG/100ML; MG/100ML; MG/100ML; MG/100ML
INJECTION, SOLUTION INTRAVENOUS CONTINUOUS
Status: DISCONTINUED | OUTPATIENT
Start: 2022-07-19 | End: 2022-07-19 | Stop reason: HOSPADM

## 2022-07-19 RX ORDER — KETAMINE HYDROCHLORIDE 10 MG/ML
INJECTION INTRAMUSCULAR; INTRAVENOUS PRN
Status: DISCONTINUED | OUTPATIENT
Start: 2022-07-19 | End: 2022-07-19

## 2022-07-19 RX ORDER — HYDRALAZINE HYDROCHLORIDE 20 MG/ML
2.5-5 INJECTION INTRAMUSCULAR; INTRAVENOUS EVERY 10 MIN PRN
Status: CANCELLED | OUTPATIENT
Start: 2022-07-19

## 2022-07-19 RX ORDER — LIDOCAINE HYDROCHLORIDE 10 MG/ML
INJECTION, SOLUTION INFILTRATION; PERINEURAL PRN
Status: DISCONTINUED | OUTPATIENT
Start: 2022-07-19 | End: 2022-07-19

## 2022-07-19 RX ORDER — GLYCOPYRROLATE 0.2 MG/ML
INJECTION, SOLUTION INTRAMUSCULAR; INTRAVENOUS PRN
Status: DISCONTINUED | OUTPATIENT
Start: 2022-07-19 | End: 2022-07-19

## 2022-07-19 RX ORDER — ONDANSETRON 4 MG/1
4 TABLET, ORALLY DISINTEGRATING ORAL EVERY 30 MIN PRN
Status: DISCONTINUED | OUTPATIENT
Start: 2022-07-19 | End: 2022-07-19 | Stop reason: HOSPADM

## 2022-07-19 RX ORDER — PROPOFOL 10 MG/ML
INJECTION, EMULSION INTRAVENOUS CONTINUOUS PRN
Status: DISCONTINUED | OUTPATIENT
Start: 2022-07-19 | End: 2022-07-19

## 2022-07-19 RX ORDER — CEFAZOLIN SODIUM/WATER 2 G/20 ML
2 SYRINGE (ML) INTRAVENOUS
Status: COMPLETED | OUTPATIENT
Start: 2022-07-19 | End: 2022-07-19

## 2022-07-19 RX ADMIN — FENTANYL CITRATE 50 MCG: 50 INJECTION, SOLUTION INTRAMUSCULAR; INTRAVENOUS at 10:37

## 2022-07-19 RX ADMIN — ONDANSETRON HYDROCHLORIDE 4 MG: 2 INJECTION, SOLUTION INTRAVENOUS at 10:44

## 2022-07-19 RX ADMIN — MIDAZOLAM 2 MG: 1 INJECTION INTRAMUSCULAR; INTRAVENOUS at 10:33

## 2022-07-19 RX ADMIN — SODIUM CHLORIDE, POTASSIUM CHLORIDE, SODIUM LACTATE AND CALCIUM CHLORIDE: 600; 310; 30; 20 INJECTION, SOLUTION INTRAVENOUS at 10:30

## 2022-07-19 RX ADMIN — PROPOFOL 75 MCG/KG/MIN: 10 INJECTION, EMULSION INTRAVENOUS at 10:41

## 2022-07-19 RX ADMIN — Medication 2 G: at 10:30

## 2022-07-19 RX ADMIN — Medication 20 MG: at 10:41

## 2022-07-19 RX ADMIN — GLYCOPYRROLATE 0.1 MG: 0.2 INJECTION, SOLUTION INTRAMUSCULAR; INTRAVENOUS at 10:41

## 2022-07-19 RX ADMIN — LIDOCAINE HYDROCHLORIDE 30 MG: 10 INJECTION, SOLUTION INFILTRATION; PERINEURAL at 10:37

## 2022-07-19 NOTE — DISCHARGE INSTRUCTIONS
HOME CARE FOLLOWING MINOR SURGERY  MATEO Stanley, ADALID Lennon R. O Donnell, J. Shaheen    RESULTS:  If a biopsy of tissue was done, you may call for your final pathology report after 1p.m. two working days after surgery.  Otherwise, this will be reviewed with you at time of phone follow-up (described below).    INCISIONAL CARE:  If you have a dressing in place, keep clean and dry for 48 hours; you may replace the gauze if it becomes soiled.  After 48 hours you may remove the dressing and shower.  Do not submerse incision in water for 1 week.  If you have a Dermabond dressing (a type of skin glue), you may shower immediately.  Sutures which are beneath the skin will absorb and do not need to be removed.  Sutures you can see should be removed at your surgeon's office near 2 weeks postop, unless otherwise instructed.  If present, leave the steri-strips (white paper tapes) in place for 14 days after surgery.  If present, leave Dermabond glue in place until it wears/flakes off.  You may expect a small amount of drainage from your incision.  A lump/ridge under the incision is normal and will gradually resolve.  If it becomes red or very uncomfortable, contact the nurse at your surgeon's office to discuss whether this needs to be evaluated.    ACTIVITY:  Cautiously resume exercise and strenuous activities such as jogging, tennis, aerobics, etc. Also, be careful of stretching activities which affect the area of surgery for two weeks.    DIET:  Start with liquids and gradually resume your regular diet as tolerated.  Increased fluid intake is recommended. While taking pain medications, consider use of a stool softener, increase your fiber in your diet, or add a fiber supplement (like Metamucil, Citrucel) to help prevent constipation - a possible side effect of pain medications.    DISCOMFORT:  Local anesthetic placed at surgery should provide relief for 4-8 hours.  Begin taking pain pills before  discomfort is severe.  Take the pain medication with some food, when possible, to minimize side effects.  Intermittent use of ice packs may help during the first 1-3 weeks after surgery.  Expect gradual improvement.    Over-the-counter anti-inflammatory medications (i.e. Ibuprofen/Advil/Motrin or Naprosyn/Aleve) may be used per package instructions in addition to or while tapering off the narcotic pain medications to decrease swelling and sensitivity.  DO NOT TAKE these Anti-inflammatory medications if your primary physician has advised against doing so, or if you have acid reflux, ulcer, or bleeding disorder, or take blood-thinner medications.  Call your primary physician or the surgery office if you have medication questions.      FOLLOW-UP AFTER SURGERY:  -Our office will contact you approximately 2-3 weeks after surgery to check on your progress and answer any questions you may have.  If you are doing well, you will not need to return for an office appointment.  If any concerns are identified over the phone, we will help you make an appointment to see a provider.    -If you have not received a phone call, have any questions or concerns, or would like to be seen, please call us at [phone].  We are located at: [address], [address]; [place] [address]    -CONTACT US IF THE FOLLOWING DEVELOPS:   1. A fever that is above 101     2. Increased redness, warmth, drainage, bleeding, or swelling.   3. Pain that is not relieved by rest/ice and your prescription.   4.  Increasing pain after 48 hours.   5. Drainage that is thick, cloudy, yellow, green or white.   6. Any other questions or concerns.      FREQUENTLY ASKED QUESTIONS:    Q:  How should my incision look?    A:  Normally your incision will appear slightly swollen with light redness directly along the incision itself as it heals.  It may feel like a bump or ridge as the healing/scarring happens, and over time (3-4 months) this bump or ridge feeling  should slowly go away.  In general, clear or pink watery drainage can be normal at first as your incision heals, but should decrease over time.    Q:  How do I know if my incision is infected?  A:  Look at your incision for signs of infection, like redness around the incision spreading to surrounding skin, or drainage of cloudy or foul-smelling drainage.  If you feel warm, check your temperature to see if you are running a fever.    **If any of these things occur, please notify the nurse at our office.  We may need you to come into the office for an incision check.      Q:  How do I take care of my incision?  A:  If you have a dressing in place - Starting the day after surgery, replace the dressing 1-2 times a day until there is no further drainage from the incision.  At that time, a dressing is no longer needed.  Try to minimize tape on the skin if irritation is occurring at the tape sites.  If you have significant irritation from tape on the skin, please call the office to discuss other method of dressing your incision.    Small pieces of tape called  steri-strips  may be present directly overlying your incision; these may be removed 10 days after surgery unless otherwise specified by your surgeon.  If these tapes start to loosen at the ends, you may trim them back until they fall off or are removed.    A:  If you had  Dermabond  tissue glue used as a dressing (this causes your incision to look shiny with a clear covering over it) - This type of dressing wears off with time and does not require more dressings over the top unless it is draining around the glue as it wears off.  Do not apply ointments or lotions over the incisions until the glue has completely worn off.    Q:  There is a piece of tape or a sticky  lead  still on my skin.  Can I remove this?  A:  Sometimes the sticky  leads  used for monitoring during surgery or for evaluation in the emergency department are not all removed while you are in the  hospital.  These sometimes have a tab or metal dot on them.  You can easily remove these on your own, like taking off a band-aid.  If there is a gel substance under the  lead , simply wipe/clean it off with a washcloth or paper towel.      Q:  What can I do to minimize constipation (very hard stools, or lack of stools)?  A:  Stay well hydrated.  Increase your dietary fiber intake or take a fiber supplement -with plenty of water.  Walk around frequently.  You may consider an over-the-counter stool-softener.  Your Pharmacist can assist you with choosing one that is stocked at your pharmacy.  Constipation is also one of the most common side effects of pain medication.  If you are using pain medication, be pro-active and try to PREVENT problems with constipation by taking the steps above BEFORE constipation becomes a problem.    Q:  What do I do if I need more pain medications?  A:  Call the office to receive refills.  Be aware that certain pain meds cannot be called into a pharmacy and actually require a paper prescription.  A change may be made in your pain med as you progress thru your recovery period or if you have side effects to certain meds.    --Pain meds are NOT refilled after 5pm on weekdays, and NOT AT ALL on the weekends, so please look ahead to prevent problems.      Q:  Why am I having a hard time sleeping now that I am at home?  A:  Many medications you receive while you are in the hospital can impact your sleep for a number of days after your surgery/hospitalization.  Decreased level of activity and naps during the day may also make sleeping at night difficult.  Try to minimize day-time naps, and get up frequently during the day to walk around your home during your recovery time.  Sleep aides may be of some help, but are not recommended for long-term use.      Q:  I am having some back discomfort.  What should I do?  A:  This may be related to certain positioning that was required for your surgery,  extended periods of time in bed, or other changes in your overall activity level.  You may try ice, heat, acetaminophen, or ibuprofen to treat this temporarily.  Note that many pain medications have acetaminophen in them and would state this on the prescription bottle.  Be sure not to exceed the maximum of 4000mg per day of acetaminophen.     **If the pain you are having does not resolve, is severe, or is a flare of back pain you have had on other occasions prior to surgery, please contact your primary physician for further recommendations or for an appointment to be examined at their office.    Q:  Why am I having headaches?  A:  Headaches can be caused by many things:  caffeine withdrawal, use of pain meds, dehydration, high blood pressure, lack of sleep, over-activity/exhaustion, flare-up of usual migraine headaches.  If you feel this is related to muscle tension (a band-like feeling around the head, or a pressure at the low-back of the head) you may try ice or heat to this area.  You may need to drink more fluids (try electrolyte drink like Gatorade), rest, or take your usual migraine medications.   **If your headaches do not resolve, worsen, are accompanied by other symptoms, or if your blood pressure is high, please call your primary physician for recommendation and/or examination.    Q:  I am unable to urinate.  What do I do?  A:  A small percentage of people can have difficulty urinating initially after surgery.  This includes being able to urinate only a very small amount at a time and feeling discomfort or pressure in the very low abdomen.  This is called  urinary retention , and is actually an urgent situation.  Proceed to your nearest Emergency department for evaluation (not an Urgent Care Center).  Sometimes the bladder does not work correctly after certain medications you receive during surgery, or related to certain procedures.  You may need to have a catheter placed until your bladder recovers.  When  planning to go to an Emergency department, it may help to call the ER to let them know you are coming in for this problem after a surgery.  This may help you get in quicker to be evaluated.  **If you have symptoms of a urinary tract infection, please contact your primary physician for the proper evaluation and treatment.        If you have other questions, please call the office Monday thru Friday between 8am and 4:30pm to discuss with the nurse or physician assistant.  #[phone]    There is a surgeon ON CALL on weekday evenings and over the weekend in case of urgent need only, and may be contacted at the same number.    If you are having an emergency, call 911 or proceed to your nearest emergency department.  [facility] ADULT DISCHARGE INSTRUCTIONS     SPECIAL PRECAUTIONS FOR 24 HOURS AFTER SURGERY    IT IS NOT UNUSUAL TO FEEL LIGHT-HEADED OR FAINT, UP TO 24 HOURS AFTER SURGERY OR WHILE TAKING PAIN MEDICATION.  IF YOU HAVE THESE SYMPTOMS; SIT FOR A FEW MINUTES BEFORE STANDING AND HAVE SOMEONE ASSIST YOU WHEN YOU GET UP TO WALK OR USE THE BATHROOM.    YOU SHOULD REST AND RELAX FOR THE NEXT 24 HOURS AND YOU MUST MAKE ARRANGEMENTS TO HAVE SOMEONE STAY WITH YOU FOR AT LEAST 24 HOURS AFTER YOUR DISCHARGE.  AVOID HAZARDOUS AND STRENUOUS ACTIVITIES.  DO NOT MAKE IMPORTANT DECISIONS FOR 24 HOURS.    DO NOT DRIVE ANY VEHICLE OR OPERATE MECHANICAL EQUIPMENT FOR 24 HOURS FOLLOWING THE END OF YOUR SURGERY.  EVEN THOUGH YOU MAY FEEL NORMAL, YOUR REACTIONS MAY BE AFFECTED BY THE MEDICATION YOU HAVE RECEIVED.    DO NOT DRINK ALCOHOLIC BEVERAGES FOR 24 HOURS FOLLOWING YOUR SURGERY.    DRINK CLEAR LIQUIDS (APPLE JUICE, GINGER ALE, 7-UP, BROTH, ETC.).  PROGRESS TO YOUR REGULAR DIET AS YOU FEEL ABLE.    YOU MAY HAVE A DRY MOUTH, A SORE THROAT, MUSCLES ACHES OR TROUBLE SLEEPING.  THESE SHOULD GO AWAY AFTER 24 HOURS.    CALL YOUR DOCTOR FOR ANY OF THE FOLLOWING:  SIGNS OF INFECTION (FEVER, GROWING TENDERNESS AT THE SURGERY SITE, A  LARGE AMOUNT OF DRAINAGE OR BLEEDING, SEVERE PAIN, FOUL-SMELLING DRAINAGE, REDNESS OR SWELLING.    IT HAS BEEN OVER 8 TO 10 HOURS SINCE SURGERY AND YOU ARE STILL NOT ABLE TO URINATE (PASS WATER).          You are prescribed Norco, a medication that contains 325mg of acetaminophen per pill. Do not exceed over 4g or 4,000mg of acetaminophen or Tylenol in 24 hours.

## 2022-07-19 NOTE — OP NOTE
PREOPERATIVE DIAGNOSIS: Posterior neck mass.   POSTOPERATIVE DIAGNOSIS: Posterior neck mass.  PROCEDURE: Excisional biopsy Posterior neck mass  ANESTHESIA:  Local with MAC  PREOPERATIVE MEDICATIONS:   Ancef 2 gm  SURGEON: Jacey Bullock MD   ASSISTANT:  Debbie Maldonado PA-C  INDICATIONS:  Lars Coleman is a 71 year old-year-old male with posterior neck mass which keep growing back.  He has had it drained 4 times.  It is currently not infected. He  presents today for excision.   PROCEDURE: The patient was placed prone. Posterior neck was prepped and draped in the usual sterile fashion. Local anesthesia was obtained with 1% lidocaine and 0.25% Marcaine plain. A narrow elliptical  incision is made  over the mass and to excise the visible pore.  The dissection is carried down sharply with scalpel to the mass which is a cyst about 3 cm. It is excised in its entirety. The site is inspected for hemostasis, irrigated and closed using 3-0 Vicry subdermals and 4-0 subcuticular Monocryl. The patient was transferred to recovery in good condition.   ESTIMATED BLOOD LOSS: 5 cc.   INTRAOPERATIVE FINDINGS: Mass excised, pathology pending.   JACEY BULLOCK MD

## 2022-07-19 NOTE — ANESTHESIA POSTPROCEDURE EVALUATION
Patient: Lars Coleman    Procedure: Procedure(s):  EXCISION,of posterior NECK cyst       Anesthesia Type:  MAC    Note:  Disposition: Outpatient   Postop Pain Control: Uneventful            Sign Out: Well controlled pain   PONV: No   Neuro/Psych: Uneventful            Sign Out: Acceptable/Baseline neuro status   Airway/Respiratory: Uneventful            Sign Out: Acceptable/Baseline resp. status   CV/Hemodynamics: Uneventful            Sign Out: Acceptable CV status; No obvious hypovolemia; No obvious fluid overload   Other NRE: NONE   DID A NON-ROUTINE EVENT OCCUR? No           Last vitals:  Vitals Value Taken Time   /71 07/19/22 1239   Temp 96.7  F (35.9  C) 07/19/22 1239   Pulse 88 07/19/22 1239   Resp 16 07/19/22 1239   SpO2 94 % 07/19/22 1239       Electronically Signed By: Sallie Enriquez MD  July 19, 2022  1:56 PM

## 2022-07-19 NOTE — ANESTHESIA PREPROCEDURE EVALUATION
Anesthesia Pre-Procedure Evaluation    Patient: Lars Coleman   MRN: [id number] : 1950        Procedure : Procedure(s):  EXCISION, MASS, NECK          Past Medical History:   Diagnosis Date     Kidney stone      Mixed hyperlipidemia      Pancreatic lesion 2020    Noted in ED, needs follow up.     Squamous cell carcinoma of skin of face      Type 2 diabetes mellitus       Past Surgical History:   Procedure Laterality Date     APPENDECTOMY       ESOPHAGOSCOPY, GASTROSCOPY, DUODENOSCOPY (EGD), COMBINED N/A 2021    Procedure: ESOPHAGOGASTRODUODENOSCOPY, WITH FINE NEEDLE ASPIRATION BIOPSY, WITH ENDOSCOPIC ULTRASOUND GUIDANCE;  Surgeon: Clara Tracy MD;  Location: [facility] OR     VASECTOMY        Allergies   Allergen Reactions     No Known Drug Allergies       Social History     Tobacco Use     Smoking status: Never Smoker     Smokeless tobacco: Never Used   Substance Use Topics     Alcohol use: Yes     Alcohol/week: 0.0 standard drinks     Comment: rare      Wt Readings from Last 1 Encounters:   22 93.9 kg (207 lb)        Anesthesia Evaluation            ROS/MED HX  ENT/Pulmonary:  - neg pulmonary ROS     Neurologic:       Cardiovascular:  - neg cardiovascular ROS     METS/Exercise Tolerance:     Hematologic:       Musculoskeletal:       GI/Hepatic:       Renal/Genitourinary:       Endo:     (+) type II DM,     Psychiatric/Substance Use:       Infectious Disease:       Malignancy:       Other:            Physical Exam    Airway        Mallampati: II   TM distance: > 3 FB   Neck ROM: full     Respiratory Devices and Support         Dental           Cardiovascular   cardiovascular exam normal          Pulmonary   pulmonary exam normal                OUTSIDE LABS:  CBC:   Lab Results   Component Value Date    WBC 6.6 2022    WBC 5.5 2021    HGB 15.8 2022    HGB 15.4 2021    HCT 46.7 2022    HCT 46.1 2021     2022     2021     BMP:    Lab Results   Component Value Date    .3 05/03/2022    .7 11/22/2021    POTASSIUM 4.11 05/03/2022    POTASSIUM 5.38 (A) 11/22/2021    CHLORIDE 100.3 05/03/2022    CHLORIDE 103.5 11/22/2021    CO2 29.5 11/22/2021    CO2 31.5 03/30/2021    BUN 16 05/03/2022    BUN 16.8 05/03/2022    CR 0.95 05/03/2022    CR 0.93 11/22/2021     (A) 05/03/2022     (A) 11/22/2021     COAGS: No results found for: PTT, INR, FIBR  POC:   Lab Results   Component Value Date     (H) 01/11/2021     HEPATIC:   Lab Results   Component Value Date    ALBUMIN 4.6 05/03/2022    PROTTOTAL 7.8 05/03/2022    ALT 60 11/14/2020    AST 32 11/14/2020    ALKPHOS 86 05/03/2022    BILITOTAL 0.9 05/03/2022     OTHER:   Lab Results   Component Value Date    PH 5.0 01/06/2004    LACT 2.5 (H) 11/15/2020    A1C 10.1 (A) 07/06/2022    EDWAR 9.5 05/03/2022    LIPASE 540 (H) 11/14/2020    TSH 2.13 02/27/2018    SED 46 01/06/2004       Anesthesia Plan    ASA Status:  2   NPO Status:  NPO Appropriate    Anesthesia Type: MAC.   Induction: Intravenous.   Maintenance: TIVA.        Consents    Anesthesia Plan(s) and associated risks, benefits, and realistic alternatives discussed. Questions answered and patient/representative(s) expressed understanding.    - Discussed:     - Discussed with:  Patient         Postoperative Care    Pain management: Multi-modal analgesia, Oral pain medications, IV analgesics.   PONV prophylaxis: Ondansetron (or other 5HT-3), Background Propofol Infusion     Comments:                Sallie Enriquez MD

## 2022-07-19 NOTE — ANESTHESIA CARE TRANSFER NOTE
Patient: Lars Coleman    Procedure: Procedure(s):  EXCISION,of posterior NECK cyst       Diagnosis: Sebaceous cyst [L72.3]  Diagnosis Additional Information: No value filed.    Anesthesia Type:   MAC     Note:    Oropharynx: oropharynx clear of all foreign objects  Level of Consciousness: awake  Oxygen Supplementation: room air    Independent Airway: airway patency satisfactory and stable  Dentition: dentition unchanged  Vital Signs Stable: post-procedure vital signs reviewed and stable  Report to RN Given: handoff report given  Patient transferred to: Phase II    Handoff Report: Identifed the Patient, Identified the Reponsible Provider, Reviewed the pertinent medical history, Discussed the surgical course, Reviewed Intra-OP anesthesia mangement and issues during anesthesia, Set expectations for post-procedure period and Allowed opportunity for questions and acknowledgement of understanding      Vitals:  Vitals Value Taken Time   BP     Temp     Pulse     Resp     SpO2         Electronically Signed By: NGA Simmons CRNA  July 19, 2022  11:30 AM   PAIN/discharge

## 2022-07-19 NOTE — PROGRESS NOTES
SPIRITUAL HEALTH SERVICES Progress Note  [facility]  Ortho/Spine    Visited Lars Coleman per request for pre-surgical prayer.     Surgery was moved up so this author visited afterward.  Pt is [employer] and mentioned attending [facility] in [place].     Pt in good spirits and offered a prayer of thanksgiving.     No further spiritual/emotional  needs expressed.     DUNG Slater  Intern   Phone: [phone]

## 2022-07-19 NOTE — INTERVAL H&P NOTE
"I have reviewed the surgical (or preoperative) H&P that is linked to this encounter, and examined the patient. There are no significant changes    Clinical Conditions Present on Arrival:  Clinically Significant Risk Factors Present on Admission                   # DMII: A1C = N/A within past 3 months  # Overweight: Estimated body mass index is 28.87 kg/m  as calculated from the following:    Height as of this encounter: 1.803 m (5' 11\").    Weight as of this encounter: 93.9 kg (207 lb).       "

## 2022-07-20 ENCOUNTER — TELEPHONE (OUTPATIENT)
Dept: SURGERY | Facility: CLINIC | Age: 72
End: 2022-07-20

## 2022-07-20 LAB
PATH REPORT.COMMENTS IMP SPEC: NORMAL
PATH REPORT.COMMENTS IMP SPEC: NORMAL
PATH REPORT.FINAL DX SPEC: NORMAL
PATH REPORT.GROSS SPEC: NORMAL
PATH REPORT.MICROSCOPIC SPEC OTHER STN: NORMAL
PATH REPORT.RELEVANT HX SPEC: NORMAL
PHOTO IMAGE: NORMAL

## 2022-07-20 NOTE — TELEPHONE ENCOUNTER
I called patient with benign path results.      Instructed patient that he can remove dressing and shower after 48hours.  He does not need to recover incision if no drainage present.      He is appreciative of the information and will call if further questions or concerns.

## 2022-07-20 NOTE — TELEPHONE ENCOUNTER
Name of caller: Patient    Reason for Call:  Calling for his path results and when can he remove the dressing, says 24 hrs home care instruction says 48 hrs. He would like to keep it on for 48 since he's going to be around some dust.     Surgeon:  Dr. Eubanks    Recent Surgery:  Yes.    If yes, when & what type:  Excisional biopsy Posterior neck mass 7/19       Best phone number to reach pt at is: [phone]  Ok to leave a message with medical info? Yes.    Pharmacy preferred (if calling for a refill): N/A

## 2022-08-16 DIAGNOSIS — Z79.4 TYPE 2 DIABETES MELLITUS WITHOUT COMPLICATION, WITH LONG-TERM CURRENT USE OF INSULIN (H): ICD-10-CM

## 2022-08-16 DIAGNOSIS — E11.9 TYPE 2 DIABETES MELLITUS WITHOUT COMPLICATION, WITH LONG-TERM CURRENT USE OF INSULIN (H): ICD-10-CM

## 2022-08-18 RX ORDER — SEMAGLUTIDE 1.34 MG/ML
0.25 INJECTION, SOLUTION SUBCUTANEOUS
Refills: 11 | COMMUNITY
Start: 2022-08-18

## 2022-08-18 NOTE — TELEPHONE ENCOUNTER
Lars Coleman is requesting a refill of:    Refused Prescriptions:                       Disp   Refills    OZEMPIC, 0.25 OR 0.5 MG/DOSE, 2 MG/1.5ML S*       11       Sig: INJECT 0.25 MG SUBCUTANEOUS EVERY 7 DAYS  Refused By: SIVAN BARFIELD  Reason for Refusal: Should already have refills on file

## 2022-08-19 ENCOUNTER — OFFICE VISIT (OUTPATIENT)
Dept: FAMILY MEDICINE | Facility: CLINIC | Age: 72
End: 2022-08-19

## 2022-08-19 DIAGNOSIS — U07.1 SARS-COV-2 POSITIVE: Primary | ICD-10-CM

## 2022-08-19 PROCEDURE — 99214 OFFICE O/P EST MOD 30 MIN: CPT | Mod: GT | Performed by: FAMILY MEDICINE

## 2022-08-19 NOTE — NURSING NOTE
Chief Complaint   Patient presents with     Covid Concern     DOXIMITY  Tested positive for covid today  Wed am tested neg  Exposure to covid early this week to his wife who's pos  Tues his sx began- headache, weak, fatigue, coughing periodically         Pre-visit Screening:  Immunizations:  up to date  Colonoscopy:  is due and to be scheduled by patient for later completion  Mammogram: NA  Asthma Action Test/Plan:  NA  PHQ9:  NA  GAD7:  NA  Questioned patient about current smoking habits Pt. has never smoked.  Ok to leave detailed message on voice mail for today's visit only Yes, phone # [phone]

## 2022-08-19 NOTE — PROGRESS NOTES
"Assessment & Plan   Problem List Items Addressed This Visit    None     Visit Diagnoses     SARS-CoV-2 positive    -  Primary    Relevant Medications    nirmatrelvir and ritonavir (PAXLOVID) therapy pack         Video-Visit Details    Type of service:  Video Visit    Video Start Time (time video started): 12:31    Video End Time (time video stopped): 12:46    Originating Location (pt. Location): Other cabin    Distant Location (provider location):  [facility]     Mode of Communication:  Video Conference via [facility]  Physician has received verbal consent for a Video Visit from the patient? Yes      Keerthi Wu MD    1. SARS-CoV-2 positive  Discussed risks and benefits of paxlovid, he wants to take it. Including possible rebound. Continue to treat symptoms, check oxygen levels. If having a hard time breathing or other worsening symptoms, go to the ER. Old atorvastatin while on the paxlovid.  - nirmatrelvir and ritonavir (PAXLOVID) therapy pack; Take 3 tablets by mouth 2 times daily for 5 days  Dispense: 30 tablet; Refill: 0           BMI:   Estimated body mass index is 28.73 kg/m  as calculated from the following:    Height as of 7/19/22: 1.803 m (5' 11\").    Weight as of 7/19/22: 93.4 kg (206 lb).         FUTURE APPOINTMENTS:       - Follow-up visit as needed.    No follow-ups on file.    Keerthi Wu MD  [facility]    Subjective     Nursing Notes:   Jayne Vaughan  8/19/2022 12:06 PM  Signed  Chief Complaint   Patient presents with     Covid Concern     DOXIMITY  Tested positive for covid today  Wed am tested neg  Exposure to covid early this week to his wife who's pos  Tues his sx began- headache, weak, fatigue, coughing periodically         Pre-visit Screening:  Immunizations:  up to date  Colonoscopy:  is due and to be scheduled by patient for later completion  Mammogram: NA  Asthma Action Test/Plan:  NA  PHQ9:  NA  GAD7:  NA  Questioned patient about current " "smoking habits Pt. has never smoked.  Ok to leave detailed message on voice mail for today's visit only Yes, phone # [phone]             Lars Coleman is a 72 year old male who presents to clinic today for the following health issues   HPI     Video visit for covid. Wife tested positive couple days ago from her sister. First day of his sx Tuesday/wednesday. Tested positive this morning.  Sleeping a lot, fatigue, cough, fever this morning. Temp now is 99.   Wife got paxlovid and he wants it also. Breathing is ok. Not coughing a lot.  [place].             MASSBP Score 7/19/2022 8/19/2022   Age Greater than or equal to 65 years - 2   BMI greater than or equal to 35 kg/m2 - 0   Has Diabetes Mellitus - 2   Has Chronic Kidney Disease - 0   Has Cardiovascular Disease and 55 years or older - 0   Has Chronic Respiratory Disease and 55 years or older - 0   Has Hypertension and 55 years or older - 0   Is Immunocompromised - 0   Is Pregnant - 0   Member of BIP community (Black/, /, ,  or , or  or [place] Native)  - 0   MASSBP Score - 4   Has the patient had a positive COVID test outside our system?  No Yes   What day did symptoms start?  - 8/17/2022       Estimated body mass index is 28.73 kg/m  as calculated from the following:    Height as of 7/19/22: 1.803 m (5' 11\").    Weight as of 7/19/22: 93.4 kg (206 lb).     GFR Estimate   Date Value Ref Range Status   07/19/2022 >90 >60 mL/min/1.73m2 Final     Comment:     Effective December 21, 2021 eGFRcr in adults is calculated using the 2021 CKD-EPI creatinine equation which includes age and gender (Candy moffett al., NEJM, DOI: 10.1056/CUVWdq3266446)   01/11/2021 88 >60 mL/min/[1.73_m2] Final     Comment:     Non  GFR Calc  Starting 12/18/2018, serum creatinine based estimated GFR (eGFR) will be   calculated using the Chronic Kidney Disease Epidemiology " Collaboration   (CKD-EPI) equation.          FDA Facts Sheet  [employer] Drug Interaction review    YESMedications were reviewed with the patient and held or adjusted where applicable.    Current Outpatient Medications   Medication     atorvastatin (LIPITOR) 20 MG tablet     blood glucose (NO BRAND SPECIFIED) lancets standard     insulin glargine (LANTUS SOLOSTAR) 100 UNIT/ML pen     insulin pen needle (ULTICARE MINI) 31G X 6 MM miscellaneous     metFORMIN (GLUCOPHAGE-XR) 500 MG 24 hr tablet     nirmatrelvir and ritonavir (PAXLOVID) therapy pack     ONETOUCH ULTRA test strip     semaglutide (OZEMPIC, 0.25 OR 0.5 MG/DOSE,) 2 MG/1.5ML SOPN pen     Acetylcarnitine HCl (ACETYL-L-CARNITINE HCL) POWD     Ascorbic Acid (VITAMIN C PO)     Cholecalciferol (VITAMIN D PO)     coenzyme Q10 (CO-Q10) 30 MG capsule     Green Tea, Camillia sinensis, (GREEN TEA EXTRACT PO)     KRILL OIL PO     LITHIUM PO     MAGNESIUM PO     MILK THISTLE PO     Nutritional Supplements (NUTRITIONAL SUPPLEMENT PO)     TAURINE PO     TURMERIC PO     No current facility-administered medications for this visit.                                           Review of Systems   Constitutional, HEENT, cardiovascular, pulmonary, gi and gu systems are negative, except as otherwise noted.      Objective    There were no vitals taken for this visit.  There is no height or weight on file to calculate BMI.  Physical Exam   GENERAL: healthy, alert and no distress  MS: no gross musculoskeletal defects noted, no edema  NEURO: Normal strength and tone, mentation intact and speech normal  PSYCH: mentation appears normal, affect normal/bright  Video visit, limited

## 2022-10-16 ENCOUNTER — HEALTH MAINTENANCE LETTER (OUTPATIENT)
Age: 72
End: 2022-10-16

## 2022-10-17 DIAGNOSIS — Z79.4 TYPE 2 DIABETES MELLITUS WITHOUT COMPLICATION, WITH LONG-TERM CURRENT USE OF INSULIN (H): ICD-10-CM

## 2022-10-17 DIAGNOSIS — E11.9 TYPE 2 DIABETES MELLITUS WITHOUT COMPLICATION, WITH LONG-TERM CURRENT USE OF INSULIN (H): ICD-10-CM

## 2022-10-17 RX ORDER — INSULIN GLARGINE 100 [IU]/ML
43 INJECTION, SOLUTION SUBCUTANEOUS AT BEDTIME
COMMUNITY
Start: 2022-10-17

## 2022-10-17 NOTE — TELEPHONE ENCOUNTER
Lars Coleman is requesting a refill of:    Refused Prescriptions:                       Disp   Refills    LANTUS SOLOSTAR 100 UNIT/ML soln [Pharmacy*                Sig: INJECT 43 UNITS SUBCUTANEOUS AT BEDTIME  Refused By: SIVAN BARFIELD  Reason for Refusal: Patient needs appointment    Needs OV for refills

## 2022-10-28 ENCOUNTER — TRANSFERRED RECORDS (OUTPATIENT)
Dept: FAMILY MEDICINE | Facility: CLINIC | Age: 72
End: 2022-10-28

## 2022-10-31 ENCOUNTER — ALLIED HEALTH/NURSE VISIT (OUTPATIENT)
Dept: FAMILY MEDICINE | Facility: CLINIC | Age: 72
End: 2022-10-31

## 2022-10-31 DIAGNOSIS — Z23 NEED FOR VACCINATION: Primary | ICD-10-CM

## 2022-10-31 PROCEDURE — G0008 ADMIN INFLUENZA VIRUS VAC: HCPCS | Performed by: FAMILY MEDICINE

## 2022-10-31 PROCEDURE — 90662 IIV NO PRSV INCREASED AG IM: CPT | Performed by: FAMILY MEDICINE

## 2022-11-10 DIAGNOSIS — Z79.4 TYPE 2 DIABETES MELLITUS WITHOUT COMPLICATION, WITH LONG-TERM CURRENT USE OF INSULIN (H): ICD-10-CM

## 2022-11-10 DIAGNOSIS — E11.9 TYPE 2 DIABETES MELLITUS WITHOUT COMPLICATION, WITH LONG-TERM CURRENT USE OF INSULIN (H): ICD-10-CM

## 2022-11-10 DIAGNOSIS — E78.2 MIXED HYPERLIPIDEMIA: ICD-10-CM

## 2022-11-10 RX ORDER — INSULIN GLARGINE 100 [IU]/ML
43 INJECTION, SOLUTION SUBCUTANEOUS AT BEDTIME
Qty: 15 ML | Refills: 0 | Status: SHIPPED | OUTPATIENT
Start: 2022-11-10 | End: 2022-11-18

## 2022-11-10 RX ORDER — FLURBIPROFEN SODIUM 0.3 MG/ML
SOLUTION/ DROPS OPHTHALMIC DAILY
Qty: 100 EACH | Refills: 1 | Status: SHIPPED | OUTPATIENT
Start: 2022-11-10 | End: 2022-11-18

## 2022-11-10 RX ORDER — ATORVASTATIN CALCIUM 20 MG/1
10 TABLET, FILM COATED ORAL DAILY
Qty: 15 TABLET | Refills: 0 | Status: SHIPPED | OUTPATIENT
Start: 2022-11-10 | End: 2022-11-18

## 2022-11-10 RX ORDER — METFORMIN HCL 500 MG
2000 TABLET, EXTENDED RELEASE 24 HR ORAL
Qty: 120 TABLET | Refills: 0 | Status: SHIPPED | OUTPATIENT
Start: 2022-11-10 | End: 2022-11-18

## 2022-11-18 DIAGNOSIS — E11.9 TYPE 2 DIABETES MELLITUS WITHOUT COMPLICATION, WITH LONG-TERM CURRENT USE OF INSULIN (H): ICD-10-CM

## 2022-11-18 DIAGNOSIS — Z79.4 TYPE 2 DIABETES MELLITUS WITHOUT COMPLICATION, WITH LONG-TERM CURRENT USE OF INSULIN (H): ICD-10-CM

## 2022-11-18 RX ORDER — METFORMIN HCL 500 MG
2000 TABLET, EXTENDED RELEASE 24 HR ORAL
Qty: 120 TABLET | Refills: 0 | Status: SHIPPED | OUTPATIENT
Start: 2022-11-18 | End: 2022-12-07

## 2022-11-18 RX ORDER — FLURBIPROFEN SODIUM 0.3 MG/ML
SOLUTION/ DROPS OPHTHALMIC DAILY
Qty: 100 EACH | Refills: 1 | Status: SHIPPED | OUTPATIENT
Start: 2022-11-18 | End: 2024-01-25

## 2022-11-18 RX ORDER — INSULIN GLARGINE 100 [IU]/ML
43 INJECTION, SOLUTION SUBCUTANEOUS AT BEDTIME
Qty: 15 ML | Refills: 0 | Status: SHIPPED | OUTPATIENT
Start: 2022-11-18 | End: 2022-12-07

## 2022-11-18 RX ORDER — ATORVASTATIN CALCIUM 20 MG/1
10 TABLET, FILM COATED ORAL DAILY
Qty: 15 TABLET | Refills: 0 | Status: SHIPPED | OUTPATIENT
Start: 2022-11-18 | End: 2022-12-07

## 2022-11-18 NOTE — TELEPHONE ENCOUNTER
Patient called into the CS line asking for these medications to be resent [facility] [place] [address] because it is to expensive at [facility]. Routing to Dr. Wu due to Dr. Pena being out of office, can you resend these in for the patient?    Pending Prescriptions:                       Disp   Refills    insulin pen needle (ULTICARE MINI) 31G X *100 ea*1            Sig: daily    insulin glargine (LANTUS SOLOSTAR) 100 UN*15 mL  0            Sig: Inject 43 Units Subcutaneous At Bedtime    atorvastatin (LIPITOR) 20 MG tablet       15 tab*0            Sig: Take 0.5 tablets (10 mg) by mouth daily    metFORMIN (GLUCOPHAGE XR) 500 MG 24 hr ta*120 ta*0            Sig: Take 4 tablets (2,000 mg) by mouth daily (with           dinner)    Signed Prescriptions:                        Disp   Refills    insulin pen needle (ULTICARE MINI) 31G X 6*100 ea*1        Sig: daily  Authorizing Provider: DAVID PENA    insulin glargine (LANTUS SOLOSTAR) 100 UNI*15 mL  0        Sig: Inject 43 Units Subcutaneous At Bedtime  Authorizing Provider: DAVID PENA    atorvastatin (LIPITOR) 20 MG tablet        15 tab*0        Sig: Take 0.5 tablets (10 mg) by mouth daily  Authorizing Provider: DAVID PENA    metFORMIN (GLUCOPHAGE XR) 500 MG 24 hr tab*120 ta*0        Sig: Take 4 tablets (2,000 mg) by mouth daily (with           dinner)  Authorizing Provider: DAVID PENA

## 2022-11-21 RX ORDER — INSULIN GLARGINE 100 [IU]/ML
43 INJECTION, SOLUTION SUBCUTANEOUS AT BEDTIME
COMMUNITY
Start: 2022-11-21

## 2022-11-21 NOTE — TELEPHONE ENCOUNTER
Lars Coleman is requesting a refill of:    Refused Prescriptions:                       Disp   Refills    LANTUS SOLOSTAR 100 UNIT/ML soln [Pharmacy*                Sig: INJECT 43 UNITS SUBCUTANEOUS AT BEDTIME  Refused By: SIVAN BARFIELD  Reason for Refusal: Should already have refills on file

## 2022-12-07 ENCOUNTER — OFFICE VISIT (OUTPATIENT)
Dept: FAMILY MEDICINE | Facility: CLINIC | Age: 72
End: 2022-12-07

## 2022-12-07 VITALS
TEMPERATURE: 97 F | DIASTOLIC BLOOD PRESSURE: 70 MMHG | SYSTOLIC BLOOD PRESSURE: 112 MMHG | RESPIRATION RATE: 20 BRPM | BODY MASS INDEX: 28.98 KG/M2 | HEART RATE: 88 BPM | HEIGHT: 71 IN | WEIGHT: 207 LBS

## 2022-12-07 DIAGNOSIS — E78.2 MIXED HYPERLIPIDEMIA: ICD-10-CM

## 2022-12-07 DIAGNOSIS — M65.30 TRIGGER FINGER, ACQUIRED: ICD-10-CM

## 2022-12-07 DIAGNOSIS — Z23 NEED FOR PNEUMOCOCCAL VACCINATION: ICD-10-CM

## 2022-12-07 DIAGNOSIS — Z79.4 TYPE 2 DIABETES MELLITUS WITHOUT COMPLICATION, WITH LONG-TERM CURRENT USE OF INSULIN (H): Primary | ICD-10-CM

## 2022-12-07 DIAGNOSIS — E11.9 TYPE 2 DIABETES MELLITUS WITHOUT COMPLICATION, WITH LONG-TERM CURRENT USE OF INSULIN (H): Primary | ICD-10-CM

## 2022-12-07 LAB
ALBUMIN (URINE) MG/L: 10
ALBUMIN SERPL-MCNC: 4.5 G/DL (ref 3.6–5.1)
ALBUMIN URINE MG/G CR: <30 MG/G CREATININE
ALBUMIN/GLOB SERPL: 1.5 {RATIO} (ref 1–2.5)
ALP SERPL-CCNC: 65 U/L (ref 33–130)
ALT 1742-6: 46 U/L (ref 0–32)
AST 1920-8: 36 U/L (ref 0–35)
BILIRUB SERPL-MCNC: 0.9 MG/DL (ref 0.2–1.2)
BUN SERPL-MCNC: 15 MG/DL (ref 7–25)
BUN/CREATININE RATIO: 15.6 (ref 6–22)
CALCIUM SERPL-MCNC: 9.7 MG/DL (ref 8.6–10.3)
CHLORIDE SERPLBLD-SCNC: 104 MMOL/L (ref 98–110)
CHOLEST SERPL-MCNC: 205 MG/DL (ref 0–199)
CHOLEST/HDLC SERPL: 6 {RATIO} (ref 0–5)
CO2 SERPL-SCNC: 30.4 MMOL/L (ref 20–32)
CREAT SERPL-MCNC: 0.96 MG/DL (ref 0.6–1.3)
CREATININE URINE MG/DL: 200 MG/DL
GLOBULIN, CALCULATED - QUEST: 3.1 (ref 1.9–3.7)
GLUCOSE SERPL-MCNC: 149 MG/DL (ref 60–99)
HBA1C MFR BLD: 9.8 % (ref 4–7)
HDLC SERPL-MCNC: 35 MG/DL (ref 40–150)
LDLC SERPL CALC-MCNC: 119 MG/DL (ref 0–130)
POTASSIUM SERPL-SCNC: 4.94 MMOL/L (ref 3.5–5.3)
PROT SERPL-MCNC: 7.6 G/DL (ref 6.1–8.1)
SODIUM SERPL-SCNC: 140.7 MMOL/L (ref 135–146)
TRIGL SERPL-MCNC: 254 MG/DL (ref 0–149)

## 2022-12-07 PROCEDURE — 80061 LIPID PANEL: CPT | Performed by: FAMILY MEDICINE

## 2022-12-07 PROCEDURE — 99214 OFFICE O/P EST MOD 30 MIN: CPT | Mod: 25 | Performed by: FAMILY MEDICINE

## 2022-12-07 PROCEDURE — 36415 COLL VENOUS BLD VENIPUNCTURE: CPT | Performed by: FAMILY MEDICINE

## 2022-12-07 PROCEDURE — 82043 UR ALBUMIN QUANTITATIVE: CPT | Performed by: FAMILY MEDICINE

## 2022-12-07 PROCEDURE — G0009 ADMIN PNEUMOCOCCAL VACCINE: HCPCS | Performed by: FAMILY MEDICINE

## 2022-12-07 PROCEDURE — 90677 PCV20 VACCINE IM: CPT | Performed by: FAMILY MEDICINE

## 2022-12-07 PROCEDURE — 82570 ASSAY OF URINE CREATININE: CPT | Performed by: FAMILY MEDICINE

## 2022-12-07 PROCEDURE — 80053 COMPREHEN METABOLIC PANEL: CPT | Performed by: FAMILY MEDICINE

## 2022-12-07 PROCEDURE — 83036 HEMOGLOBIN GLYCOSYLATED A1C: CPT | Performed by: FAMILY MEDICINE

## 2022-12-07 RX ORDER — METFORMIN HCL 500 MG
2000 TABLET, EXTENDED RELEASE 24 HR ORAL
Qty: 360 TABLET | Refills: 1 | Status: SHIPPED | OUTPATIENT
Start: 2022-12-07 | End: 2023-04-19

## 2022-12-07 RX ORDER — ATORVASTATIN CALCIUM 20 MG/1
10 TABLET, FILM COATED ORAL DAILY
Qty: 45 TABLET | Refills: 1 | Status: SHIPPED | OUTPATIENT
Start: 2022-12-07 | End: 2023-04-19

## 2022-12-07 RX ORDER — INSULIN GLARGINE 100 [IU]/ML
43 INJECTION, SOLUTION SUBCUTANEOUS AT BEDTIME
Qty: 45 ML | Refills: 1 | Status: SHIPPED | OUTPATIENT
Start: 2022-12-07 | End: 2023-04-19

## 2022-12-07 NOTE — NURSING NOTE
Lars Coleman is here for a diabetic check and medication refill.    Questioned patient about current smoking habits.  Pt. has never smoked.  Body mass index is 28.87 kg/m .  PULSE regular  My Chart: active  CLASSIFICATION OF OVERWEIGHT AND OBESITY BY BMI                        Obesity Class           BMI(kg/m2)  Underweight                                    < 18.5  Normal                                         18.5-24.9  Overweight                                     25.0-29.9  OBESITY                     I                  30.0-34.9                             II                 35.0-39.9  EXTREME OBESITY             III                >40                            Patient's  BMI Body mass index is 28.87 kg/m .  http://hin.nhlbi.nih.gov/menuplanner/menu.cgi    Pre-visit planning  Immunizations - up to date  Colonoscopy - is up to date  Mammogram -   Asthma -   PHQ9 -    ANYA-7 -      The patient has verbalized that it is ok to leave a detailed voice message on the patient's cell phone with results/recommendations from this visit.

## 2022-12-07 NOTE — PROGRESS NOTES
"  Assessment & Plan     Type 2 diabetes mellitus without complication, with long-term current use of insulin (H)  slight improvement from last check, likely would ne much improved with ozempic but not taking    Pt will be meeting with MTM tomorrow to discuss    Continue metformin and Lantus for now  - HEMOGLOBIN A1C (BFP)  - VENOUS COLLECTION    Mixed hyperlipidemia  Control uncertain, continue current medications at current doses pending labs    Trigger finger, acquired  stable      Review of the result(s) of each unique test - labs  Ordering of each unique test  Prescription drug management         BMI:   Estimated body mass index is 28.87 kg/m  as calculated from the following:    Height as of this encounter: 1.803 m (5' 11\").    Weight as of this encounter: 93.9 kg (207 lb).       FUTURE APPOINTMENTS:       - Follow-up visit in 3-6 mo, MTM tomorrow  Regular exercise    No follow-ups on file.    Pop Pena MD  [facility]    Sean Sousa is a 72 year old, presenting for the following health issues:  Recheck Medication      HPI     Diabetes Follow-up-insulin 43 unit(s), ozempic used in July but not after that due to costs-did raheem to help weight and sugars, using metformin    How often are you checking your blood sugar? One time daily  What time of day are you checking your blood sugars (select all that apply)?  Before meals  Have you had any blood sugars above 200?  Yes   Have you had any blood sugars below 70?  No    What symptoms do you notice when your blood sugar is low?  Shaky    What concerns do you have today about your diabetes? Blood sugar is often over 200     Do you have any of these symptoms? (Select all that apply)  No numbness or tingling in feet.  No redness, sores or blisters on feet.  No complaints of excessive thirst.  No reports of blurry vision.  No significant changes to weight.    Have you had a diabetic eye exam in the last 12 months? He thinks yes bu " "will call eye doctor        BP Readings from Last 2 Encounters:   12/07/22 112/70   07/19/22 121/71     Hemoglobin A1C (%)   Date Value   07/06/2022 10.1 (A)   05/03/2022 8.9 (A)     LDL Cholesterol Calculated (mg/dL (calc))   Date Value   02/07/2019 99   02/27/2018 76     LDL Cholesterol Direct (mg/dL)   Date Value   05/03/2022 77   11/22/2021 99               Hyperlipidemia Follow-Up      Are you regularly taking any medication or supplement to lower your cholesterol?   Yes- atorvastatin    Are you having muscle aches or other side effects that you think could be caused by your cholesterol lowering medication?  No        How many servings of fruits and vegetables do you eat daily?  2-3    On average, how many sweetened beverages do you drink each day (Examples: soda, juice, sweet tea, etc.  Do NOT count diet or artificially sweetened beverages)?   1    How many days per week do you exercise enough to make your heart beat faster? 3 or less    How many minutes a day do you exercise enough to make your heart beat faster? 20 - 29    How many days per week do you miss taking your medication? 0        Review of Systems   Constitutional, HEENT, cardiovascular, pulmonary, gi and gu systems are negative, except as otherwise noted.      Objective    /70 (BP Location: Right arm, Patient Position: Chair, Cuff Size: Adult Large)   Pulse 88   Temp 97  F (36.1  C) (Temporal)   Resp 20   Ht 1.803 m (5' 11\")   Wt 93.9 kg (207 lb)   BMI 28.87 kg/m    Body mass index is 28.87 kg/m .  Physical Exam   GENERAL: healthy, alert and no distress  NECK: no adenopathy, no asymmetry, masses, or scars and thyroid normal to palpation  RESP: lungs clear to auscultation - no rales, rhonchi or wheezes  CV: regular rate and rhythm, normal S1 S2, no S3 or S4, no murmur, click or rub, no peripheral edema and peripheral pulses strong  ABDOMEN: soft, nontender, no hepatosplenomegaly, no masses and bowel sounds normal  MS: no gross " musculoskeletal defects noted, no edema  PSYCH: mentation appears normal, affect normal/bright    Results for orders placed or performed in visit on 12/07/22 (from the past 24 hour(s))   HEMOGLOBIN A1C (BFP)   Result Value Ref Range    Hemoglobin A1C 9.8 (A) 4.0 - 7.0 %

## 2022-12-08 ENCOUNTER — OFFICE VISIT (OUTPATIENT)
Dept: FAMILY MEDICINE | Facility: CLINIC | Age: 72
End: 2022-12-08

## 2022-12-08 DIAGNOSIS — E11.9 TYPE 2 DIABETES MELLITUS WITHOUT COMPLICATION, WITH LONG-TERM CURRENT USE OF INSULIN (H): Primary | ICD-10-CM

## 2022-12-08 DIAGNOSIS — Z79.4 TYPE 2 DIABETES MELLITUS WITHOUT COMPLICATION, WITH LONG-TERM CURRENT USE OF INSULIN (H): Primary | ICD-10-CM

## 2022-12-08 RX ORDER — TIRZEPATIDE 2.5 MG/.5ML
2.5 INJECTION, SOLUTION SUBCUTANEOUS
Qty: 2 ML | Refills: 0 | Status: SHIPPED | OUTPATIENT
Start: 2022-12-08 | End: 2023-02-23

## 2022-12-08 NOTE — PROGRESS NOTES
SUBJECTIVE/OBJECTIVE:                Lars Coleman is a 72 year old male seen for a follow-up visit for Medication Management Services.  He was referred to me from Dr. Pop Pena.     Chief Complaint: Follow up from [facility] visit on 07/14/2022.      Diabetes:  Current Medications:  Current Outpatient Medications   Medication     Tirzepatide (MOUNJARO) 2.5 MG/0.5ML SOPN     Acetylcarnitine HCl (ACETYL-L-CARNITINE HCL) POWD     Ascorbic Acid (VITAMIN C PO)     atorvastatin (LIPITOR) 20 MG tablet     blood glucose (NO BRAND SPECIFIED) lancets standard     Cholecalciferol (VITAMIN D PO)     coenzyme Q10 (CO-Q10) 30 MG capsule     Green Tea, Camillia sinensis, (GREEN TEA EXTRACT PO)     insulin glargine (LANTUS SOLOSTAR) 100 UNIT/ML pen     insulin pen needle (ULTICARE MINI) 31G X 6 MM miscellaneous     KRILL OIL PO     LITHIUM PO     MAGNESIUM PO     metFORMIN (GLUCOPHAGE XR) 500 MG 24 hr tablet     MILK THISTLE PO     Nutritional Supplements (NUTRITIONAL SUPPLEMENT PO)     ONETOUCH ULTRA test strip     TAURINE PO     TURMERIC PO     No current facility-administered medications for this visit.       We discussed the benefits and risks of each medication.  Patient Questions/Concerns:  Patient is interested in reinitiating GLP-1 RA.   Labs:  Lab Results   Component Value Date    A1C 9.8 12/07/2022    A1C 10.1 07/06/2022    A1C 8.9 05/03/2022    A1C 8.5 11/22/2021    A1C 8.0 03/30/2021       Additional Information:  Patient had stopped Ozempic due to cost.     ASSESSMENT/PLAN:                Diabetes:  Assessment: Patient has had slight improvement with diabetic control, however discontinued Ozempic a couple of months ago due to cost.     Patient is interested in reinitiating GLP-1 RA or like medication. Discussed different options in this class of medication, and also discussed Mounjaro. It appears Mounjaro is covered on his plan and likely will further decrease A1c with weight loss effect from medication as  well.      Status: Diabetes control not at goal. Improved slightly.    Drug Therapy Problems:  1) Cost of Ozempic leading to noncompliance.    Plan:  1) Initiate Mounjaro at 2.5mg weekly. Patient will follow up in about 4 weeks and we will determine if taper is appropriate. I would like to see patient back in clinic in 3 months.      I spent 30 minutes with this patient today.  All changes were made via collaborative practice agreement with Pop Pena. A copy of the visit note was provided to the patient's primary care provider.    Moon Toney, PharmD  Clinical Pharmacist  [facility] Phone: [phone]  Direct Office Phone: [phone]

## 2023-02-14 DIAGNOSIS — E11.9 TYPE 2 DIABETES MELLITUS WITHOUT COMPLICATION, WITH LONG-TERM CURRENT USE OF INSULIN (H): ICD-10-CM

## 2023-02-14 DIAGNOSIS — Z79.4 TYPE 2 DIABETES MELLITUS WITHOUT COMPLICATION, WITH LONG-TERM CURRENT USE OF INSULIN (H): ICD-10-CM

## 2023-02-14 RX ORDER — BLOOD SUGAR DIAGNOSTIC
STRIP MISCELLANEOUS
Qty: 200 STRIP | Refills: 3 | Status: SHIPPED | OUTPATIENT
Start: 2023-02-14 | End: 2024-08-20

## 2023-02-14 NOTE — TELEPHONE ENCOUNTER
Pt last seen 12/07/22.    Lars Coleman is requesting a refill of:    Pending Prescriptions:                       Disp   Refills    ONETOUCH ULTRA test strip [Pharmacy Med N*[address]*3            Sig: TEST BLOOD SUGAR 4 TIMES DAILY

## 2023-02-23 ENCOUNTER — TELEPHONE (OUTPATIENT)
Dept: FAMILY MEDICINE | Facility: CLINIC | Age: 73
End: 2023-02-23

## 2023-02-23 DIAGNOSIS — E11.9 TYPE 2 DIABETES MELLITUS WITHOUT COMPLICATION, WITH LONG-TERM CURRENT USE OF INSULIN (H): Primary | ICD-10-CM

## 2023-02-23 DIAGNOSIS — Z79.4 TYPE 2 DIABETES MELLITUS WITHOUT COMPLICATION, WITH LONG-TERM CURRENT USE OF INSULIN (H): Primary | ICD-10-CM

## 2023-02-23 RX ORDER — SEMAGLUTIDE 1.34 MG/ML
0.5 INJECTION, SOLUTION SUBCUTANEOUS
Qty: 1.5 ML | Refills: 0 | Status: SHIPPED | OUTPATIENT
Start: 2023-02-23 | End: 2023-04-03

## 2023-02-23 NOTE — TELEPHONE ENCOUNTER
Patient reached out as insurance is not covering Mounjaro. Requesting refill of Ozempic be sent to [facility] [address] [facility] [address]. Patient is due for a recheck next month. Will discuss further tapering at that time.    Moon Toney, PharmD  Clinical Pharmacist  [facility]  General Clinic Phone: [phone]  Direct Office Phone: [phone]

## 2023-03-23 DIAGNOSIS — Z79.4 TYPE 2 DIABETES MELLITUS WITHOUT COMPLICATION, WITH LONG-TERM CURRENT USE OF INSULIN (H): ICD-10-CM

## 2023-03-23 DIAGNOSIS — E11.9 TYPE 2 DIABETES MELLITUS WITHOUT COMPLICATION, WITH LONG-TERM CURRENT USE OF INSULIN (H): ICD-10-CM

## 2023-03-23 RX ORDER — SEMAGLUTIDE 1.34 MG/ML
0.5 INJECTION, SOLUTION SUBCUTANEOUS
COMMUNITY
Start: 2023-03-23

## 2023-03-23 NOTE — TELEPHONE ENCOUNTER
Lars Coleman is requesting a refill of:    Refused Prescriptions:                       Disp   Refills    OZEMPIC, 0.25 OR 0.5 MG/DOSE, 2 MG/1.5ML S*                Sig: INJECT 0.5 MG SUBCUTANEOUS EVERY 7 DAYS  Refused By: MARVIN BRUCE  Reason for Refusal: Patient needs appointment    Pt due for OV

## 2023-04-01 ENCOUNTER — HEALTH MAINTENANCE LETTER (OUTPATIENT)
Age: 73
End: 2023-04-01

## 2023-04-03 ENCOUNTER — TELEPHONE (OUTPATIENT)
Dept: FAMILY MEDICINE | Facility: CLINIC | Age: 73
End: 2023-04-03

## 2023-04-03 DIAGNOSIS — E11.9 TYPE 2 DIABETES MELLITUS WITHOUT COMPLICATION, WITH LONG-TERM CURRENT USE OF INSULIN (H): ICD-10-CM

## 2023-04-03 DIAGNOSIS — Z79.4 TYPE 2 DIABETES MELLITUS WITHOUT COMPLICATION, WITH LONG-TERM CURRENT USE OF INSULIN (H): ICD-10-CM

## 2023-04-03 RX ORDER — SEMAGLUTIDE 1.34 MG/ML
0.5 INJECTION, SOLUTION SUBCUTANEOUS
Qty: 1.5 ML | Refills: 0 | Status: SHIPPED | OUTPATIENT
Start: 2023-04-03 | End: 2023-04-19

## 2023-04-03 NOTE — TELEPHONE ENCOUNTER
Patient has office visit scheduled with Dr. Pena 04/19/2023. Sending in one month supply of Ozempic to get patient through til appt.    Moon Toney, PharmD  Clinical Pharmacist  [facility]  General Clinic Phone: [phone]  Direct Office Phone: [phone]

## 2023-04-19 ENCOUNTER — OFFICE VISIT (OUTPATIENT)
Dept: FAMILY MEDICINE | Facility: CLINIC | Age: 73
End: 2023-04-19

## 2023-04-19 VITALS
RESPIRATION RATE: 20 BRPM | HEIGHT: 71 IN | SYSTOLIC BLOOD PRESSURE: 128 MMHG | TEMPERATURE: 97 F | DIASTOLIC BLOOD PRESSURE: 64 MMHG | BODY MASS INDEX: 28.45 KG/M2 | WEIGHT: 203.2 LBS | HEART RATE: 80 BPM

## 2023-04-19 DIAGNOSIS — Z00.00 ENCOUNTER FOR MEDICARE ANNUAL WELLNESS EXAM: ICD-10-CM

## 2023-04-19 DIAGNOSIS — E78.2 MIXED HYPERLIPIDEMIA: ICD-10-CM

## 2023-04-19 DIAGNOSIS — E11.9 TYPE 2 DIABETES MELLITUS WITHOUT COMPLICATION, WITH LONG-TERM CURRENT USE OF INSULIN (H): ICD-10-CM

## 2023-04-19 DIAGNOSIS — E11.9 TYPE 2 DIABETES MELLITUS WITHOUT COMPLICATION, WITH LONG-TERM CURRENT USE OF INSULIN (H): Primary | ICD-10-CM

## 2023-04-19 DIAGNOSIS — Z79.4 TYPE 2 DIABETES MELLITUS WITHOUT COMPLICATION, WITH LONG-TERM CURRENT USE OF INSULIN (H): ICD-10-CM

## 2023-04-19 DIAGNOSIS — Z12.5 SPECIAL SCREENING FOR MALIGNANT NEOPLASM OF PROSTATE: ICD-10-CM

## 2023-04-19 DIAGNOSIS — Z79.4 TYPE 2 DIABETES MELLITUS WITHOUT COMPLICATION, WITH LONG-TERM CURRENT USE OF INSULIN (H): Primary | ICD-10-CM

## 2023-04-19 LAB
ALBUMIN SERPL-MCNC: 4.6 G/DL (ref 3.6–5.1)
ALBUMIN/GLOB SERPL: 1.8 {RATIO} (ref 1–2.5)
ALP SERPL-CCNC: 64 U/L (ref 33–130)
ALT 1742-6: 17 U/L (ref 0–32)
AST 1920-8: 14 U/L (ref 0–35)
BILIRUB SERPL-MCNC: 0.6 MG/DL (ref 0.2–1.2)
BUN SERPL-MCNC: 19 MG/DL (ref 7–25)
BUN/CREATININE RATIO: 22.1 (ref 6–30)
CALCIUM SERPL-MCNC: 9.6 MG/DL (ref 8.6–10.3)
CHLORIDE SERPLBLD-SCNC: 103 MMOL/L (ref 98–110)
CHOLEST SERPL-MCNC: 141 MG/DL (ref 0–199)
CHOLEST/HDLC SERPL: 4 {RATIO} (ref 0–5)
CO2 SERPL-SCNC: 29.5 MMOL/L (ref 20–32)
CREAT SERPL-MCNC: 0.86 MG/DL (ref 0.6–1.3)
GLOBULIN, CALCULATED - QUEST: 2.5 (ref 1.9–3.7)
GLUCOSE SERPL-MCNC: 162 MG/DL (ref 60–99)
HBA1C MFR BLD: 8.1 % (ref 4–7)
HDLC SERPL-MCNC: 40 MG/DL (ref 40–150)
LDLC SERPL CALC-MCNC: 70 MG/DL (ref 0–130)
POTASSIUM SERPL-SCNC: 5.17 MMOL/L (ref 3.5–5.3)
PROT SERPL-MCNC: 7.1 G/DL (ref 6.1–8.1)
SODIUM SERPL-SCNC: 140.4 MMOL/L (ref 135–146)
TRIGL SERPL-MCNC: 154 MG/DL (ref 0–149)

## 2023-04-19 PROCEDURE — 99214 OFFICE O/P EST MOD 30 MIN: CPT | Mod: 25 | Performed by: FAMILY MEDICINE

## 2023-04-19 PROCEDURE — 83036 HEMOGLOBIN GLYCOSYLATED A1C: CPT | Performed by: FAMILY MEDICINE

## 2023-04-19 PROCEDURE — G0438 PPPS, INITIAL VISIT: HCPCS | Performed by: FAMILY MEDICINE

## 2023-04-19 PROCEDURE — 80061 LIPID PANEL: CPT | Performed by: FAMILY MEDICINE

## 2023-04-19 PROCEDURE — 80053 COMPREHEN METABOLIC PANEL: CPT | Performed by: FAMILY MEDICINE

## 2023-04-19 PROCEDURE — 36415 COLL VENOUS BLD VENIPUNCTURE: CPT | Performed by: FAMILY MEDICINE

## 2023-04-19 RX ORDER — SEMAGLUTIDE 1.34 MG/ML
0.5 INJECTION, SOLUTION SUBCUTANEOUS
Qty: 4.5 ML | Refills: 1 | Status: SHIPPED | OUTPATIENT
Start: 2023-04-19 | End: 2023-04-19

## 2023-04-19 RX ORDER — METFORMIN HCL 500 MG
2000 TABLET, EXTENDED RELEASE 24 HR ORAL
Qty: 360 TABLET | Refills: 1 | Status: SHIPPED | OUTPATIENT
Start: 2023-04-19 | End: 2024-01-25

## 2023-04-19 RX ORDER — INSULIN GLARGINE 100 [IU]/ML
43 INJECTION, SOLUTION SUBCUTANEOUS AT BEDTIME
Qty: 45 ML | Refills: 1 | Status: SHIPPED | OUTPATIENT
Start: 2023-04-19 | End: 2024-01-25

## 2023-04-19 RX ORDER — ATORVASTATIN CALCIUM 20 MG/1
10 TABLET, FILM COATED ORAL DAILY
Qty: 45 TABLET | Refills: 1 | Status: SHIPPED | OUTPATIENT
Start: 2023-04-19 | End: 2024-01-25

## 2023-04-19 NOTE — PATIENT INSTRUCTIONS
Patient Education   Personalized Prevention Plan  You are due for the preventive services outlined below.  Your care team is available to assist you in scheduling these services.  If you have already completed any of these items, please share that information with your care team to update in your medical record.  Health Maintenance Due   Topic Date Due     Annual Wellness Visit  12/08/2021     Eye Exam  04/05/2022     Diptheria Tetanus Pertussis (DTAP/TDAP/TD) Vaccine (2 - Td or Tdap) 11/28/2022     PHQ-2 (once per calendar year)  01/01/2023     Diabetic Foot Exam  05/03/2023

## 2023-04-19 NOTE — TELEPHONE ENCOUNTER
Lars Coleman is requesting a refill of:    Pending Prescriptions:                       Disp   Refills    semaglutide (OZEMPIC (0.25 OR 0.5 MG/DOSE*3 mL   1            Sig: Inject 0.5 mg Subcutaneous every 7 days    Needs 3mLs sent  Please close encounter if RX was sent. Thanks, Clara

## 2023-04-19 NOTE — NURSING NOTE
Lars Coleman is here for fasting blood work, medication refill and Wellness visit. Has eye appointment next week  Questioned patient about current smoking habits.  Pt. has never smoked.  Body mass index is 33.67 kg/(m^2).  PULSE regular  My Chart: active    Pre-visit planning  Immunizations - up to date  Colonoscopy - is up to date  Mammogram -   Asthma -   PHQ9  ANYA-7    The patient has verbalized that it is ok to leave a detailed voice message on the patient's cell phone with results/recommendations from this visit.

## 2023-04-19 NOTE — PROGRESS NOTES
Lars Coleman is a 72 year old male who presents for Medicare Annual Wellness Visit.    Current providers caring for this patient include:  Patient Care Team:  Pop Pena MD as PCP - General (Family Practice)  Pop Pena MD as Assigned PCP  Stas Solano as Pharmacist  Jacey Eubanks MD as Assigned Surgical Provider  Carole Toney RPH as Assigned MTM Pharmacist    Complete Medical and Social history reviewed with patient, outlined below.    Patient Active Problem List   Diagnosis     Mixed hyperlipidemia     Family history of malignant neoplasm of gastrointestinal tract     Calculus of kidney     Special screening for malignant neoplasm of prostate     Health Care Home     Type 2 diabetes mellitus without complication, with long-term current use of insulin (H)     Tinnitus, bilateral     Tremor     Urinary urgency     Persistent insomnia     ACP (advance care planning)       Past Medical History:   Diagnosis Date     Kidney stone      Mixed hyperlipidemia      Pancreatic lesion 11/2020    Noted in ED, needs follow up.     Squamous cell carcinoma of skin of face      Type 2 diabetes mellitus        Past Surgical History:   Procedure Laterality Date     APPENDECTOMY       ESOPHAGOSCOPY, GASTROSCOPY, DUODENOSCOPY (EGD), COMBINED N/A 1/11/2021    Procedure: ESOPHAGOGASTRODUODENOSCOPY, WITH FINE NEEDLE ASPIRATION BIOPSY, WITH ENDOSCOPIC ULTRASOUND GUIDANCE;  Surgeon: Clara Tracy MD;  Location: [facility] OR     EXCISE MASS NECK N/A 7/19/2022    Procedure: Excisional biopsy posterior neck mass;  Surgeon: Jacey Eubanks MD;  Location: [facility] OR     VASECTOMY         Family History   Problem Relation Age of Onset     Cancer - colorectal Father         rectal cancer     Cerebrovascular Disease Father         in his 40s (smoker)     Diabetes Brother      C.A.D. No family hx of      Prostate Cancer No family hx of        Social History     Tobacco Use     Smoking status: Never      "Passive exposure: Never     Smokeless tobacco: Never   Vaping Use     Vaping status: Not on file   Substance Use Topics     Alcohol use: Yes     Alcohol/week: 0.0 standard drinks of alcohol     Comment: rare       Diet: regular, low salt/low fat  Physical Activity: patient exercises 3-4 times weekly  Depression Screen:    Over the past 2 weeks, patient has felt down, depressed, or hopeless:  No    Over the past 2 weeks, patient has felt little interest or pleasure in doing things: No    Functional ability/Safety screen:  Up and go test (able to get up and walk longer than 30 seconds): Passed  Patient needs assistance with: nothing  Patient's home has the following possible safety concerns: none identified  Patient has concerns about his hearing:  No  Cognitive Screen  Patient repeats three objects (ball, flag, tree)      Clock drawing test:   NORMAL  Recalls three objects after 3 minutes (ball,flag,tree):                                                                                               recalls 3 objects (3 points)    Physical Exam:  BP (!) 142/76 (BP Location: Right arm, Patient Position: Chair, Cuff Size: Adult Regular)   Pulse 80   Temp 97  F (36.1  C) (Temporal)   Resp 20   Ht 1.803 m (5' 11\")   Wt 92.2 kg (203 lb 3.2 oz)   BMI 28.34 kg/m     Body mass index is 28.34 kg/m .   BP Readings from Last 3 Encounters:   04/19/23 128/64   12/07/22 112/70   07/19/22 121/71                  End of Life Planning:   Patient currently has an advanced directive: Yes.  Practitioner is supportive of decision.    Education/Counseling:   Based on review of the above information, the following items were addressed:      Diabetes -  access to diabetes self-management training, medical nutrition therapy, and treatment    Appropriate preventive services were discussed with this patient, including applicable screening as appropriate for cardiovascular disease, diabetes, osteopenia/osteoporosis, and glaucoma.  As " appropriate for age/gender, discussed screening for colorectal cancer, prostate cancer, breast cancer, and cervical cancer.   Checklist reviewing preventive services available has been given to the patient.               Assessment & Plan     Type 2 diabetes mellitus without complication, with long-term current use of insulin (H)  Improved control with ozempic, working on healthy habits as well, continue current medications at current doses     MTM check in  - HEMOGLOBIN A1C (BFP)  - VENOUS COLLECTION    Mixed hyperlipidemia  Control uncertain, continue current medications at current doses pending labs    Encounter for Medicare annual wellness exam  discussed preventitive healthcare     Special screening for malignant neoplasm of prostate        Review of the result(s) of each unique test - labs  Ordering of each unique test  Prescription drug management         FUTURE APPOINTMENTS:       - Follow-up visit in 6 mo  Work on weight loss  Regular exercise    Return in about 53 weeks (around 4/24/2024) for Annual Wellness Visit.    Pop Pena MD  [facility]    Sean Sousa is a 72 year old, presenting for the following health issues:  Wellness Visit and Recheck Medication         View : No data to display.              HPI     Diabetes Follow-up    How often are you checking your blood sugar? Two times daily  Blood sugar testing frequency justification:  Adjustment of medication(s)  What time of day are you checking your blood sugars (select all that apply)?  Before meals and At bedtime  Am usually 120-140  Have you had any blood sugars above 200?  Yes   Have you had any blood sugars below 70?  No    What symptoms do you notice when your blood sugar is low?  Shaky    What concerns do you have today about your diabetes? None     Do you have any of these symptoms? (Select all that apply)  No numbness or tingling in feet.  No redness, sores or blisters on feet.  No complaints of  "excessive thirst.  No reports of blurry vision.  No significant changes to weight.    Have you had a diabetic eye exam in the last 12 months? Next week        BP Readings from Last 2 Encounters:   04/19/23 128/64   12/07/22 112/70     Hemoglobin A1C (%)   Date Value   12/07/2022 9.8 (A)   07/06/2022 10.1 (A)     LDL Cholesterol Calculated (mg/dL (calc))   Date Value   02/07/2019 99   02/27/2018 76     LDL Cholesterol Direct (mg/dL)   Date Value   12/07/2022 119   05/03/2022 77         Hyperlipidemia Follow-Up      Are you regularly taking any medication or supplement to lower your cholesterol?   Yes- atorvastatin    Are you having muscle aches or other side effects that you think could be caused by your cholesterol lowering medication?  No      How many servings of fruits and vegetables do you eat daily?  2-3    On average, how many sweetened beverages do you drink each day (Examples: soda, juice, sweet tea, etc.  Do NOT count diet or artificially sweetened beverages)?   1    How many days per week do you exercise enough to make your heart beat faster? 3 or less    How many minutes a day do you exercise enough to make your heart beat faster? 30 - 60    How many days per week do you miss taking your medication? 0          Review of Systems   Constitutional, HEENT, cardiovascular, pulmonary, gi and gu systems are negative, except as otherwise noted.      Objective    /64 (BP Location: Right arm, Patient Position: Chair, Cuff Size: Adult Regular)   Pulse 80   Temp 97  F (36.1  C) (Temporal)   Resp 20   Ht 1.803 m (5' 11\")   Wt 92.2 kg (203 lb 3.2 oz)   BMI 28.34 kg/m    Body mass index is 28.34 kg/m .  Physical Exam   GENERAL: healthy, alert and no distress  EYES: Eyes grossly normal to inspection, PERRL and conjunctivae and sclerae normal  HENT: ear canals and TM's normal, nose and mouth without ulcers or lesions  NECK: no adenopathy, no asymmetry, masses, or scars and thyroid normal to palpation  RESP: " lungs clear to auscultation - no rales, rhonchi or wheezes  CV: regular rate and rhythm, normal S1 S2, no S3 or S4, no murmur, click or rub, no peripheral edema and peripheral pulses strong  ABDOMEN: soft, nontender, no hepatosplenomegaly, no masses and bowel sounds normal  MS: no gross musculoskeletal defects noted, no edema  PSYCH: mentation appears normal, affect normal/bright    A1C 8.1

## 2023-04-20 LAB — ABBOTT PSA - QUEST: 0.76 NG/ML

## 2023-04-20 RX ORDER — SEMAGLUTIDE 1.34 MG/ML
0.5 INJECTION, SOLUTION SUBCUTANEOUS
Qty: 3 ML | Refills: 1 | Status: SHIPPED | OUTPATIENT
Start: 2023-04-20 | End: 2023-07-19

## 2023-04-24 ENCOUNTER — TRANSFERRED RECORDS (OUTPATIENT)
Dept: FAMILY MEDICINE | Facility: CLINIC | Age: 73
End: 2023-04-24

## 2023-07-01 DIAGNOSIS — Z79.4 TYPE 2 DIABETES MELLITUS WITHOUT COMPLICATION, WITH LONG-TERM CURRENT USE OF INSULIN (H): ICD-10-CM

## 2023-07-01 DIAGNOSIS — E11.9 TYPE 2 DIABETES MELLITUS WITHOUT COMPLICATION, WITH LONG-TERM CURRENT USE OF INSULIN (H): ICD-10-CM

## 2023-07-02 RX ORDER — SEMAGLUTIDE 0.68 MG/ML
INJECTION, SOLUTION SUBCUTANEOUS
Refills: 1 | COMMUNITY
Start: 2023-07-02

## 2023-07-03 NOTE — TELEPHONE ENCOUNTER
Received incoming refill request for  Pending Prescriptions:                       Disp   Refills    OZEMPIC (0.25 OR 0.5 MG/DOSE) 2 MG/3ML pe*       1            Sig: INJECT 0.5 MG SUBCUTANEOUS EVERY 7 DAYS    A 90 day supply with 1 refill was sent on 4/20.

## 2023-07-05 DIAGNOSIS — Z79.4 TYPE 2 DIABETES MELLITUS WITHOUT COMPLICATION, WITH LONG-TERM CURRENT USE OF INSULIN (H): Primary | ICD-10-CM

## 2023-07-05 DIAGNOSIS — E11.9 TYPE 2 DIABETES MELLITUS WITHOUT COMPLICATION, WITH LONG-TERM CURRENT USE OF INSULIN (H): Primary | ICD-10-CM

## 2023-07-06 NOTE — TELEPHONE ENCOUNTER
Lars Coleman is requesting a refill of:    Pending Prescriptions:                       Disp   Refills    blood glucose (NO BRAND SPECIFIED) test s*100 st*1            Sig: Use to test blood sugar one times daily or as           directed.    blood glucose monitoring (NO BRAND SPECIF*1 kit  0            Sig: Use to test blood sugar one times daily or as           directed.    blood glucose (NO BRAND SPECIFIED) lancet*100 ea*1            Sig: Use to test blood sugar one times daily or as           directed.

## 2023-07-26 ENCOUNTER — APPOINTMENT (OUTPATIENT)
Dept: URBAN - METROPOLITAN AREA CLINIC 255 | Age: 73
Setting detail: DERMATOLOGY
End: 2023-08-01

## 2023-07-26 VITALS — HEIGHT: 72 IN | WEIGHT: 192 LBS

## 2023-07-26 PROBLEM — C44.311 BASAL CELL CARCINOMA OF SKIN OF NOSE: Status: ACTIVE | Noted: 2023-07-26

## 2023-07-26 PROCEDURE — OTHER PHOTO-DOCUMENTATION: OTHER

## 2023-07-26 PROCEDURE — OTHER DEFER: OTHER

## 2023-07-26 PROCEDURE — 99203 OFFICE O/P NEW LOW 30 MIN: CPT

## 2023-07-26 PROCEDURE — OTHER MIPS QUALITY: OTHER

## 2023-07-26 PROCEDURE — OTHER COUNSELING: OTHER

## 2023-07-26 PROCEDURE — OTHER PATIENT SPECIFIC COUNSELING: OTHER

## 2023-07-26 NOTE — HPI: SKIN LESION (BASAL CELL CARCINOMA)
Is This A New Presentation, Or A Follow-Up?: Follow Up Basal Cell Carcinoma
Location From Outside Provider (Will Override Previously Chosen Location): Nasal tip
When Was Basal Cell Biopsied? (Optional): 4/5/2023

## 2023-07-26 NOTE — PROCEDURE: PATIENT SPECIFIC COUNSELING
Detail Level: Zone
- counseled patient on SRT therapy inquiry \\n- discussed the process requires 20 treatments over the course of the month \\n- advised patient of side effects of treatment \\n- advised patient that it can take several months to heal and scarring is possible \\n- discussed low risk of potential infection during therapy \\n- discussed that Dr. Waggoner would take a look at the site around three times to ensure successful treatment \\n- advised that the cure rate for SRT is in the 99% range\\n- advised patient to avoid swimming while site is open

## 2023-07-26 NOTE — PROCEDURE: DEFER
Detail Level: Detailed
Introduction Text (Please End With A Colon): The following procedure was deferred:
X Size Of Lesion In Cm (Optional): 0
Reason To Defer Override: defer to María for SRT

## 2023-10-05 ENCOUNTER — APPOINTMENT (OUTPATIENT)
Dept: URBAN - METROPOLITAN AREA CLINIC 255 | Age: 73
Setting detail: DERMATOLOGY
End: 2023-10-16

## 2023-10-05 PROBLEM — C44.311 BASAL CELL CARCINOMA OF SKIN OF NOSE: Status: ACTIVE | Noted: 2023-10-05

## 2023-10-05 PROCEDURE — 77300 RADIATION THERAPY DOSE PLAN: CPT

## 2023-10-05 PROCEDURE — OTHER IMAGE GUIDED - SUPERFICIAL RADIOTHERAPY: TREATMENT VISIT: OTHER

## 2023-10-05 PROCEDURE — OTHER IMAGE GUIDED - SUPERFICIAL RADIOTHERAPY: EVALUATION VISIT: OTHER

## 2023-10-05 PROCEDURE — 77280 THER RAD SIMULAJ FIELD SMPL: CPT

## 2023-10-05 PROCEDURE — G6001 ECHO GUIDANCE RADIOTHERAPY: HCPCS | Mod: 59

## 2023-10-05 PROCEDURE — 77401 RADIATION TX DELIVERY SUPFC: CPT

## 2023-10-05 PROCEDURE — OTHER IMAGE GUIDED - SUPERFICIAL RADIOTHERAPY: OTHER

## 2023-10-05 PROCEDURE — OTHER IMAGE GUIDED - SUPERFICIAL RADIOTHERAPY: SIMULATION VISIT: OTHER

## 2023-10-05 PROCEDURE — 77261 THER RADIOLOGY TX PLNG SMPL: CPT

## 2023-10-05 PROCEDURE — 77332 RADIATION TREATMENT AID(S): CPT

## 2023-10-05 NOTE — PROCEDURE: IMAGE GUIDED - SUPERFICIAL RADIOTHERAPY: TREATMENT VISIT
Show Ultrasound In Note?: No
Treatment Documentation: This patient has been treated today with image-guided superficial radiation therapy for non-melanoma skin cancer. Written informed consent has been previously obtained from this patient for this treatment. This consent is documented in the patient’s chart. The patient gave verbal consent to continue treatment today. The patient was treated with a specific radiation dose and setup as prescribed by the provider listed on this visit note. A Radiation Therapist performed administration of radiation under the supervision of a provider. The treatment parameters and cumulative dose are indicated above. Prior to administering the radiation, the patient underwent a verification therapeutic radiology simulation-aided field setting defining relevant normal and abnormal target anatomy in addition to data necessary to develop an optimal radiation treatment process for the patient. The field placement simulation documents any change seen in overall tumor volume documented in the patient’s record, allows the clinician to indicate any needed changes in the treatment plan and/or prescription, provides diagnostic evaluation as the basis for performing the therapeutic procedure, and clearly identifies the information needed to decide to proceed with the therapeutic procedure. This process includes verification of the treatment port(s) and proper treatment positioning. All treatment ports were photographed within electronic medical records. The patient’s lead blocking along with gross tumor volume and margin was confirmed. Considering superficial radiotherapy is clinical in setup, this requires the physician and radiation therapist to clarify the location interest being treated against initial images, pathology, and patient anatomy. Care was taken to ensure bruner treated were geometrically accurate and properly positioned using therapeutic radiology simulation-aided field setting verification per fraction. This process is also utilized to determine if any prescription or setup changes are necessary. These steps are therefore medically necessary to ensure safe and effective administration of radiation. Ongoing therapeutic radiology simulation-aided field setting verification is ordered throughout the course of therapy. \\n\\nPer Dr. Waggoner continued ultrasound guidance will continue on OTV treatment days, and 2 follow up visits, which is required for, measurement of tumor depth, width, breadth, tumor progress, whether to proceed with therapeutic delivery, and acute effect monitoring.
Prescription Used: 1
Bill For Ultrasound ()?: Yes
Energy (Kv): 100
Add X Modifier?: LOPEZ - Unusual Non-Overlapping Service
Calculate Total Cumulative Dose Automatically Or Manually: Manually
Daily Dosage (Cgy): 273.05
Ultrasound Used Text: High frequency ultrasound depth is mm, which is mm in difference from previous imaging.
Ultrasound Not Used Text: Ultrasound was not performed today due to

## 2023-10-05 NOTE — HPI: IMAGE GUIDED- SUPERFICIAL RADIOTHERAPY QUESTIONNAIRE
Functional Status?: 1 (ambulatory, light activity)
Additional History: patient noted he is taking antioxidants, i requested while on treatment he discontinue taking them as they prevent cell death from occurring, patient stated he is not required to be on them medically, he is okay to discontinue. I informed him he can start them back up the day after he completed IGSRT treatments.

## 2023-10-05 NOTE — PROCEDURE: IMAGE GUIDED - SUPERFICIAL RADIOTHERAPY: SIMULATION VISIT
Simulation Documentation: Per the request of Dr. Waggoner, the patient was seen today for Superficial Radiation Planning requiring initial simulation in preparation for treatment of specific diseased sites. Simulation is necessary to determine the correct patient and treatment portal positioning, to deliver safe and effective radiation therapy. A high-frequency ultrasound image was acquired prior to treatment today for two- dimensional evaluation of tumor volume, depth, breadth, width, and response to treatment, in addition, to geometric accuracy of field placement. Per Dr. Waggoner physician evaluation of the ultrasound tumor depth will continue on ordered treatment days which is required for, measurement of tumor depth, breadth, width, progress, and acute effect monitoring and is deemed medically necessary to ensure the efficacy of treatment and determine whether to proceed with delivery of therapeutic. Today’s image and setup were evaluated to determine the initiation of treatment with the current plan or necessary changes as appropriate. All appropriate blocking and treatment parameters were verified by the radiation therapist and provider according to the initial simulation. Frequency of ultrasound image guidance orders are reviewed. Field placement prior to treatment delivery were performed.   \\n\\nPer Dr. Waggoner continued therapeutic radiology simulation-aided field setting verification is required for field placement and documents any change seen in overall tumor volume documented in the patient’s record, allows the clinician to indicate any needed changes in the treatment plan and/or prescription, provides diagnostic evaluation as the basis for performing the therapeutic procedure, and clearly identifies the information needed to decide to proceed with the therapeutic procedure. A total of 7 ultrasounds are being prescribed; 1 ultrasound on initial Simulation Day, 1 at each of the 4 OTVS, and 1 at each of the follow-up appointment to be determined near the end of the treatment course. \\n\\nPer the request of Dr. Waggoner continuing medical physics review as per radiotherapy standard of care post every 5th fraction for the patient, including assessment of treatment parameters,  of dose delivery, and review of patient treatment documentation in support of the provider, is ordered, ensuring efficacy and continued safe delivery of radiotherapy. Included in the physics check is a review of patient setup information, all pertinent simulation and treatment photographs checks, prescription, dose calculation verification, pre fraction dose charted correctly, elapsed days and treatment days correctly charted, cumulative dose correct, and review of any prescription changes. Continued medical physics review post every 5th fraction of radiotherapy is requested by the provider for appropriate radiotherapy management and is deemed medically necessary and standard of care. Simulation Documentation: Per the request of Dr. Waggoner, the patient was seen today for Superficial Radiation Planning requiring initial simulation in preparation for treatment of specific diseased sites. Simulation is necessary to determine the correct patient and treatment portal positioning, to deliver safe and effective radiation therapy. A high-frequency ultrasound image was acquired prior to treatment today for two- dimensional evaluation of tumor volume, depth, breadth, width, and response to treatment, in addition, to geometric accuracy of field placement. Per Dr. Waggoner physician evaluation of the ultrasound tumor depth will continue on ordered treatment days which is required for, measurement of tumor depth, breadth, width, progress, and acute effect monitoring and is deemed medically necessary to ensure the efficacy of treatment and determine whether to proceed with delivery of therapeutic. Today’s image and setup were evaluated to determine the initiation of treatment with the current plan or necessary changes as appropriate. All appropriate blocking and treatment parameters were verified by the radiation therapist and provider according to the initial simulation. Frequency of ultrasound image guidance orders are reviewed. Field placement prior to treatment delivery were performed.   \\n\\nPer Dr. Waggoner continued therapeutic radiology simulation-aided field setting verification is required for field placement and documents any change seen in overall tumor volume documented in the patient’s record, allows the clinician to indicate any needed changes in the treatment plan and/or prescription, provides diagnostic evaluation as the basis for performing the therapeutic procedure, and clearly identifies the information needed to decide to proceed with the therapeutic procedure. A total of 7 ultrasounds are being prescribed; 1 ultrasound on initial Simulation Day, 1 at each of the 4 OTVS, and 1 at each of the follow-up appointment to be determined near the end of the treatment course. \\n\\nPer the request of Dr. aWggoner continuing medical physics review as per radiotherapy standard of care post every 5th fraction for the patient, including assessment of treatment parameters,  of dose delivery, and review of patient treatment documentation in support of the provider, is ordered, ensuring efficacy and continued safe delivery of radiotherapy. Included in the physics check is a review of patient setup information, all pertinent simulation and treatment photographs checks, prescription, dose calculation verification, pre fraction dose charted correctly, elapsed days and treatment days correctly charted, cumulative dose correct, and review of any prescription changes. Continued medical physics review post every 5th fraction of radiotherapy is requested by the provider for appropriate radiotherapy management and is deemed medically necessary and standard of care.

## 2023-10-05 NOTE — HPI: BIOPSY PROVEN BCC
Additional History: Biopsy was taken by primary dermatologist Dr. Michaelle Gaspar, at [facility] in [place], [place]. Additional History: Biopsy was taken by primary dermatologist Dr. Michaelle Gaspar, at [facility] in [place], [place].

## 2023-10-05 NOTE — PROCEDURE: IMAGE GUIDED - SUPERFICIAL RADIOTHERAPY: EVALUATION VISIT
Evaluation Plan: The patient is undergoing superficial radiation therapy for skin cancer and presents for weekly evaluation and management. Per protocol and as documented on the flow sheet, the patient was questioned as to subjective redness, pruritus, pain, drainage, fatigue, or any other symptoms. Objectively, the radiation area was evaluated with regards to erythema, atrophy, scale, crusting, erosion, ulceration, edema, purpura, tenderness, warmth, drainage, and any other findings. The plan was extensively reviewed including dose and dosing schedule. The simulation and clinical setup were also reviewed as were external and any internal shields and based on this review the appropriateness and sufficiency of treatment was determined. \\n\\nA high-frequency ultrasound image was acquired today for a two-dimensional evaluation of the tumor volume, depth, width, breadth, review of prior response to treatment, provide geometric accuracy of field placement, and determine whether to proceed with therapeutic delivery.  \\n \\nA total of 7 ultrasounds are being prescribed; 1 ultrasound on initial Simulation Day, 1 at each of the 4 OTVS, and 1 at each of the follow-up appointment to be determined near the end of the treatment course.
Show Ultrasound In Note?: No
Is This Visit For Evaluation During Treatment Or Follow Up Post Treatment?: evaluation
Radiation Therapy Oncology Group (Rtog) Score: 0
Ultrasound Not Used Text: Ultrasound was not performed today due to
Ultrasound Used Text: Ultrasound depth is mm.
Assessment: Appropriate reaction

## 2023-10-05 NOTE — PROCEDURE: IMAGE GUIDED - SUPERFICIAL RADIOTHERAPY
Body Location Override (Optional): Nasal Supratip
Detail Level: Detailed
Pathology Override (Pathology Will Render As Diagnosis Name If Left Blank): Primary Nodular Basal Cell Carcinoma
Field Number: 1
Lesion Dimensions-X Axis In Cm: 0.6
Lesion Margin Size In Cm: 0.4
Shield Size In Cm: 1.5cm x 1.5cm
Applicator Size In Cm: 2.0 cm
Energy (Kv): 100
Treatment Time (Min): 0.43
Time, Dose, Fractionation Factor (Tdf) For Prescription 1: 87
Daily Dose (Cgy): 273.05
Number Of Fractions For Prescription 1: 18
Treatments Per Week: 3
Total Dose For Prescription 1 (Cgy): 4914.90
Add A Second Prescription?: Yes
Treatment Time (Min): 0.44
Time, Dose, Fractionation Factor (Tdf) For Prescription 2: 8
Daily Dose (Cgy): 279.40
Number Of Fractions For Prescription 2: 2
Total Dose For Prescription 2 (Cgy): 558.80
Add A Third Prescription?: No
Additional Fraction(S) Needed:: 0
Bill For Physics Consultation: No - Render Text Only
Physics Documentation: (Physics Check Verbiage)

## 2023-10-05 NOTE — PROCEDURE: IMAGE GUIDED - SUPERFICIAL RADIOTHERAPY: SIMULATION VISIT
Bill For Treatment Planning: Yes- (Simple Planning- 1 Site: [id number]) Bill For Treatment Planning: Yes- (Simple Planning- 1 Site: [id number])

## 2023-10-05 NOTE — PROCEDURE: IMAGE GUIDED - SUPERFICIAL RADIOTHERAPY: SIMULATION VISIT
Additional Comments (Add Customization Of Note Here): CeraVe Daily ultra light weight Moisturizing lotion with 30 SPF\\nCeraVe Healing Ointment\\nCeraVe Hydrating cleansing bar and wash\\nCetaphil daily moisturizing lotion with 30 SPF\\nLa Roche-Posay Lipikar AP+m Triple repair moisturizing cream\\nLa Roche-Posay Double Toleriane Moisturizing lotion plus UV\\nLa Roche-Posay Lipikar Gentle foaming moisturizing wash\\nLa Roche-Posay Anthelios Melt in milk Body and face 60 SPFF\\nCetaphil Healing ointment

## 2023-10-09 ENCOUNTER — APPOINTMENT (OUTPATIENT)
Dept: URBAN - METROPOLITAN AREA CLINIC 255 | Age: 73
Setting detail: DERMATOLOGY
End: 2023-10-16

## 2023-10-09 PROBLEM — C44.311 BASAL CELL CARCINOMA OF SKIN OF NOSE: Status: ACTIVE | Noted: 2023-10-09

## 2023-10-09 PROCEDURE — OTHER IMAGE GUIDED - SUPERFICIAL RADIOTHERAPY: TREATMENT VISIT: OTHER

## 2023-10-09 PROCEDURE — 77401 RADIATION TX DELIVERY SUPFC: CPT

## 2023-10-09 PROCEDURE — OTHER IMAGE GUIDED - SUPERFICIAL RADIOTHERAPY: OTHER

## 2023-10-09 PROCEDURE — 77280 THER RAD SIMULAJ FIELD SMPL: CPT

## 2023-10-11 ENCOUNTER — APPOINTMENT (OUTPATIENT)
Dept: URBAN - METROPOLITAN AREA CLINIC 255 | Age: 73
Setting detail: DERMATOLOGY
End: 2023-10-16

## 2023-10-11 PROBLEM — C44.311 BASAL CELL CARCINOMA OF SKIN OF NOSE: Status: ACTIVE | Noted: 2023-10-11

## 2023-10-11 PROCEDURE — OTHER IMAGE GUIDED - SUPERFICIAL RADIOTHERAPY: OTHER

## 2023-10-11 PROCEDURE — OTHER IMAGE GUIDED - SUPERFICIAL RADIOTHERAPY: TREATMENT VISIT: OTHER

## 2023-10-11 PROCEDURE — 77280 THER RAD SIMULAJ FIELD SMPL: CPT

## 2023-10-11 PROCEDURE — 77401 RADIATION TX DELIVERY SUPFC: CPT

## 2023-10-11 NOTE — PROCEDURE: IMAGE GUIDED - SUPERFICIAL RADIOTHERAPY: TREATMENT VISIT
Show Ultrasound In Note?: No
Treatment Documentation: This patient has been treated today with image-guided superficial radiation therapy for non-melanoma skin cancer. Written informed consent has been previously obtained from this patient for this treatment. This consent is documented in the patient’s chart. The patient gave verbal consent to continue treatment today. The patient was treated with a specific radiation dose and setup as prescribed by the provider listed on this visit note. A Radiation Therapist performed administration of radiation under the supervision of a provider. The treatment parameters and cumulative dose are indicated above. Prior to administering the radiation, the patient underwent a verification therapeutic radiology simulation-aided field setting defining relevant normal and abnormal target anatomy in addition to data necessary to develop an optimal radiation treatment process for the patient. The field placement simulation documents any change seen in overall tumor volume documented in the patient’s record, allows the clinician to indicate any needed changes in the treatment plan and/or prescription, provides diagnostic evaluation as the basis for performing the therapeutic procedure, and clearly identifies the information needed to decide to proceed with the therapeutic procedure. This process includes verification of the treatment port(s) and proper treatment positioning. All treatment ports were photographed within electronic medical records. The patient’s lead blocking along with gross tumor volume and margin was confirmed. Considering superficial radiotherapy is clinical in setup, this requires the physician and radiation therapist to clarify the location interest being treated against initial images, pathology, and patient anatomy. Care was taken to ensure bruner treated were geometrically accurate and properly positioned using therapeutic radiology simulation-aided field setting verification per fraction. This process is also utilized to determine if any prescription or setup changes are necessary. These steps are therefore medically necessary to ensure safe and effective administration of radiation. Ongoing therapeutic radiology simulation-aided field setting verification is ordered throughout the course of therapy. \\n\\nPer Dr. Waggoner continued ultrasound guidance will continue on OTV treatment days, and 2 follow up visits, which is required for, measurement of tumor depth, width, breadth, tumor progress, whether to proceed with therapeutic delivery, and acute effect monitoring.
Prescription Used: 1
Bill For Ultrasound ()?: Yes
Fraction Number: 2
Energy (Kv): 100
Calculate Total Cumulative Dose Automatically Or Manually: Manually
Daily Dosage (Cgy): 273.05
Ultrasound Used Text: High frequency ultrasound depth is mm, which is mm in difference from previous imaging.
Total Cumulative Dose (Cgy): 546.10
Ultrasound Not Used Text: Ultrasound was not performed today due to

## 2023-10-12 ENCOUNTER — APPOINTMENT (OUTPATIENT)
Dept: URBAN - METROPOLITAN AREA CLINIC 255 | Age: 73
Setting detail: DERMATOLOGY
End: 2023-10-16

## 2023-10-12 PROBLEM — C44.311 BASAL CELL CARCINOMA OF SKIN OF NOSE: Status: ACTIVE | Noted: 2023-10-12

## 2023-10-12 PROCEDURE — OTHER IMAGE GUIDED - SUPERFICIAL RADIOTHERAPY: TREATMENT VISIT: OTHER

## 2023-10-12 PROCEDURE — 77401 RADIATION TX DELIVERY SUPFC: CPT

## 2023-10-12 PROCEDURE — OTHER IMAGE GUIDED - SUPERFICIAL RADIOTHERAPY: OTHER

## 2023-10-12 PROCEDURE — 77280 THER RAD SIMULAJ FIELD SMPL: CPT

## 2023-10-12 NOTE — PROCEDURE: IMAGE GUIDED - SUPERFICIAL RADIOTHERAPY: TREATMENT VISIT
Show Ultrasound In Note?: No
Treatment Documentation: This patient has been treated today with image-guided superficial radiation therapy for non-melanoma skin cancer. Written informed consent has been previously obtained from this patient for this treatment. This consent is documented in the patient’s chart. The patient gave verbal consent to continue treatment today. The patient was treated with a specific radiation dose and setup as prescribed by the provider listed on this visit note. A Radiation Therapist performed administration of radiation under the supervision of a provider. The treatment parameters and cumulative dose are indicated above. Prior to administering the radiation, the patient underwent a verification therapeutic radiology simulation-aided field setting defining relevant normal and abnormal target anatomy in addition to data necessary to develop an optimal radiation treatment process for the patient. The field placement simulation documents any change seen in overall tumor volume documented in the patient’s record, allows the clinician to indicate any needed changes in the treatment plan and/or prescription, provides diagnostic evaluation as the basis for performing the therapeutic procedure, and clearly identifies the information needed to decide to proceed with the therapeutic procedure. This process includes verification of the treatment port(s) and proper treatment positioning. All treatment ports were photographed within electronic medical records. The patient’s lead blocking along with gross tumor volume and margin was confirmed. Considering superficial radiotherapy is clinical in setup, this requires the physician and radiation therapist to clarify the location interest being treated against initial images, pathology, and patient anatomy. Care was taken to ensure bruner treated were geometrically accurate and properly positioned using therapeutic radiology simulation-aided field setting verification per fraction. This process is also utilized to determine if any prescription or setup changes are necessary. These steps are therefore medically necessary to ensure safe and effective administration of radiation. Ongoing therapeutic radiology simulation-aided field setting verification is ordered throughout the course of therapy. \\n\\nPer Dr. Waggoner continued ultrasound guidance will continue on OTV treatment days, and 2 follow up visits, which is required for, measurement of tumor depth, width, breadth, tumor progress, whether to proceed with therapeutic delivery, and acute effect monitoring.
Prescription Used: 1
Bill For Ultrasound ()?: Yes
Fraction Number: 3
Energy (Kv): 100
Calculate Total Cumulative Dose Automatically Or Manually: Manually
Daily Dosage (Cgy): 273.05
Ultrasound Used Text: High frequency ultrasound depth is mm, which is mm in difference from previous imaging.
Total Cumulative Dose (Cgy): 819.15
Ultrasound Not Used Text: Ultrasound was not performed today due to

## 2023-10-16 ENCOUNTER — APPOINTMENT (OUTPATIENT)
Dept: URBAN - METROPOLITAN AREA CLINIC 255 | Age: 73
Setting detail: DERMATOLOGY
End: 2023-10-17

## 2023-10-16 PROBLEM — C44.311 BASAL CELL CARCINOMA OF SKIN OF NOSE: Status: ACTIVE | Noted: 2023-10-16

## 2023-10-16 PROCEDURE — 77401 RADIATION TX DELIVERY SUPFC: CPT

## 2023-10-16 PROCEDURE — OTHER IMAGE GUIDED - SUPERFICIAL RADIOTHERAPY: TREATMENT VISIT: OTHER

## 2023-10-16 PROCEDURE — 77280 THER RAD SIMULAJ FIELD SMPL: CPT

## 2023-10-16 PROCEDURE — OTHER IMAGE GUIDED - SUPERFICIAL RADIOTHERAPY: OTHER

## 2023-10-16 NOTE — PROCEDURE: IMAGE GUIDED - SUPERFICIAL RADIOTHERAPY: TREATMENT VISIT
Show Ultrasound In Note?: No
Treatment Documentation: This patient has been treated today with image-guided superficial radiation therapy for non-melanoma skin cancer. Written informed consent has been previously obtained from this patient for this treatment. This consent is documented in the patient’s chart. The patient gave verbal consent to continue treatment today. The patient was treated with a specific radiation dose and setup as prescribed by the provider listed on this visit note. A Radiation Therapist performed administration of radiation under the supervision of a provider. The treatment parameters and cumulative dose are indicated above. Prior to administering the radiation, the patient underwent a verification therapeutic radiology simulation-aided field setting defining relevant normal and abnormal target anatomy in addition to data necessary to develop an optimal radiation treatment process for the patient. The field placement simulation documents any change seen in overall tumor volume documented in the patient’s record, allows the clinician to indicate any needed changes in the treatment plan and/or prescription, provides diagnostic evaluation as the basis for performing the therapeutic procedure, and clearly identifies the information needed to decide to proceed with the therapeutic procedure. This process includes verification of the treatment port(s) and proper treatment positioning. All treatment ports were photographed within electronic medical records. The patient’s lead blocking along with gross tumor volume and margin was confirmed. Considering superficial radiotherapy is clinical in setup, this requires the physician and radiation therapist to clarify the location interest being treated against initial images, pathology, and patient anatomy. Care was taken to ensure bruner treated were geometrically accurate and properly positioned using therapeutic radiology simulation-aided field setting verification per fraction. This process is also utilized to determine if any prescription or setup changes are necessary. These steps are therefore medically necessary to ensure safe and effective administration of radiation. Ongoing therapeutic radiology simulation-aided field setting verification is ordered throughout the course of therapy. \\n\\nPer Dr. Waggoner continued ultrasound guidance will continue on OTV treatment days, and 2 follow up visits, which is required for, measurement of tumor depth, width, breadth, tumor progress, whether to proceed with therapeutic delivery, and acute effect monitoring.
Prescription Used: 1
Bill For Ultrasound ()?: Yes
Fraction Number: 4
Energy (Kv): 100
Calculate Total Cumulative Dose Automatically Or Manually: Manually
Daily Dosage (Cgy): 273.05
Ultrasound Used Text: High frequency ultrasound depth is mm, which is mm in difference from previous imaging.
Total Cumulative Dose (Cgy): 1092.20
Ultrasound Not Used Text: Ultrasound was not performed today due to

## 2023-10-18 ENCOUNTER — APPOINTMENT (OUTPATIENT)
Dept: URBAN - METROPOLITAN AREA CLINIC 255 | Age: 73
Setting detail: DERMATOLOGY
End: 2023-10-30

## 2023-10-18 PROBLEM — C44.311 BASAL CELL CARCINOMA OF SKIN OF NOSE: Status: ACTIVE | Noted: 2023-10-18

## 2023-10-18 PROCEDURE — 77280 THER RAD SIMULAJ FIELD SMPL: CPT

## 2023-10-18 PROCEDURE — OTHER IMAGE GUIDED - SUPERFICIAL RADIOTHERAPY: TREATMENT VISIT: OTHER

## 2023-10-18 PROCEDURE — 77401 RADIATION TX DELIVERY SUPFC: CPT

## 2023-10-18 PROCEDURE — OTHER IMAGE GUIDED - SUPERFICIAL RADIOTHERAPY: OTHER

## 2023-10-18 NOTE — PROCEDURE: IMAGE GUIDED - SUPERFICIAL RADIOTHERAPY: TREATMENT VISIT
Show Ultrasound In Note?: No
Treatment Documentation: This patient has been treated today with image-guided superficial radiation therapy for non-melanoma skin cancer. Written informed consent has been previously obtained from this patient for this treatment. This consent is documented in the patient’s chart. The patient gave verbal consent to continue treatment today. The patient was treated with a specific radiation dose and setup as prescribed by the provider listed on this visit note. A Radiation Therapist performed administration of radiation under the supervision of a provider. The treatment parameters and cumulative dose are indicated above. Prior to administering the radiation, the patient underwent a verification therapeutic radiology simulation-aided field setting defining relevant normal and abnormal target anatomy in addition to data necessary to develop an optimal radiation treatment process for the patient. The field placement simulation documents any change seen in overall tumor volume documented in the patient’s record, allows the clinician to indicate any needed changes in the treatment plan and/or prescription, provides diagnostic evaluation as the basis for performing the therapeutic procedure, and clearly identifies the information needed to decide to proceed with the therapeutic procedure. This process includes verification of the treatment port(s) and proper treatment positioning. All treatment ports were photographed within electronic medical records. The patient’s lead blocking along with gross tumor volume and margin was confirmed. Considering superficial radiotherapy is clinical in setup, this requires the physician and radiation therapist to clarify the location interest being treated against initial images, pathology, and patient anatomy. Care was taken to ensure bruner treated were geometrically accurate and properly positioned using therapeutic radiology simulation-aided field setting verification per fraction. This process is also utilized to determine if any prescription or setup changes are necessary. These steps are therefore medically necessary to ensure safe and effective administration of radiation. Ongoing therapeutic radiology simulation-aided field setting verification is ordered throughout the course of therapy. \\n\\nPer Dr. Waggoner continued ultrasound guidance will continue on OTV treatment days, and 2 follow up visits, which is required for, measurement of tumor depth, width, breadth, tumor progress, whether to proceed with therapeutic delivery, and acute effect monitoring.
Prescription Used: 1
Bill For Ultrasound ()?: Yes
Fraction Number: 5
Energy (Kv): 100
Calculate Total Cumulative Dose Automatically Or Manually: Manually
Daily Dosage (Cgy): 273.05
Ultrasound Used Text: High frequency ultrasound depth is mm, which is mm in difference from previous imaging.
Total Cumulative Dose (Cgy): 1365.25
Ultrasound Not Used Text: Ultrasound was not performed today due to

## 2023-10-19 ENCOUNTER — APPOINTMENT (OUTPATIENT)
Dept: URBAN - METROPOLITAN AREA CLINIC 255 | Age: 73
Setting detail: DERMATOLOGY
End: 2023-10-20

## 2023-10-19 PROBLEM — C44.311 BASAL CELL CARCINOMA OF SKIN OF NOSE: Status: ACTIVE | Noted: 2023-10-19

## 2023-10-19 PROCEDURE — OTHER IMAGE GUIDED - SUPERFICIAL RADIOTHERAPY: EVALUATION VISIT: OTHER

## 2023-10-19 PROCEDURE — G6001 ECHO GUIDANCE RADIOTHERAPY: HCPCS | Mod: GA

## 2023-10-19 PROCEDURE — 77427 RADIATION TX MANAGEMENT X5: CPT

## 2023-10-19 PROCEDURE — OTHER IMAGE GUIDED - SUPERFICIAL RADIOTHERAPY: OTHER

## 2023-10-19 PROCEDURE — 77280 THER RAD SIMULAJ FIELD SMPL: CPT

## 2023-10-19 PROCEDURE — OTHER IMAGE GUIDED - SUPERFICIAL RADIOTHERAPY: TREATMENT VISIT: OTHER

## 2023-10-19 PROCEDURE — 77401 RADIATION TX DELIVERY SUPFC: CPT

## 2023-10-19 NOTE — PROCEDURE: IMAGE GUIDED - SUPERFICIAL RADIOTHERAPY: EVALUATION VISIT
Evaluation Plan: The patient is undergoing superficial radiation therapy for skin cancer and presents for weekly evaluation and management. Per protocol and as documented on the flow sheet, the patient was questioned as to subjective redness, pruritus, pain, drainage, fatigue, or any other symptoms. Objectively, the radiation area was evaluated with regards to erythema, atrophy, scale, crusting, erosion, ulceration, edema, purpura, tenderness, warmth, drainage, and any other findings. The plan was extensively reviewed including dose and dosing schedule. The simulation and clinical setup were also reviewed as were external and any internal shields and based on this review the appropriateness and sufficiency of treatment was determined. \\n\\nA high-frequency ultrasound image was acquired today for a two-dimensional evaluation of the tumor volume, depth, width, breadth, review of prior response to treatment, provide geometric accuracy of field placement, and determine whether to proceed with therapeutic delivery.  \\n \\nA total of 7 ultrasounds are being prescribed; 1 ultrasound on initial Simulation Day, 1 at each of the 4 OTVS, and 1 at each of the follow-up appointment to be determined near the end of the treatment course.
Show Ultrasound In Note?: Yes
Is This Visit For Evaluation During Treatment Or Follow Up Post Treatment?: evaluation
Radiation Therapy Oncology Group (Rtog) Score: 0
Ultrasound Not Used Text: Ultrasound was not performed today due to
Ultrasound Used Text: Ultrasound depth is 1.82 mm.
Assessment: Appropriate reaction

## 2023-10-19 NOTE — PROCEDURE: IMAGE GUIDED - SUPERFICIAL RADIOTHERAPY: TREATMENT VISIT
Show Ultrasound In Note?: No
Treatment Documentation: This patient has been treated today with image-guided superficial radiation therapy for non-melanoma skin cancer. Written informed consent has been previously obtained from this patient for this treatment. This consent is documented in the patient’s chart. The patient gave verbal consent to continue treatment today. The patient was treated with a specific radiation dose and setup as prescribed by the provider listed on this visit note. A Radiation Therapist performed administration of radiation under the supervision of a provider. The treatment parameters and cumulative dose are indicated above. Prior to administering the radiation, the patient underwent a verification therapeutic radiology simulation-aided field setting defining relevant normal and abnormal target anatomy in addition to data necessary to develop an optimal radiation treatment process for the patient. The field placement simulation documents any change seen in overall tumor volume documented in the patient’s record, allows the clinician to indicate any needed changes in the treatment plan and/or prescription, provides diagnostic evaluation as the basis for performing the therapeutic procedure, and clearly identifies the information needed to decide to proceed with the therapeutic procedure. This process includes verification of the treatment port(s) and proper treatment positioning. All treatment ports were photographed within electronic medical records. The patient’s lead blocking along with gross tumor volume and margin was confirmed. Considering superficial radiotherapy is clinical in setup, this requires the physician and radiation therapist to clarify the location interest being treated against initial images, pathology, and patient anatomy. Care was taken to ensure bruner treated were geometrically accurate and properly positioned using therapeutic radiology simulation-aided field setting verification per fraction. This process is also utilized to determine if any prescription or setup changes are necessary. These steps are therefore medically necessary to ensure safe and effective administration of radiation. Ongoing therapeutic radiology simulation-aided field setting verification is ordered throughout the course of therapy. \\n\\nPer Dr. Waggoner continued ultrasound guidance will continue on OTV treatment days, and 2 follow up visits, which is required for, measurement of tumor depth, width, breadth, tumor progress, whether to proceed with therapeutic delivery, and acute effect monitoring.
Prescription Used: 1
Bill For Ultrasound ()?: Yes
Fraction Number: 6
Energy (Kv): 100
Calculate Total Cumulative Dose Automatically Or Manually: Manually
Daily Dosage (Cgy): 273.05
Ultrasound Used Text: High frequency ultrasound depth is mm, which is mm in difference from previous imaging.
Total Cumulative Dose (Cgy): 1638.30
Ultrasound Not Used Text: Ultrasound was not performed today due to

## 2023-10-21 ENCOUNTER — APPOINTMENT (OUTPATIENT)
Dept: URBAN - METROPOLITAN AREA CLINIC 255 | Age: 73
Setting detail: DERMATOLOGY
End: 2023-12-26

## 2023-10-21 PROBLEM — C44.311 BASAL CELL CARCINOMA OF SKIN OF NOSE: Status: ACTIVE | Noted: 2023-10-21

## 2023-10-21 PROCEDURE — OTHER IMAGE GUIDED - SUPERFICIAL RADIOTHERAPY: OTHER

## 2023-10-21 PROCEDURE — 77336 RADIATION PHYSICS CONSULT: CPT

## 2023-10-21 NOTE — PROCEDURE: IMAGE GUIDED - SUPERFICIAL RADIOTHERAPY
Body Location Override (Optional): Nasal Supratip
Detail Level: Detailed
Pathology Override (Pathology Will Render As Diagnosis Name If Left Blank): Primary Nodular Basal Cell Carcinoma
Field Number: 1
Lesion Dimensions-X Axis In Cm: 0.6
Lesion Margin Size In Cm: 0.4
Shield Size In Cm: 1.5cm x 1.5cm
Applicator Size In Cm: 2.0 cm
Energy (Kv): 100
Treatment Time (Min): 0.43
Time, Dose, Fractionation Factor (Tdf) For Prescription 1: 87
Daily Dose (Cgy): 273.05
Number Of Fractions For Prescription 1: 18
Treatments Per Week: 3
Total Dose For Prescription 1 (Cgy): 4914.90
Add A Second Prescription?: Yes
Treatment Time (Min): 0.44
Time, Dose, Fractionation Factor (Tdf) For Prescription 2: 8
Daily Dose (Cgy): 279.40
Number Of Fractions For Prescription 2: 2
Total Dose For Prescription 2 (Cgy): 558.80
Add A Third Prescription?: No
Additional Fraction(S) Needed:: 0
Physics Consultation Performed For Fractions:: 1-5
Physics Documentation: Per the request of Dr. Waggoner, continuing medical physics review as per radiotherapy standard of care post every 5th fraction for patient, including assessment of treatment parameters,  of dose delivery, and review of patient treatment documentation in support of the provider, to ensure efficacy and continued safe delivery of radiotherapy. Included in physics check is review of patient setup information, all pertinent simulation and treatment photographs checks, prescription, dose calculation verification, per fraction dose charted correctly, elapsed days and treatment days correctly charted, cumulative dose correct, and review of any prescription changes. Patient was not present, nor was it necessary for the patient to be present for weekly physics review and no other superficial radiotherapy services were rendered on this day. Continued medical physics review post every 5th fraction of therapy is requested by provider for appropriate radiotherapy management and is deemed medically necessary and standard of care.

## 2023-10-23 ENCOUNTER — APPOINTMENT (OUTPATIENT)
Dept: URBAN - METROPOLITAN AREA CLINIC 255 | Age: 73
Setting detail: DERMATOLOGY
End: 2023-10-24

## 2023-10-23 PROBLEM — C44.311 BASAL CELL CARCINOMA OF SKIN OF NOSE: Status: ACTIVE | Noted: 2023-10-23

## 2023-10-23 PROCEDURE — 77280 THER RAD SIMULAJ FIELD SMPL: CPT

## 2023-10-23 PROCEDURE — OTHER IMAGE GUIDED - SUPERFICIAL RADIOTHERAPY: OTHER

## 2023-10-23 PROCEDURE — OTHER IMAGE GUIDED - SUPERFICIAL RADIOTHERAPY: TREATMENT VISIT: OTHER

## 2023-10-23 PROCEDURE — 77401 RADIATION TX DELIVERY SUPFC: CPT

## 2023-10-23 NOTE — PROCEDURE: IMAGE GUIDED - SUPERFICIAL RADIOTHERAPY: TREATMENT VISIT
Show Ultrasound In Note?: No
Treatment Documentation: This patient has been treated today with image-guided superficial radiation therapy for non-melanoma skin cancer. Written informed consent has been previously obtained from this patient for this treatment. This consent is documented in the patient’s chart. The patient gave verbal consent to continue treatment today. The patient was treated with a specific radiation dose and setup as prescribed by the provider listed on this visit note. A Radiation Therapist performed administration of radiation under the supervision of a provider. The treatment parameters and cumulative dose are indicated above. Prior to administering the radiation, the patient underwent a verification therapeutic radiology simulation-aided field setting defining relevant normal and abnormal target anatomy in addition to data necessary to develop an optimal radiation treatment process for the patient. The field placement simulation documents any change seen in overall tumor volume documented in the patient’s record, allows the clinician to indicate any needed changes in the treatment plan and/or prescription, provides diagnostic evaluation as the basis for performing the therapeutic procedure, and clearly identifies the information needed to decide to proceed with the therapeutic procedure. This process includes verification of the treatment port(s) and proper treatment positioning. All treatment ports were photographed within electronic medical records. The patient’s lead blocking along with gross tumor volume and margin was confirmed. Considering superficial radiotherapy is clinical in setup, this requires the physician and radiation therapist to clarify the location interest being treated against initial images, pathology, and patient anatomy. Care was taken to ensure bruner treated were geometrically accurate and properly positioned using therapeutic radiology simulation-aided field setting verification per fraction. This process is also utilized to determine if any prescription or setup changes are necessary. These steps are therefore medically necessary to ensure safe and effective administration of radiation. Ongoing therapeutic radiology simulation-aided field setting verification is ordered throughout the course of therapy. \\n\\nPer Dr. Waggoner continued ultrasound guidance will continue on OTV treatment days, and 2 follow up visits, which is required for, measurement of tumor depth, width, breadth, tumor progress, whether to proceed with therapeutic delivery, and acute effect monitoring.
Prescription Used: 1
Bill For Ultrasound ()?: Yes
Fraction Number: 7
Energy (Kv): 100
Calculate Total Cumulative Dose Automatically Or Manually: Manually
Daily Dosage (Cgy): 273.05
Ultrasound Used Text: High frequency ultrasound depth is mm, which is mm in difference from previous imaging.
Total Cumulative Dose (Cgy): 1911.35
Ultrasound Not Used Text: Ultrasound was not performed today due to

## 2023-10-25 ENCOUNTER — APPOINTMENT (OUTPATIENT)
Dept: URBAN - METROPOLITAN AREA CLINIC 255 | Age: 73
Setting detail: DERMATOLOGY
End: 2023-10-27

## 2023-10-25 PROBLEM — C44.311 BASAL CELL CARCINOMA OF SKIN OF NOSE: Status: ACTIVE | Noted: 2023-10-25

## 2023-10-25 PROCEDURE — 77280 THER RAD SIMULAJ FIELD SMPL: CPT

## 2023-10-25 PROCEDURE — 77401 RADIATION TX DELIVERY SUPFC: CPT

## 2023-10-25 PROCEDURE — OTHER IMAGE GUIDED - SUPERFICIAL RADIOTHERAPY: OTHER

## 2023-10-25 PROCEDURE — OTHER IMAGE GUIDED - SUPERFICIAL RADIOTHERAPY: TREATMENT VISIT: OTHER

## 2023-10-25 NOTE — PROCEDURE: IMAGE GUIDED - SUPERFICIAL RADIOTHERAPY: TREATMENT VISIT
Show Ultrasound In Note?: No
Treatment Documentation: This patient has been treated today with image-guided superficial radiation therapy for non-melanoma skin cancer. Written informed consent has been previously obtained from this patient for this treatment. This consent is documented in the patient’s chart. The patient gave verbal consent to continue treatment today. The patient was treated with a specific radiation dose and setup as prescribed by the provider listed on this visit note. A Radiation Therapist performed administration of radiation under the supervision of a provider. The treatment parameters and cumulative dose are indicated above. Prior to administering the radiation, the patient underwent a verification therapeutic radiology simulation-aided field setting defining relevant normal and abnormal target anatomy in addition to data necessary to develop an optimal radiation treatment process for the patient. The field placement simulation documents any change seen in overall tumor volume documented in the patient’s record, allows the clinician to indicate any needed changes in the treatment plan and/or prescription, provides diagnostic evaluation as the basis for performing the therapeutic procedure, and clearly identifies the information needed to decide to proceed with the therapeutic procedure. This process includes verification of the treatment port(s) and proper treatment positioning. All treatment ports were photographed within electronic medical records. The patient’s lead blocking along with gross tumor volume and margin was confirmed. Considering superficial radiotherapy is clinical in setup, this requires the physician and radiation therapist to clarify the location interest being treated against initial images, pathology, and patient anatomy. Care was taken to ensure bruner treated were geometrically accurate and properly positioned using therapeutic radiology simulation-aided field setting verification per fraction. This process is also utilized to determine if any prescription or setup changes are necessary. These steps are therefore medically necessary to ensure safe and effective administration of radiation. Ongoing therapeutic radiology simulation-aided field setting verification is ordered throughout the course of therapy. \\n\\nPer Dr. Waggoner continued ultrasound guidance will continue on OTV treatment days, and 2 follow up visits, which is required for, measurement of tumor depth, width, breadth, tumor progress, whether to proceed with therapeutic delivery, and acute effect monitoring.
Prescription Used: 1
Bill For Ultrasound ()?: Yes
Fraction Number: 8
Energy (Kv): 100
Calculate Total Cumulative Dose Automatically Or Manually: Manually
Daily Dosage (Cgy): 273.05
Ultrasound Used Text: High frequency ultrasound depth is mm, which is mm in difference from previous imaging.
Total Cumulative Dose (Cgy): 2184.40
Ultrasound Not Used Text: Ultrasound was not performed today due to

## 2023-10-26 ENCOUNTER — APPOINTMENT (OUTPATIENT)
Dept: URBAN - METROPOLITAN AREA CLINIC 255 | Age: 73
Setting detail: DERMATOLOGY
End: 2023-10-27

## 2023-10-26 PROBLEM — C44.311 BASAL CELL CARCINOMA OF SKIN OF NOSE: Status: ACTIVE | Noted: 2023-10-26

## 2023-10-26 PROCEDURE — 77401 RADIATION TX DELIVERY SUPFC: CPT

## 2023-10-26 PROCEDURE — OTHER IMAGE GUIDED - SUPERFICIAL RADIOTHERAPY: TREATMENT VISIT: OTHER

## 2023-10-26 PROCEDURE — 77280 THER RAD SIMULAJ FIELD SMPL: CPT

## 2023-10-26 PROCEDURE — OTHER IMAGE GUIDED - SUPERFICIAL RADIOTHERAPY: OTHER

## 2023-10-26 NOTE — PROCEDURE: IMAGE GUIDED - SUPERFICIAL RADIOTHERAPY: TREATMENT VISIT
Show Ultrasound In Note?: No
Treatment Documentation: This patient has been treated today with image-guided superficial radiation therapy for non-melanoma skin cancer. Written informed consent has been previously obtained from this patient for this treatment. This consent is documented in the patient’s chart. The patient gave verbal consent to continue treatment today. The patient was treated with a specific radiation dose and setup as prescribed by the provider listed on this visit note. A Radiation Therapist performed administration of radiation under the supervision of a provider. The treatment parameters and cumulative dose are indicated above. Prior to administering the radiation, the patient underwent a verification therapeutic radiology simulation-aided field setting defining relevant normal and abnormal target anatomy in addition to data necessary to develop an optimal radiation treatment process for the patient. The field placement simulation documents any change seen in overall tumor volume documented in the patient’s record, allows the clinician to indicate any needed changes in the treatment plan and/or prescription, provides diagnostic evaluation as the basis for performing the therapeutic procedure, and clearly identifies the information needed to decide to proceed with the therapeutic procedure. This process includes verification of the treatment port(s) and proper treatment positioning. All treatment ports were photographed within electronic medical records. The patient’s lead blocking along with gross tumor volume and margin was confirmed. Considering superficial radiotherapy is clinical in setup, this requires the physician and radiation therapist to clarify the location interest being treated against initial images, pathology, and patient anatomy. Care was taken to ensure bruner treated were geometrically accurate and properly positioned using therapeutic radiology simulation-aided field setting verification per fraction. This process is also utilized to determine if any prescription or setup changes are necessary. These steps are therefore medically necessary to ensure safe and effective administration of radiation. Ongoing therapeutic radiology simulation-aided field setting verification is ordered throughout the course of therapy. \\n\\nPer Dr. Waggoner continued ultrasound guidance will continue on OTV treatment days, and 2 follow up visits, which is required for, measurement of tumor depth, width, breadth, tumor progress, whether to proceed with therapeutic delivery, and acute effect monitoring.
Prescription Used: 1
Bill For Ultrasound ()?: Yes
Fraction Number: 9
Energy (Kv): 100
Calculate Total Cumulative Dose Automatically Or Manually: Manually
Daily Dosage (Cgy): 273.05
Ultrasound Used Text: High frequency ultrasound depth is mm, which is mm in difference from previous imaging.
Total Cumulative Dose (Cgy): 2457.45
Ultrasound Not Used Text: Ultrasound was not performed today due to

## 2023-10-30 ENCOUNTER — APPOINTMENT (OUTPATIENT)
Dept: URBAN - METROPOLITAN AREA CLINIC 255 | Age: 73
Setting detail: DERMATOLOGY
End: 2023-10-30

## 2023-10-30 PROBLEM — C44.311 BASAL CELL CARCINOMA OF SKIN OF NOSE: Status: ACTIVE | Noted: 2023-10-30

## 2023-10-30 PROCEDURE — 77401 RADIATION TX DELIVERY SUPFC: CPT

## 2023-10-30 PROCEDURE — OTHER IMAGE GUIDED - SUPERFICIAL RADIOTHERAPY: OTHER

## 2023-10-30 PROCEDURE — 77280 THER RAD SIMULAJ FIELD SMPL: CPT

## 2023-10-30 PROCEDURE — OTHER IMAGE GUIDED - SUPERFICIAL RADIOTHERAPY: TREATMENT VISIT: OTHER

## 2023-10-30 NOTE — PROCEDURE: IMAGE GUIDED - SUPERFICIAL RADIOTHERAPY: TREATMENT VISIT
Show Ultrasound In Note?: No
Treatment Documentation: This patient has been treated today with image-guided superficial radiation therapy for non-melanoma skin cancer. Written informed consent has been previously obtained from this patient for this treatment. This consent is documented in the patient’s chart. The patient gave verbal consent to continue treatment today. The patient was treated with a specific radiation dose and setup as prescribed by the provider listed on this visit note. A Radiation Therapist performed administration of radiation under the supervision of a provider. The treatment parameters and cumulative dose are indicated above. Prior to administering the radiation, the patient underwent a verification therapeutic radiology simulation-aided field setting defining relevant normal and abnormal target anatomy in addition to data necessary to develop an optimal radiation treatment process for the patient. The field placement simulation documents any change seen in overall tumor volume documented in the patient’s record, allows the clinician to indicate any needed changes in the treatment plan and/or prescription, provides diagnostic evaluation as the basis for performing the therapeutic procedure, and clearly identifies the information needed to decide to proceed with the therapeutic procedure. This process includes verification of the treatment port(s) and proper treatment positioning. All treatment ports were photographed within electronic medical records. The patient’s lead blocking along with gross tumor volume and margin was confirmed. Considering superficial radiotherapy is clinical in setup, this requires the physician and radiation therapist to clarify the location interest being treated against initial images, pathology, and patient anatomy. Care was taken to ensure bruner treated were geometrically accurate and properly positioned using therapeutic radiology simulation-aided field setting verification per fraction. This process is also utilized to determine if any prescription or setup changes are necessary. These steps are therefore medically necessary to ensure safe and effective administration of radiation. Ongoing therapeutic radiology simulation-aided field setting verification is ordered throughout the course of therapy. \\n\\nPer Dr. Waggoner continued ultrasound guidance will continue on OTV treatment days, and 2 follow up visits, which is required for, measurement of tumor depth, width, breadth, tumor progress, whether to proceed with therapeutic delivery, and acute effect monitoring.
Prescription Used: 2
Bill For Ultrasound ()?: Yes
Additional Comments (Add Customization Of Note Here): Patient will only be in for 1 treatment this week and next week, switched to RX 2. \\nSamples sent home:\\n\\nCetaphil Healing ointment \\nLa Roche-Posay  Anthelios Clear Skin 60 SPF\\nNeutrogena 70 SPF
Fraction Number: 10
Energy (Kv): 100
Calculate Total Cumulative Dose Automatically Or Manually: Manually
Daily Dosage (Cgy): 279.40
Ultrasound Used Text: High frequency ultrasound depth is mm, which is mm in difference from previous imaging.
Total Cumulative Dose (Cgy): 2736.85
Ultrasound Not Used Text: Ultrasound was not performed today due to

## 2023-11-04 ENCOUNTER — HEALTH MAINTENANCE LETTER (OUTPATIENT)
Age: 73
End: 2023-11-04

## 2023-11-08 ENCOUNTER — APPOINTMENT (OUTPATIENT)
Dept: URBAN - METROPOLITAN AREA CLINIC 255 | Age: 73
Setting detail: DERMATOLOGY
End: 2023-11-11

## 2023-11-08 PROBLEM — C44.311 BASAL CELL CARCINOMA OF SKIN OF NOSE: Status: ACTIVE | Noted: 2023-11-08

## 2023-11-08 PROCEDURE — 77280 THER RAD SIMULAJ FIELD SMPL: CPT

## 2023-11-08 PROCEDURE — 77401 RADIATION TX DELIVERY SUPFC: CPT

## 2023-11-08 PROCEDURE — OTHER IMAGE GUIDED - SUPERFICIAL RADIOTHERAPY: OTHER

## 2023-11-08 PROCEDURE — OTHER IMAGE GUIDED - SUPERFICIAL RADIOTHERAPY: TREATMENT VISIT: OTHER

## 2023-11-08 NOTE — PROCEDURE: IMAGE GUIDED - SUPERFICIAL RADIOTHERAPY: TREATMENT VISIT
Show Ultrasound In Note?: No
Treatment Documentation: This patient has been treated today with image-guided superficial radiation therapy for non-melanoma skin cancer. Written informed consent has been previously obtained from this patient for this treatment. This consent is documented in the patient’s chart. The patient gave verbal consent to continue treatment today. The patient was treated with a specific radiation dose and setup as prescribed by the provider listed on this visit note. A Radiation Therapist performed administration of radiation under the supervision of a provider. The treatment parameters and cumulative dose are indicated above. Prior to administering the radiation, the patient underwent a verification therapeutic radiology simulation-aided field setting defining relevant normal and abnormal target anatomy in addition to data necessary to develop an optimal radiation treatment process for the patient. The field placement simulation documents any change seen in overall tumor volume documented in the patient’s record, allows the clinician to indicate any needed changes in the treatment plan and/or prescription, provides diagnostic evaluation as the basis for performing the therapeutic procedure, and clearly identifies the information needed to decide to proceed with the therapeutic procedure. This process includes verification of the treatment port(s) and proper treatment positioning. All treatment ports were photographed within electronic medical records. The patient’s lead blocking along with gross tumor volume and margin was confirmed. Considering superficial radiotherapy is clinical in setup, this requires the physician and radiation therapist to clarify the location interest being treated against initial images, pathology, and patient anatomy. Care was taken to ensure bruner treated were geometrically accurate and properly positioned using therapeutic radiology simulation-aided field setting verification per fraction. This process is also utilized to determine if any prescription or setup changes are necessary. These steps are therefore medically necessary to ensure safe and effective administration of radiation. Ongoing therapeutic radiology simulation-aided field setting verification is ordered throughout the course of therapy. \\n\\nPer Dr. Waggoner continued ultrasound guidance will continue on OTV treatment days, and 2 follow up visits, which is required for, measurement of tumor depth, width, breadth, tumor progress, whether to proceed with therapeutic delivery, and acute effect monitoring.
Prescription Used: 2
Bill For Ultrasound ()?: Yes
Additional Comments (Add Customization Of Note Here): Lefty noted today that the vault of his nose is tender, he just recently started putting Vaseline on the inside of his nose, i suggested he continue doing that throughout the day as well as on the skin surface. He did note that if he blows his nose, he will get a little bleeding from the right intranasal vault.
Fraction Number: 11
Energy (Kv): 100
Calculate Total Cumulative Dose Automatically Or Manually: Manually
Daily Dosage (Cgy): 279.40
Ultrasound Used Text: High frequency ultrasound depth is mm, which is mm in difference from previous imaging.
Total Cumulative Dose (Cgy): 3016.25
Ultrasound Not Used Text: Ultrasound was not performed today due to

## 2023-11-13 ENCOUNTER — APPOINTMENT (OUTPATIENT)
Dept: URBAN - METROPOLITAN AREA CLINIC 255 | Age: 73
Setting detail: DERMATOLOGY
End: 2023-11-15

## 2023-11-13 PROBLEM — C44.311 BASAL CELL CARCINOMA OF SKIN OF NOSE: Status: ACTIVE | Noted: 2023-11-13

## 2023-11-13 PROCEDURE — OTHER IMAGE GUIDED - SUPERFICIAL RADIOTHERAPY: OTHER

## 2023-11-13 PROCEDURE — 77401 RADIATION TX DELIVERY SUPFC: CPT

## 2023-11-13 PROCEDURE — 77280 THER RAD SIMULAJ FIELD SMPL: CPT

## 2023-11-13 PROCEDURE — OTHER IMAGE GUIDED - SUPERFICIAL RADIOTHERAPY: TREATMENT VISIT: OTHER

## 2023-11-13 NOTE — PROCEDURE: IMAGE GUIDED - SUPERFICIAL RADIOTHERAPY: TREATMENT VISIT
Show Ultrasound In Note?: No
Treatment Documentation: This patient has been treated today with image-guided superficial radiation therapy for non-melanoma skin cancer. Written informed consent has been previously obtained from this patient for this treatment. This consent is documented in the patient’s chart. The patient gave verbal consent to continue treatment today. The patient was treated with a specific radiation dose and setup as prescribed by the provider listed on this visit note. A Radiation Therapist performed administration of radiation under the supervision of a provider. The treatment parameters and cumulative dose are indicated above. Prior to administering the radiation, the patient underwent a verification therapeutic radiology simulation-aided field setting defining relevant normal and abnormal target anatomy in addition to data necessary to develop an optimal radiation treatment process for the patient. The field placement simulation documents any change seen in overall tumor volume documented in the patient’s record, allows the clinician to indicate any needed changes in the treatment plan and/or prescription, provides diagnostic evaluation as the basis for performing the therapeutic procedure, and clearly identifies the information needed to decide to proceed with the therapeutic procedure. This process includes verification of the treatment port(s) and proper treatment positioning. All treatment ports were photographed within electronic medical records. The patient’s lead blocking along with gross tumor volume and margin was confirmed. Considering superficial radiotherapy is clinical in setup, this requires the physician and radiation therapist to clarify the location interest being treated against initial images, pathology, and patient anatomy. Care was taken to ensure bruner treated were geometrically accurate and properly positioned using therapeutic radiology simulation-aided field setting verification per fraction. This process is also utilized to determine if any prescription or setup changes are necessary. These steps are therefore medically necessary to ensure safe and effective administration of radiation. Ongoing therapeutic radiology simulation-aided field setting verification is ordered throughout the course of therapy. \\n\\nPer Dr. Waggoner continued ultrasound guidance will continue on OTV treatment days, and 2 follow up visits, which is required for, measurement of tumor depth, width, breadth, tumor progress, whether to proceed with therapeutic delivery, and acute effect monitoring.
Prescription Used: 1
Bill For Ultrasound ()?: Yes
Fraction Number: 12
Energy (Kv): 100
Calculate Total Cumulative Dose Automatically Or Manually: Manually
Daily Dosage (Cgy): 273.05
Ultrasound Used Text: High frequency ultrasound depth is mm, which is mm in difference from previous imaging.
Total Cumulative Dose (Cgy): 3289.30
Ultrasound Not Used Text: Ultrasound was not performed today due to

## 2023-11-15 ENCOUNTER — APPOINTMENT (OUTPATIENT)
Dept: URBAN - METROPOLITAN AREA CLINIC 255 | Age: 73
Setting detail: DERMATOLOGY
End: 2023-11-15

## 2023-11-15 PROBLEM — C44.311 BASAL CELL CARCINOMA OF SKIN OF NOSE: Status: ACTIVE | Noted: 2023-11-15

## 2023-11-15 PROCEDURE — 77401 RADIATION TX DELIVERY SUPFC: CPT

## 2023-11-15 PROCEDURE — OTHER IMAGE GUIDED - SUPERFICIAL RADIOTHERAPY: TREATMENT VISIT: OTHER

## 2023-11-15 PROCEDURE — OTHER IMAGE GUIDED - SUPERFICIAL RADIOTHERAPY: OTHER

## 2023-11-15 PROCEDURE — 77280 THER RAD SIMULAJ FIELD SMPL: CPT

## 2023-11-15 NOTE — PROCEDURE: IMAGE GUIDED - SUPERFICIAL RADIOTHERAPY: TREATMENT VISIT
Show Ultrasound In Note?: No
Treatment Documentation: This patient has been treated today with image-guided superficial radiation therapy for non-melanoma skin cancer. Written informed consent has been previously obtained from this patient for this treatment. This consent is documented in the patient’s chart. The patient gave verbal consent to continue treatment today. The patient was treated with a specific radiation dose and setup as prescribed by the provider listed on this visit note. A Radiation Therapist performed administration of radiation under the supervision of a provider. The treatment parameters and cumulative dose are indicated above. Prior to administering the radiation, the patient underwent a verification therapeutic radiology simulation-aided field setting defining relevant normal and abnormal target anatomy in addition to data necessary to develop an optimal radiation treatment process for the patient. The field placement simulation documents any change seen in overall tumor volume documented in the patient’s record, allows the clinician to indicate any needed changes in the treatment plan and/or prescription, provides diagnostic evaluation as the basis for performing the therapeutic procedure, and clearly identifies the information needed to decide to proceed with the therapeutic procedure. This process includes verification of the treatment port(s) and proper treatment positioning. All treatment ports were photographed within electronic medical records. The patient’s lead blocking along with gross tumor volume and margin was confirmed. Considering superficial radiotherapy is clinical in setup, this requires the physician and radiation therapist to clarify the location interest being treated against initial images, pathology, and patient anatomy. Care was taken to ensure bruner treated were geometrically accurate and properly positioned using therapeutic radiology simulation-aided field setting verification per fraction. This process is also utilized to determine if any prescription or setup changes are necessary. These steps are therefore medically necessary to ensure safe and effective administration of radiation. Ongoing therapeutic radiology simulation-aided field setting verification is ordered throughout the course of therapy. \\n\\nPer Dr. Waggoner continued ultrasound guidance will continue on OTV treatment days, and 2 follow up visits, which is required for, measurement of tumor depth, width, breadth, tumor progress, whether to proceed with therapeutic delivery, and acute effect monitoring.
Prescription Used: 1
Bill For Ultrasound ()?: Yes
Additional Comments (Add Customization Of Note Here): Lefty noted that he started applying the healing ointment and it has helped. I encouraged him to use it throughout the day and at night.
Fraction Number: 13
Energy (Kv): 100
Calculate Total Cumulative Dose Automatically Or Manually: Manually
Daily Dosage (Cgy): 273.05
Ultrasound Used Text: High frequency ultrasound depth is mm, which is mm in difference from previous imaging.
Total Cumulative Dose (Cgy): 3562.35
Ultrasound Not Used Text: Ultrasound was not performed today due to

## 2023-11-16 ENCOUNTER — APPOINTMENT (OUTPATIENT)
Dept: URBAN - METROPOLITAN AREA CLINIC 255 | Age: 73
Setting detail: DERMATOLOGY
End: 2023-11-16

## 2023-11-16 PROBLEM — C44.311 BASAL CELL CARCINOMA OF SKIN OF NOSE: Status: ACTIVE | Noted: 2023-11-16

## 2023-11-16 PROCEDURE — OTHER IMAGE GUIDED - SUPERFICIAL RADIOTHERAPY: TREATMENT VISIT: OTHER

## 2023-11-16 PROCEDURE — 77427 RADIATION TX MANAGEMENT X5: CPT

## 2023-11-16 PROCEDURE — OTHER IMAGE GUIDED - SUPERFICIAL RADIOTHERAPY: OTHER

## 2023-11-16 PROCEDURE — G6001 ECHO GUIDANCE RADIOTHERAPY: HCPCS | Mod: GA

## 2023-11-16 PROCEDURE — OTHER IMAGE GUIDED - SUPERFICIAL RADIOTHERAPY: EVALUATION VISIT: OTHER

## 2023-11-16 PROCEDURE — 77280 THER RAD SIMULAJ FIELD SMPL: CPT

## 2023-11-16 PROCEDURE — 77401 RADIATION TX DELIVERY SUPFC: CPT

## 2023-11-16 NOTE — PROCEDURE: IMAGE GUIDED - SUPERFICIAL RADIOTHERAPY: TREATMENT VISIT
Show Ultrasound In Note?: Yes
Treatment Documentation: This patient has been treated today with image-guided superficial radiation therapy for non-melanoma skin cancer. Written informed consent has been previously obtained from this patient for this treatment. This consent is documented in the patient’s chart. The patient gave verbal consent to continue treatment today. The patient was treated with a specific radiation dose and setup as prescribed by the provider listed on this visit note. A Radiation Therapist performed administration of radiation under the supervision of a provider. The treatment parameters and cumulative dose are indicated above. Prior to administering the radiation, the patient underwent a verification therapeutic radiology simulation-aided field setting defining relevant normal and abnormal target anatomy in addition to data necessary to develop an optimal radiation treatment process for the patient. The field placement simulation documents any change seen in overall tumor volume documented in the patient’s record, allows the clinician to indicate any needed changes in the treatment plan and/or prescription, provides diagnostic evaluation as the basis for performing the therapeutic procedure, and clearly identifies the information needed to decide to proceed with the therapeutic procedure. This process includes verification of the treatment port(s) and proper treatment positioning. All treatment ports were photographed within electronic medical records. The patient’s lead blocking along with gross tumor volume and margin was confirmed. Considering superficial radiotherapy is clinical in setup, this requires the physician and radiation therapist to clarify the location interest being treated against initial images, pathology, and patient anatomy. Care was taken to ensure bruner treated were geometrically accurate and properly positioned using therapeutic radiology simulation-aided field setting verification per fraction. This process is also utilized to determine if any prescription or setup changes are necessary. These steps are therefore medically necessary to ensure safe and effective administration of radiation. Ongoing therapeutic radiology simulation-aided field setting verification is ordered throughout the course of therapy. \\n\\nPer Dr. Waggoner continued ultrasound guidance will continue on OTV treatment days, and 2 follow up visits, which is required for, measurement of tumor depth, width, breadth, tumor progress, whether to proceed with therapeutic delivery, and acute effect monitoring.
Prescription Used: 1
Additional Change To Daily Dosage Administered Mid Treatment?: No
Fraction Number: 14
Energy (Kv): 100
Calculate Total Cumulative Dose Automatically Or Manually: Manually
Daily Dosage (Cgy): 273.05
Ultrasound Used Text: High frequency ultrasound depth is 1.72 mm, which is 0.10 mm in difference from previous imaging.
Total Cumulative Dose (Cgy): 3835.40
Ultrasound Not Used Text: Ultrasound was not performed today due to

## 2023-11-16 NOTE — PROCEDURE: IMAGE GUIDED - SUPERFICIAL RADIOTHERAPY: EVALUATION VISIT
Evaluation Plan: The patient is undergoing superficial radiation therapy for skin cancer and presents for weekly evaluation and management. Per protocol and as documented on the flow sheet, the patient was questioned as to subjective redness, pruritus, pain, drainage, fatigue, or any other symptoms. Objectively, the radiation area was evaluated with regards to erythema, atrophy, scale, crusting, erosion, ulceration, edema, purpura, tenderness, warmth, drainage, and any other findings. The plan was extensively reviewed including dose and dosing schedule. The simulation and clinical setup were also reviewed as were external and any internal shields and based on this review the appropriateness and sufficiency of treatment was determined. \\n\\nA high-frequency ultrasound image was acquired today for a two-dimensional evaluation of the tumor volume, depth, width, breadth, review of prior response to treatment, provide geometric accuracy of field placement, and determine whether to proceed with therapeutic delivery.  \\n \\nA total of 7 ultrasounds are being prescribed; 1 ultrasound on initial Simulation Day, 1 at each of the 4 OTVS, and 1 at each of the follow-up appointment to be determined near the end of the treatment course.
Show Ultrasound In Note?: No
Is This Visit For Evaluation During Treatment Or Follow Up Post Treatment?: evaluation
Bill For Evaluation ([id number])?: Yes
Radiation Therapy Oncology Group (Rtog) Score: 1
Ultrasound Not Used Text: Ultrasound was not performed today due to
Ultrasound Used Text: Ultrasound depth is 1.82 mm.
Assessment: Appropriate reaction

## 2023-11-18 ENCOUNTER — APPOINTMENT (OUTPATIENT)
Dept: URBAN - METROPOLITAN AREA CLINIC 255 | Age: 73
Setting detail: DERMATOLOGY
End: 2023-12-26

## 2023-11-18 PROBLEM — C44.311 BASAL CELL CARCINOMA OF SKIN OF NOSE: Status: ACTIVE | Noted: 2023-11-18

## 2023-11-18 PROCEDURE — 77336 RADIATION PHYSICS CONSULT: CPT

## 2023-11-18 PROCEDURE — OTHER IMAGE GUIDED - SUPERFICIAL RADIOTHERAPY: OTHER

## 2023-11-20 ENCOUNTER — APPOINTMENT (OUTPATIENT)
Dept: URBAN - METROPOLITAN AREA CLINIC 255 | Age: 73
Setting detail: DERMATOLOGY
End: 2023-11-20

## 2023-11-20 PROBLEM — C44.311 BASAL CELL CARCINOMA OF SKIN OF NOSE: Status: ACTIVE | Noted: 2023-11-20

## 2023-11-20 PROCEDURE — OTHER IMAGE GUIDED - SUPERFICIAL RADIOTHERAPY: TREATMENT VISIT: OTHER

## 2023-11-20 PROCEDURE — 77401 RADIATION TX DELIVERY SUPFC: CPT

## 2023-11-20 PROCEDURE — 77280 THER RAD SIMULAJ FIELD SMPL: CPT

## 2023-11-20 PROCEDURE — OTHER IMAGE GUIDED - SUPERFICIAL RADIOTHERAPY: OTHER

## 2023-11-20 NOTE — PROCEDURE: IMAGE GUIDED - SUPERFICIAL RADIOTHERAPY: TREATMENT VISIT
Show Ultrasound In Note?: No
Treatment Documentation: This patient has been treated today with image-guided superficial radiation therapy for non-melanoma skin cancer. Written informed consent has been previously obtained from this patient for this treatment. This consent is documented in the patient’s chart. The patient gave verbal consent to continue treatment today. The patient was treated with a specific radiation dose and setup as prescribed by the provider listed on this visit note. A Radiation Therapist performed administration of radiation under the supervision of a provider. The treatment parameters and cumulative dose are indicated above. Prior to administering the radiation, the patient underwent a verification therapeutic radiology simulation-aided field setting defining relevant normal and abnormal target anatomy in addition to data necessary to develop an optimal radiation treatment process for the patient. The field placement simulation documents any change seen in overall tumor volume documented in the patient’s record, allows the clinician to indicate any needed changes in the treatment plan and/or prescription, provides diagnostic evaluation as the basis for performing the therapeutic procedure, and clearly identifies the information needed to decide to proceed with the therapeutic procedure. This process includes verification of the treatment port(s) and proper treatment positioning. All treatment ports were photographed within electronic medical records. The patient’s lead blocking along with gross tumor volume and margin was confirmed. Considering superficial radiotherapy is clinical in setup, this requires the physician and radiation therapist to clarify the location interest being treated against initial images, pathology, and patient anatomy. Care was taken to ensure bruner treated were geometrically accurate and properly positioned using therapeutic radiology simulation-aided field setting verification per fraction. This process is also utilized to determine if any prescription or setup changes are necessary. These steps are therefore medically necessary to ensure safe and effective administration of radiation. Ongoing therapeutic radiology simulation-aided field setting verification is ordered throughout the course of therapy. \\n\\nPer Dr. Waggoner continued ultrasound guidance will continue on OTV treatment days, and 2 follow up visits, which is required for, measurement of tumor depth, width, breadth, tumor progress, whether to proceed with therapeutic delivery, and acute effect monitoring.
Prescription Used: 1
Bill For Ultrasound ()?: Yes
Additional Comments (Add Customization Of Note Here): Lefty did not have any questions or concerns, he is tolerating treatment well.
Fraction Number: 15
Energy (Kv): 100
Calculate Total Cumulative Dose Automatically Or Manually: Manually
Daily Dosage (Cgy): 273.05
Ultrasound Used Text: High frequency ultrasound depth is 1.72 mm, which is 0.10 mm in difference from previous imaging.
Total Cumulative Dose (Cgy): 4108.45
Ultrasound Not Used Text: Ultrasound was not performed today due to

## 2023-11-21 ENCOUNTER — APPOINTMENT (OUTPATIENT)
Dept: URBAN - METROPOLITAN AREA CLINIC 255 | Age: 73
Setting detail: DERMATOLOGY
End: 2023-11-21

## 2023-11-21 PROBLEM — C44.311 BASAL CELL CARCINOMA OF SKIN OF NOSE: Status: ACTIVE | Noted: 2023-11-21

## 2023-11-21 PROCEDURE — OTHER IMAGE GUIDED - SUPERFICIAL RADIOTHERAPY: OTHER

## 2023-11-21 PROCEDURE — 77401 RADIATION TX DELIVERY SUPFC: CPT

## 2023-11-21 PROCEDURE — OTHER IMAGE GUIDED - SUPERFICIAL RADIOTHERAPY: TREATMENT VISIT: OTHER

## 2023-11-21 PROCEDURE — 77280 THER RAD SIMULAJ FIELD SMPL: CPT

## 2023-11-21 NOTE — PROCEDURE: IMAGE GUIDED - SUPERFICIAL RADIOTHERAPY: TREATMENT VISIT
Additional Comments (Add Customization Of Note Here): Lefty asked if the radiation could be causing some congestion, he noted that he is sniffling a lot throughout the day and night. I told him it is not impossible that we could be causing a little, but not likely as we are treating the nasal tip. I noted that he asked about this at the time of simulation and at that time stated he thinks it could be from the flowers at work, he is taking Nyquil decongestant and noting that it is not helping much. I suggested he try another allergy medication or decongestant, as the amount of congestion/sniffling he is noting is not likely from the radiation, as well as he was having this prior to starting IGSRT. Lefty agreed it is likely from flowers are work or some allergies even though he does not have seasonal allergies.

## 2023-11-21 NOTE — PROCEDURE: IMAGE GUIDED - SUPERFICIAL RADIOTHERAPY: TREATMENT VISIT
Treatment Documentation: This patient has been treated today with image-guided superficial radiation therapy for non-melanoma skin cancer. Written informed consent has been previously obtained from this patient for this treatment. This consent is documented in the patient’s chart. The patient gave verbal consent to continue treatment today. The patient was treated with a specific radiation dose and setup as prescribed by the provider listed on this visit note. A Radiation Therapist performed administration of radiation under the supervision of a provider. The treatment parameters and cumulative dose are indicated above. Prior to administering the radiation, the patient underwent a verification therapeutic radiology simulation-aided field setting defining relevant normal and abnormal target anatomy in addition to data necessary to develop an optimal radiation treatment process for the patient. The field placement simulation documents any change seen in overall tumor volume documented in the patient’s record, allows the clinician to indicate any needed changes in the treatment plan and/or prescription, provides diagnostic evaluation as the basis for performing the therapeutic procedure, and clearly identifies the information needed to decide to proceed with the therapeutic procedure. This process includes verification of the treatment port(s) and proper treatment positioning. All treatment ports were photographed within electronic medical records. The patient’s lead blocking along with gross tumor volume and margin was confirmed. Considering superficial radiotherapy is clinical in setup, this requires the physician and radiation therapist to clarify the location interest being treated against initial images, pathology, and patient anatomy. Care was taken to ensure bruner treated were geometrically accurate and properly positioned using therapeutic radiology simulation-aided field setting verification per fraction. This process is also utilized to determine if any prescription or setup changes are necessary. These steps are therefore medically necessary to ensure safe and effective administration of radiation. Ongoing therapeutic radiology simulation-aided field setting verification is ordered throughout the course of therapy. \\n\\nPer Dr. Waggnoer continued ultrasound guidance will continue on OTV treatment days, and 2 follow up visits, which is required for, measurement of tumor depth, width, breadth, tumor progress, whether to proceed with therapeutic delivery, and acute effect monitoring. Treatment Documentation: This patient has been treated today with image-guided superficial radiation therapy for non-melanoma skin cancer. Written informed consent has been previously obtained from this patient for this treatment. This consent is documented in the patient’s chart. The patient gave verbal consent to continue treatment today. The patient was treated with a specific radiation dose and setup as prescribed by the provider listed on this visit note. A Radiation Therapist performed administration of radiation under the supervision of a provider. The treatment parameters and cumulative dose are indicated above. Prior to administering the radiation, the patient underwent a verification therapeutic radiology simulation-aided field setting defining relevant normal and abnormal target anatomy in addition to data necessary to develop an optimal radiation treatment process for the patient. The field placement simulation documents any change seen in overall tumor volume documented in the patient’s record, allows the clinician to indicate any needed changes in the treatment plan and/or prescription, provides diagnostic evaluation as the basis for performing the therapeutic procedure, and clearly identifies the information needed to decide to proceed with the therapeutic procedure. This process includes verification of the treatment port(s) and proper treatment positioning. All treatment ports were photographed within electronic medical records. The patient’s lead blocking along with gross tumor volume and margin was confirmed. Considering superficial radiotherapy is clinical in setup, this requires the physician and radiation therapist to clarify the location interest being treated against initial images, pathology, and patient anatomy. Care was taken to ensure bruner treated were geometrically accurate and properly positioned using therapeutic radiology simulation-aided field setting verification per fraction. This process is also utilized to determine if any prescription or setup changes are necessary. These steps are therefore medically necessary to ensure safe and effective administration of radiation. Ongoing therapeutic radiology simulation-aided field setting verification is ordered throughout the course of therapy. \\n\\nPer Dr. Waggoner continued ultrasound guidance will continue on OTV treatment days, and 2 follow up visits, which is required for, measurement of tumor depth, width, breadth, tumor progress, whether to proceed with therapeutic delivery, and acute effect monitoring.

## 2023-11-21 NOTE — PROCEDURE: IMAGE GUIDED - SUPERFICIAL RADIOTHERAPY: TREATMENT VISIT
Ultrasound Used Text: High frequency ultrasound depth is 1.72 mm, which is 0.10 mm in difference from previous imaging.

## 2023-11-22 ENCOUNTER — APPOINTMENT (OUTPATIENT)
Dept: URBAN - METROPOLITAN AREA CLINIC 255 | Age: 73
Setting detail: DERMATOLOGY
End: 2023-11-22

## 2023-11-22 PROBLEM — C44.311 BASAL CELL CARCINOMA OF SKIN OF NOSE: Status: ACTIVE | Noted: 2023-11-22

## 2023-11-22 PROCEDURE — OTHER IMAGE GUIDED - SUPERFICIAL RADIOTHERAPY: TREATMENT VISIT: OTHER

## 2023-11-22 PROCEDURE — 77401 RADIATION TX DELIVERY SUPFC: CPT

## 2023-11-22 PROCEDURE — 77280 THER RAD SIMULAJ FIELD SMPL: CPT

## 2023-11-22 PROCEDURE — OTHER IMAGE GUIDED - SUPERFICIAL RADIOTHERAPY: OTHER

## 2023-11-22 NOTE — PROCEDURE: IMAGE GUIDED - SUPERFICIAL RADIOTHERAPY: TREATMENT VISIT
Treatment Documentation: This patient has been treated today with image-guided superficial radiation therapy for non-melanoma skin cancer. Written informed consent has been previously obtained from this patient for this treatment. This consent is documented in the patient’s chart. The patient gave verbal consent to continue treatment today. The patient was treated with a specific radiation dose and setup as prescribed by the provider listed on this visit note. A Radiation Therapist performed administration of radiation under the supervision of a provider. The treatment parameters and cumulative dose are indicated above. Prior to administering the radiation, the patient underwent a verification therapeutic radiology simulation-aided field setting defining relevant normal and abnormal target anatomy in addition to data necessary to develop an optimal radiation treatment process for the patient. The field placement simulation documents any change seen in overall tumor volume documented in the patient’s record, allows the clinician to indicate any needed changes in the treatment plan and/or prescription, provides diagnostic evaluation as the basis for performing the therapeutic procedure, and clearly identifies the information needed to decide to proceed with the therapeutic procedure. This process includes verification of the treatment port(s) and proper treatment positioning. All treatment ports were photographed within electronic medical records. The patient’s lead blocking along with gross tumor volume and margin was confirmed. Considering superficial radiotherapy is clinical in setup, this requires the physician and radiation therapist to clarify the location interest being treated against initial images, pathology, and patient anatomy. Care was taken to ensure brunre treated were geometrically accurate and properly positioned using therapeutic radiology simulation-aided field setting verification per fraction. This process is also utilized to determine if any prescription or setup changes are necessary. These steps are therefore medically necessary to ensure safe and effective administration of radiation. Ongoing therapeutic radiology simulation-aided field setting verification is ordered throughout the course of therapy. \\n\\nPer Dr. Waggoner continued ultrasound guidance will continue on OTV treatment days, and 2 follow up visits, which is required for, measurement of tumor depth, width, breadth, tumor progress, whether to proceed with therapeutic delivery, and acute effect monitoring. Treatment Documentation: This patient has been treated today with image-guided superficial radiation therapy for non-melanoma skin cancer. Written informed consent has been previously obtained from this patient for this treatment. This consent is documented in the patient’s chart. The patient gave verbal consent to continue treatment today. The patient was treated with a specific radiation dose and setup as prescribed by the provider listed on this visit note. A Radiation Therapist performed administration of radiation under the supervision of a provider. The treatment parameters and cumulative dose are indicated above. Prior to administering the radiation, the patient underwent a verification therapeutic radiology simulation-aided field setting defining relevant normal and abnormal target anatomy in addition to data necessary to develop an optimal radiation treatment process for the patient. The field placement simulation documents any change seen in overall tumor volume documented in the patient’s record, allows the clinician to indicate any needed changes in the treatment plan and/or prescription, provides diagnostic evaluation as the basis for performing the therapeutic procedure, and clearly identifies the information needed to decide to proceed with the therapeutic procedure. This process includes verification of the treatment port(s) and proper treatment positioning. All treatment ports were photographed within electronic medical records. The patient’s lead blocking along with gross tumor volume and margin was confirmed. Considering superficial radiotherapy is clinical in setup, this requires the physician and radiation therapist to clarify the location interest being treated against initial images, pathology, and patient anatomy. Care was taken to ensure bruner treated were geometrically accurate and properly positioned using therapeutic radiology simulation-aided field setting verification per fraction. This process is also utilized to determine if any prescription or setup changes are necessary. These steps are therefore medically necessary to ensure safe and effective administration of radiation. Ongoing therapeutic radiology simulation-aided field setting verification is ordered throughout the course of therapy. \\n\\nPer Dr. Waggoner continued ultrasound guidance will continue on OTV treatment days, and 2 follow up visits, which is required for, measurement of tumor depth, width, breadth, tumor progress, whether to proceed with therapeutic delivery, and acute effect monitoring.

## 2023-11-22 NOTE — PROCEDURE: IMAGE GUIDED - SUPERFICIAL RADIOTHERAPY: TREATMENT VISIT
Additional Comments (Add Customization Of Note Here): Lefty did not have any questions or concerns prior to treatment today.

## 2023-11-27 ENCOUNTER — APPOINTMENT (OUTPATIENT)
Dept: URBAN - METROPOLITAN AREA CLINIC 255 | Age: 73
Setting detail: DERMATOLOGY
End: 2023-11-27

## 2023-11-27 PROBLEM — C44.311 BASAL CELL CARCINOMA OF SKIN OF NOSE: Status: ACTIVE | Noted: 2023-11-27

## 2023-11-27 PROCEDURE — OTHER IMAGE GUIDED - SUPERFICIAL RADIOTHERAPY: TREATMENT VISIT: OTHER

## 2023-11-27 PROCEDURE — 77280 THER RAD SIMULAJ FIELD SMPL: CPT

## 2023-11-27 PROCEDURE — 77401 RADIATION TX DELIVERY SUPFC: CPT

## 2023-11-27 PROCEDURE — OTHER IMAGE GUIDED - SUPERFICIAL RADIOTHERAPY: OTHER

## 2023-11-27 NOTE — PROCEDURE: IMAGE GUIDED - SUPERFICIAL RADIOTHERAPY: TREATMENT VISIT
Show Ultrasound In Note?: No
Treatment Documentation: This patient has been treated today with image-guided superficial radiation therapy for non-melanoma skin cancer. Written informed consent has been previously obtained from this patient for this treatment. This consent is documented in the patient’s chart. The patient gave verbal consent to continue treatment today. The patient was treated with a specific radiation dose and setup as prescribed by the provider listed on this visit note. A Radiation Therapist performed administration of radiation under the supervision of a provider. The treatment parameters and cumulative dose are indicated above. Prior to administering the radiation, the patient underwent a verification therapeutic radiology simulation-aided field setting defining relevant normal and abnormal target anatomy in addition to data necessary to develop an optimal radiation treatment process for the patient. The field placement simulation documents any change seen in overall tumor volume documented in the patient’s record, allows the clinician to indicate any needed changes in the treatment plan and/or prescription, provides diagnostic evaluation as the basis for performing the therapeutic procedure, and clearly identifies the information needed to decide to proceed with the therapeutic procedure. This process includes verification of the treatment port(s) and proper treatment positioning. All treatment ports were photographed within electronic medical records. The patient’s lead blocking along with gross tumor volume and margin was confirmed. Considering superficial radiotherapy is clinical in setup, this requires the physician and radiation therapist to clarify the location interest being treated against initial images, pathology, and patient anatomy. Care was taken to ensure bruner treated were geometrically accurate and properly positioned using therapeutic radiology simulation-aided field setting verification per fraction. This process is also utilized to determine if any prescription or setup changes are necessary. These steps are therefore medically necessary to ensure safe and effective administration of radiation. Ongoing therapeutic radiology simulation-aided field setting verification is ordered throughout the course of therapy. \\n\\nPer Dr. Waggoner continued ultrasound guidance will continue on OTV treatment days, and 2 follow up visits, which is required for, measurement of tumor depth, width, breadth, tumor progress, whether to proceed with therapeutic delivery, and acute effect monitoring.
Prescription Used: 1
Bill For Ultrasound Evaluation (): Yes
Additional Comments (Add Customization Of Note Here): Lefty did not have any questions or concerns prior to treatment today.
Fraction Number: 18
Energy (Kv): 100
Calculate Total Cumulative Dose Automatically Or Manually: Manually
Daily Dosage (Cgy): 273.05
Ultrasound Used Text: High frequency ultrasound depth is 1.72 mm, which is 0.10 mm in difference from previous imaging.
Total Cumulative Dose (Cgy): 4927.60
Ultrasound Not Used Text: Ultrasound was not performed today due to

## 2023-11-27 NOTE — PROCEDURE: IMAGE GUIDED - SUPERFICIAL RADIOTHERAPY
Body Location Override (Optional): Nasal Supratip
Problem List Items Addressed This Visit     Amnestic MCI (mild cognitive impairment with memory loss)     Patient does have some issues with regard to memory loss  It was recommended by Neurology previously to have a MRI with neuro quant  Would recommend that he have that done in the near future  Also trying limit risk factors for further CVA  This would include limiting alcohol  Relevant Orders    Ambulatory referral to Geriatrics    Comprehensive metabolic panel    Lipid Panel with Direct LDL reflex    TSH, 3rd generation    RPR    Vitamin B12    Folate    B12 deficiency     Patient's B12 level was low in the past   He has been on B12 in the past, but I would recommend that we recheck that  Has been quite a while since his last          Cerebrovascular accident (CVA) due to bilateral thrombosis of middle cerebral arteries ([facility] [address] ) - Primary     CVA noted previously  Recommend MRI as per Neurology visit in 2019  He has not had any further follow-up on that  Ask to reprinted the order, and then re-evaluate after the MRI  Relevant Orders    Ambulatory referral to Geriatrics    Comprehensive metabolic panel    Lipid Panel with Direct LDL reflex    TSH, 3rd generation    Elevated prostate specific antigen (PSA)     PSA is slightly elevated before  Would recommend recheck  Relevant Orders    PSA, Total Screen    Hyperlipidemia     Last cholesterol was slightly elevated, especially given the risks that he currently has  Will need to re-evaluate once he recheck blood work  Consider further options  Statins may not be a reasonable option given his advanced age  Will defer to geriatrics  Relevant Orders    Comprehensive metabolic panel    Lipid Panel with Direct LDL reflex    Hypertension     Blood pressure today slightly elevated  Recommend check labs, and then consider restarting lisinopril  He has not been on that in quite some time    He ran out, then did not know that he
Detail Level: Detailed
needed to have it, and was unsure what it was  Relevant Orders    CBC and differential    Comprehensive metabolic panel    TSH, 3rd generation    Hypothyroidism     Patient has been off Synthroid for quite some time now  Would recommend recheck, and then replace as necessary  This will certainly cause more problems for him  I do not think that explains everything, however  There certainly more problems than just hypothyroidism  Relevant Orders    TSH, 3rd generation    Memory loss or impairment     Patient has had small lacunar infarcts in the past, as well as 1 larger infarct based on the report from Neurology  At this point, 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  Would recommend follow-up with Geriatrics for their input with this, but at this time it would be geared at limiting risk factors for heart disease and CVA  This would include keeping his blood pressure under control, blood sugar, cholesterol, limiting external chemical irritants such as alcohol  At this point, I am also concerned about driving  He can certainly participate in a driving evaluation, but if he does not wish to do this I would recommend that he not drive at this point  Relevant Orders    Ambulatory referral to Geriatrics    Comprehensive metabolic panel    TSH, 3rd generation    RPR    Vitamin B12    Folate    White matter disease     Per discussion memory loss  Multiple small strokes  Risk factor modification  Relevant Orders    Ambulatory referral to Geriatrics    CBC and differential    Comprehensive metabolic panel    TSH, 3rd generation    RPR    Vitamin B12    Folate          COVID 19 Instructions    Tatiana Perez was advised to limit contact with others to essential tasks such as getting food, medications, and medical care  Proper handwashing reviewed, and Hand sanitzer when washing is not available      If the patient develops symptoms of COVID 19, the patient should call the
Pathology Override (Pathology Will Render As Diagnosis Name If Left Blank): Primary Nodular Basal Cell Carcinoma
office as soon as possible  For [id number] Flu season, it is strongly recommended that Flu Vaccinations be obtained  Please try to download [employer]  Once you do download this on your phone, you will be prompted to add your phone number to the account  After that, he should receive a text from [employer], and use that code to verify your phone number  After that, you should be able to use [employer] Duo to receive and make video calls  Please download [employer] Teams to your phone or computer  We will be transitioning to this platform for Video Visits  Instructions for downloading this are available from the office 
Field Number: 1
Lesion Dimensions-X Axis In Cm: 0.6
Lesion Margin Size In Cm: 0.4
Shield Size In Cm: 1.5cm x 1.5cm
Applicator Size In Cm: 2.0 cm
Energy (Kv): 100
Treatment Time (Min): 0.43
Time, Dose, Fractionation Factor (Tdf) For Prescription 1: 87
Daily Dose (Cgy): 273.05
Number Of Fractions For Prescription 1: 18
Treatments Per Week: 3
Total Dose For Prescription 1 (Cgy): 4914.90
Add A Second Prescription?: Yes
Treatment Time (Min): 0.44
Time, Dose, Fractionation Factor (Tdf) For Prescription 2: 8
Daily Dose (Cgy): 279.40
Number Of Fractions For Prescription 2: 2
Total Dose For Prescription 2 (Cgy): 558.80
Add A Third Prescription?: No
Additional Fraction(S) Needed:: 0
Bill For Physics Consultation: No - Render Text Only
Physics Documentation: (Physics Check Verbiage)

## 2023-11-29 ENCOUNTER — APPOINTMENT (OUTPATIENT)
Dept: URBAN - METROPOLITAN AREA CLINIC 255 | Age: 73
Setting detail: DERMATOLOGY
End: 2023-11-29

## 2023-11-29 PROBLEM — C44.311 BASAL CELL CARCINOMA OF SKIN OF NOSE: Status: ACTIVE | Noted: 2023-11-29

## 2023-11-29 PROCEDURE — 77401 RADIATION TX DELIVERY SUPFC: CPT

## 2023-11-29 PROCEDURE — OTHER IMAGE GUIDED - SUPERFICIAL RADIOTHERAPY: TREATMENT VISIT: OTHER

## 2023-11-29 PROCEDURE — 77280 THER RAD SIMULAJ FIELD SMPL: CPT

## 2023-11-29 PROCEDURE — OTHER IMAGE GUIDED - SUPERFICIAL RADIOTHERAPY: OTHER

## 2023-11-29 NOTE — PROCEDURE: IMAGE GUIDED - SUPERFICIAL RADIOTHERAPY: TREATMENT VISIT
Show Ultrasound In Note?: No
Treatment Documentation: This patient has been treated today with image-guided superficial radiation therapy for non-melanoma skin cancer. Written informed consent has been previously obtained from this patient for this treatment. This consent is documented in the patient’s chart. The patient gave verbal consent to continue treatment today. The patient was treated with a specific radiation dose and setup as prescribed by the provider listed on this visit note. A Radiation Therapist performed administration of radiation under the supervision of a provider. The treatment parameters and cumulative dose are indicated above. Prior to administering the radiation, the patient underwent a verification therapeutic radiology simulation-aided field setting defining relevant normal and abnormal target anatomy in addition to data necessary to develop an optimal radiation treatment process for the patient. The field placement simulation documents any change seen in overall tumor volume documented in the patient’s record, allows the clinician to indicate any needed changes in the treatment plan and/or prescription, provides diagnostic evaluation as the basis for performing the therapeutic procedure, and clearly identifies the information needed to decide to proceed with the therapeutic procedure. This process includes verification of the treatment port(s) and proper treatment positioning. All treatment ports were photographed within electronic medical records. The patient’s lead blocking along with gross tumor volume and margin was confirmed. Considering superficial radiotherapy is clinical in setup, this requires the physician and radiation therapist to clarify the location interest being treated against initial images, pathology, and patient anatomy. Care was taken to ensure bruner treated were geometrically accurate and properly positioned using therapeutic radiology simulation-aided field setting verification per fraction. This process is also utilized to determine if any prescription or setup changes are necessary. These steps are therefore medically necessary to ensure safe and effective administration of radiation. Ongoing therapeutic radiology simulation-aided field setting verification is ordered throughout the course of therapy. \\n\\nPer Dr. Waggoner continued ultrasound guidance will continue on OTV treatment days, and 2 follow up visits, which is required for, measurement of tumor depth, width, breadth, tumor progress, whether to proceed with therapeutic delivery, and acute effect monitoring.
Prescription Used: 1
Bill For Ultrasound Evaluation (): Yes
Additional Comments (Add Customization Of Note Here): Lefty did not have any questions or concerns prior to treatment today.
Fraction Number: 19
Energy (Kv): 100
Calculate Total Cumulative Dose Automatically Or Manually: Manually
Daily Dosage (Cgy): 273.05
Ultrasound Used Text: High frequency ultrasound depth is 1.72 mm, which is 0.10 mm in difference from previous imaging.
Total Cumulative Dose (Cgy): 5100.65
Ultrasound Not Used Text: Ultrasound was not performed today due to

## 2023-11-30 ENCOUNTER — APPOINTMENT (OUTPATIENT)
Dept: URBAN - METROPOLITAN AREA CLINIC 255 | Age: 73
Setting detail: DERMATOLOGY
End: 2023-11-30

## 2023-11-30 PROBLEM — C44.311 BASAL CELL CARCINOMA OF SKIN OF NOSE: Status: ACTIVE | Noted: 2023-11-30

## 2023-11-30 PROCEDURE — OTHER IMAGE GUIDED - SUPERFICIAL RADIOTHERAPY: TREATMENT VISIT: OTHER

## 2023-11-30 PROCEDURE — 77401 RADIATION TX DELIVERY SUPFC: CPT

## 2023-11-30 PROCEDURE — G6001 ECHO GUIDANCE RADIOTHERAPY: HCPCS | Mod: GA

## 2023-11-30 PROCEDURE — 77427 RADIATION TX MANAGEMENT X5: CPT

## 2023-11-30 PROCEDURE — 77280 THER RAD SIMULAJ FIELD SMPL: CPT

## 2023-11-30 PROCEDURE — OTHER IMAGE GUIDED - SUPERFICIAL RADIOTHERAPY: OTHER

## 2023-11-30 PROCEDURE — OTHER IMAGE GUIDED - SUPERFICIAL RADIOTHERAPY: EVALUATION VISIT: OTHER

## 2023-11-30 NOTE — PROCEDURE: IMAGE GUIDED - SUPERFICIAL RADIOTHERAPY: EVALUATION VISIT
Evaluation Plan: The patient is undergoing superficial radiation therapy for skin cancer and presents for weekly evaluation and management. Per protocol and as documented on the flow sheet, the patient was questioned as to subjective redness, pruritus, pain, drainage, fatigue, or any other symptoms. Objectively, the radiation area was evaluated with regards to erythema, atrophy, scale, crusting, erosion, ulceration, edema, purpura, tenderness, warmth, drainage, and any other findings. The plan was extensively reviewed including dose and dosing schedule. The simulation and clinical setup were also reviewed as were external and any internal shields and based on this review the appropriateness and sufficiency of treatment was determined. \\n\\nA high-frequency ultrasound image was acquired today for a two-dimensional evaluation of the tumor volume, depth, width, breadth, review of prior response to treatment, provide geometric accuracy of field placement, and determine whether to proceed with therapeutic delivery.  \\n \\nA total of 7 ultrasounds are being prescribed; 1 ultrasound on initial Simulation Day, 1 at each of the 4 OTVS, and 1 at each of the follow-up appointment to be determined near the end of the treatment course.
Show Ultrasound In Note?: No
Is This Visit For Evaluation During Treatment Or Follow Up Post Treatment?: evaluation
Bill For Evaluation (92880)?: Yes
Radiation Therapy Oncology Group (Rtog) Score: 2
Ultrasound Not Used Text: Ultrasound was not performed today due to
Ultrasound Used Text: Ultrasound depth is 1.82 mm.
Assessment: Appropriate reaction

## 2023-12-02 ENCOUNTER — APPOINTMENT (OUTPATIENT)
Dept: URBAN - METROPOLITAN AREA CLINIC 255 | Age: 73
Setting detail: DERMATOLOGY
End: 2023-12-26

## 2023-12-02 PROBLEM — C44.311 BASAL CELL CARCINOMA OF SKIN OF NOSE: Status: ACTIVE | Noted: 2023-12-02

## 2023-12-02 PROCEDURE — OTHER IMAGE GUIDED - SUPERFICIAL RADIOTHERAPY: OTHER

## 2023-12-02 PROCEDURE — 77336 RADIATION PHYSICS CONSULT: CPT

## 2023-12-09 ENCOUNTER — APPOINTMENT (OUTPATIENT)
Dept: URBAN - METROPOLITAN AREA CLINIC 255 | Age: 73
Setting detail: DERMATOLOGY
End: 2023-12-26

## 2023-12-09 PROBLEM — C44.311 BASAL CELL CARCINOMA OF SKIN OF NOSE: Status: ACTIVE | Noted: 2023-12-09

## 2023-12-09 PROCEDURE — 77336 RADIATION PHYSICS CONSULT: CPT

## 2023-12-09 PROCEDURE — OTHER IMAGE GUIDED - SUPERFICIAL RADIOTHERAPY: OTHER

## 2023-12-21 ENCOUNTER — APPOINTMENT (OUTPATIENT)
Dept: URBAN - METROPOLITAN AREA CLINIC 255 | Age: 73
Setting detail: DERMATOLOGY
End: 2023-12-26

## 2023-12-21 PROBLEM — C44.91 BASAL CELL CARCINOMA OF SKIN, UNSPECIFIED: Status: ACTIVE | Noted: 2023-12-21

## 2023-12-21 PROCEDURE — G6001 ECHO GUIDANCE RADIOTHERAPY: HCPCS | Mod: GA

## 2023-12-21 PROCEDURE — 99213 OFFICE O/P EST LOW 20 MIN: CPT | Mod: 25

## 2023-12-21 PROCEDURE — OTHER IMAGE GUIDED - SUPERFICIAL RADIOTHERAPY: TREATMENT VISIT: OTHER

## 2023-12-21 NOTE — PROCEDURE: IMAGE GUIDED - SUPERFICIAL RADIOTHERAPY: TREATMENT VISIT
Show Ultrasound In Note?: Yes
Treatment Documentation: This patient has been treated today with image-guided superficial radiation therapy for non-melanoma skin cancer. Written informed consent has been previously obtained from this patient for this treatment. This consent is documented in the patient’s chart. The patient gave verbal consent to continue treatment today. The patient was treated with a specific radiation dose and setup as prescribed by the provider listed on this visit note. A Radiation Therapist performed administration of radiation under the supervision of a provider. The treatment parameters and cumulative dose are indicated above. Prior to administering the radiation, the patient underwent a verification therapeutic radiology simulation-aided field setting defining relevant normal and abnormal target anatomy in addition to data necessary to develop an optimal radiation treatment process for the patient. The field placement simulation documents any change seen in overall tumor volume documented in the patient’s record, allows the clinician to indicate any needed changes in the treatment plan and/or prescription, provides diagnostic evaluation as the basis for performing the therapeutic procedure, and clearly identifies the information needed to decide to proceed with the therapeutic procedure. This process includes verification of the treatment port(s) and proper treatment positioning. All treatment ports were photographed within electronic medical records. The patient’s lead blocking along with gross tumor volume and margin was confirmed. Considering superficial radiotherapy is clinical in setup, this requires the physician and radiation therapist to clarify the location interest being treated against initial images, pathology, and patient anatomy. Care was taken to ensure bruner treated were geometrically accurate and properly positioned using therapeutic radiology simulation-aided field setting verification per fraction. This process is also utilized to determine if any prescription or setup changes are necessary. These steps are therefore medically necessary to ensure safe and effective administration of radiation. Ongoing therapeutic radiology simulation-aided field setting verification is ordered throughout the course of therapy. \\n\\nPer Dr. Waggoner continued ultrasound guidance will continue on OTV treatment days, and 2 follow up visits, which is required for, measurement of tumor depth, width, breadth, tumor progress, whether to proceed with therapeutic delivery, and acute effect monitoring.
Prescription Used: 1
Additional Change To Daily Dosage Administered Mid Treatment?: No
Additional Comments (Add Customization Of Note Here): Encouraged Lefty to continue to apply Vaseline intranasally and to the skin surface while the treatment site heals. He asked when he could expect his nose to be healed, i said as far as the redness foes, on average about 80% of patients tell me the redness is gone 2 weeks post treatment, however he may have some redness that hangs around longer. I encouraged him to call or email me with any questions or concerns he has in the mean time. Lefty appreciated everything throughout the past few months.
Fraction Number: 20
Energy (Kv): 100
Calculate Total Cumulative Dose Automatically Or Manually: Manually
Daily Dosage (Cgy): 273.05
Ultrasound Used Text: High frequency ultrasound depth is 1.48 mm, which is 0.24 mm in difference from previous imaging.
Total Cumulative Dose (Cgy): 5473.70
Ultrasound Not Used Text: Ultrasound was not performed today due to

## 2023-12-27 DIAGNOSIS — E78.2 MIXED HYPERLIPIDEMIA: ICD-10-CM

## 2023-12-28 RX ORDER — ATORVASTATIN CALCIUM 20 MG/1
10 TABLET, FILM COATED ORAL DAILY
COMMUNITY
Start: 2023-12-28

## 2023-12-28 NOTE — TELEPHONE ENCOUNTER
Lars Coleman is requesting a refill of:    Refused Prescriptions:                       Disp   Refills    atorvastatin (LIPITOR) 20 MG tablet [Pharm*                Sig: TAKE 0.5 TABLETS BY MOUTH DAILY  Refused By: SIVAN BARFIELD  Reason for Refusal: Patient needs appointment    Needs fasting OV for refills

## 2023-12-30 DIAGNOSIS — Z79.4 TYPE 2 DIABETES MELLITUS WITHOUT COMPLICATION, WITH LONG-TERM CURRENT USE OF INSULIN (H): ICD-10-CM

## 2023-12-30 DIAGNOSIS — E11.9 TYPE 2 DIABETES MELLITUS WITHOUT COMPLICATION, WITH LONG-TERM CURRENT USE OF INSULIN (H): ICD-10-CM

## 2024-01-01 NOTE — PROCEDURE: IMAGE GUIDED - SUPERFICIAL RADIOTHERAPY: TREATMENT VISIT
English
Show Ultrasound In Note?: Yes
Treatment Documentation: This patient has been treated today with image-guided superficial radiation therapy for non-melanoma skin cancer. Written informed consent has been previously obtained from this patient for this treatment. This consent is documented in the patient’s chart. The patient gave verbal consent to continue treatment today. The patient was treated with a specific radiation dose and setup as prescribed by the provider listed on this visit note. A Radiation Therapist performed administration of radiation under the supervision of a provider. The treatment parameters and cumulative dose are indicated above. Prior to administering the radiation, the patient underwent a verification therapeutic radiology simulation-aided field setting defining relevant normal and abnormal target anatomy in addition to data necessary to develop an optimal radiation treatment process for the patient. The field placement simulation documents any change seen in overall tumor volume documented in the patient’s record, allows the clinician to indicate any needed changes in the treatment plan and/or prescription, provides diagnostic evaluation as the basis for performing the therapeutic procedure, and clearly identifies the information needed to decide to proceed with the therapeutic procedure. This process includes verification of the treatment port(s) and proper treatment positioning. All treatment ports were photographed within electronic medical records. The patient’s lead blocking along with gross tumor volume and margin was confirmed. Considering superficial radiotherapy is clinical in setup, this requires the physician and radiation therapist to clarify the location interest being treated against initial images, pathology, and patient anatomy. Care was taken to ensure bruner treated were geometrically accurate and properly positioned using therapeutic radiology simulation-aided field setting verification per fraction. This process is also utilized to determine if any prescription or setup changes are necessary. These steps are therefore medically necessary to ensure safe and effective administration of radiation. Ongoing therapeutic radiology simulation-aided field setting verification is ordered throughout the course of therapy. \\n\\nPer Dr. Waggoner continued ultrasound guidance will continue on OTV treatment days, and 2 follow up visits, which is required for, measurement of tumor depth, width, breadth, tumor progress, whether to proceed with therapeutic delivery, and acute effect monitoring.
Prescription Used: 1
Additional Change To Daily Dosage Administered Mid Treatment?: No
Additional Comments (Add Customization Of Note Here): Encouraged Lefty to continue to apply Vaseline intranasally and to the skin surface while the treatment site heals. He asked when he could expect his nose to be healed, i said as far as the redness foes, on average about 80% of patients tell me the redness is gone 2 weeks post treatment, however he may have some redness that hangs around longer. I encouraged him to call or email me with any questions or concerns he has in the mean time. Lefty appreciated everything throughout the past few months.
Fraction Number: 20
Energy (Kv): 100
Calculate Total Cumulative Dose Automatically Or Manually: Manually
Daily Dosage (Cgy): 273.05
Ultrasound Used Text: High frequency ultrasound depth is 1.48 mm, which is 0.24 mm in difference from previous imaging.
Total Cumulative Dose (Cgy): 5473.70
Ultrasound Not Used Text: Ultrasound was not performed today due to

## 2024-01-02 RX ORDER — METFORMIN HCL 500 MG
2000 TABLET, EXTENDED RELEASE 24 HR ORAL
COMMUNITY
Start: 2024-01-02

## 2024-01-02 NOTE — TELEPHONE ENCOUNTER
Received incoming refill request for  Pending Prescriptions:                       Disp   Refills    metFORMIN (GLUCOPHAGE XR) 500 MG 24 hr ta*360 ta*1            Sig: TAKE 4 TABLETS BY MOUTH DAILY WITH DINNER    Patient is due for an OV. [employer] message already sent to patient.

## 2024-01-05 NOTE — PROCEDURE: IMAGE GUIDED - SUPERFICIAL RADIOTHERAPY
Body Location Override (Optional): Nasal Supratip
Detail Level: Detailed
Pathology Override (Pathology Will Render As Diagnosis Name If Left Blank): Primary Nodular Basal Cell Carcinoma
Field Number: 1
Lesion Dimensions-X Axis In Cm: 0.6
Lesion Margin Size In Cm: 0.4
Shield Size In Cm: 1.5cm x 1.5cm
Applicator Size In Cm: 2.0 cm
Energy (Kv): 100
Treatment Time (Min): 0.43
Time, Dose, Fractionation Factor (Tdf) For Prescription 1: 87
Daily Dose (Cgy): 273.05
Number Of Fractions For Prescription 1: 18
Treatments Per Week: 3
Total Dose For Prescription 1 (Cgy): 4914.90
Add A Second Prescription?: Yes
Treatment Time (Min): 0.44
Time, Dose, Fractionation Factor (Tdf) For Prescription 2: 8
80
Daily Dose (Cgy): 279.40
Number Of Fractions For Prescription 2: 2
Total Dose For Prescription 2 (Cgy): 558.80
Add A Third Prescription?: No
Additional Fraction(S) Needed:: 0
Physics Consultation Performed For Fractions:: 1-5
Physics Documentation: Per the request of Dr. Waggoner, continuing medical physics review as per radiotherapy standard of care post every 5th fraction for patient, including assessment of treatment parameters,  of dose delivery, and review of patient treatment documentation in support of the provider, to ensure efficacy and continued safe delivery of radiotherapy. Included in physics check is review of patient setup information, all pertinent simulation and treatment photographs checks, prescription, dose calculation verification, per fraction dose charted correctly, elapsed days and treatment days correctly charted, cumulative dose correct, and review of any prescription changes. Patient was not present, nor was it necessary for the patient to be present for weekly physics review and no other superficial radiotherapy services were rendered on this day. Continued medical physics review post every 5th fraction of therapy is requested by provider for appropriate radiotherapy management and is deemed medically necessary and standard of care.

## 2024-01-25 ENCOUNTER — OFFICE VISIT (OUTPATIENT)
Dept: FAMILY MEDICINE | Facility: CLINIC | Age: 74
End: 2024-01-25

## 2024-01-25 VITALS
SYSTOLIC BLOOD PRESSURE: 138 MMHG | HEIGHT: 71 IN | HEART RATE: 79 BPM | DIASTOLIC BLOOD PRESSURE: 70 MMHG | TEMPERATURE: 97.6 F | BODY MASS INDEX: 28.56 KG/M2 | WEIGHT: 204 LBS | OXYGEN SATURATION: 97 %

## 2024-01-25 DIAGNOSIS — Z79.4 TYPE 2 DIABETES MELLITUS WITHOUT COMPLICATION, WITH LONG-TERM CURRENT USE OF INSULIN (H): Primary | ICD-10-CM

## 2024-01-25 DIAGNOSIS — C44.310 BCC (BASAL CELL CARCINOMA), FACE: ICD-10-CM

## 2024-01-25 DIAGNOSIS — E78.2 MIXED HYPERLIPIDEMIA: ICD-10-CM

## 2024-01-25 DIAGNOSIS — E11.9 TYPE 2 DIABETES MELLITUS WITHOUT COMPLICATION, WITH LONG-TERM CURRENT USE OF INSULIN (H): Primary | ICD-10-CM

## 2024-01-25 LAB
ALBUMIN (URINE) MG/L: 30
ALBUMIN SERPL-MCNC: 4 G/DL (ref 3.6–5.1)
ALBUMIN URINE MG/G CR: <30 MG/G CREATININE
ALBUMIN/GLOB SERPL: 1.3 {RATIO} (ref 1–2.5)
ALP SERPL-CCNC: 82 U/L (ref 33–130)
ALT 1742-6: 18 U/L (ref 0–32)
AST 1920-8: 17 U/L (ref 0–35)
BILIRUB SERPL-MCNC: 0.8 MG/DL (ref 0.2–1.2)
BUN SERPL-MCNC: 13 MG/DL (ref 7–25)
BUN/CREATININE RATIO: 14.9 (ref 6–32)
CALCIUM SERPL-MCNC: 9.3 MG/DL (ref 8.6–10.3)
CHLORIDE SERPLBLD-SCNC: 101.5 MMOL/L (ref 98–110)
CHOLEST SERPL-MCNC: 184 MG/DL (ref 0–199)
CHOLEST/HDLC SERPL: 5 {RATIO} (ref 0–5)
CO2 SERPL-SCNC: 27.8 MMOL/L (ref 20–32)
CREAT SERPL-MCNC: 0.87 MG/DL (ref 0.6–1.3)
CREATININE URINE MG/DL: 200 MG/DL
GLOBULIN, CALCULATED - QUEST: 3 (ref 1.9–3.7)
GLUCOSE SERPL-MCNC: 196 MG/DL (ref 60–99)
HBA1C MFR BLD: 9.2 % (ref 4–7)
HDLC SERPL-MCNC: 39 MG/DL (ref 40–150)
LDLC SERPL CALC-MCNC: 103 MG/DL (ref 0–130)
POTASSIUM SERPL-SCNC: 4.36 MMOL/L (ref 3.5–5.3)
PROT SERPL-MCNC: 7 G/DL (ref 6.1–8.1)
SODIUM SERPL-SCNC: 138.1 MMOL/L (ref 135–146)
TRIGL SERPL-MCNC: 211 MG/DL (ref 0–149)

## 2024-01-25 PROCEDURE — G2211 COMPLEX E/M VISIT ADD ON: HCPCS | Performed by: FAMILY MEDICINE

## 2024-01-25 PROCEDURE — 82570 ASSAY OF URINE CREATININE: CPT | Performed by: FAMILY MEDICINE

## 2024-01-25 PROCEDURE — 80061 LIPID PANEL: CPT | Performed by: FAMILY MEDICINE

## 2024-01-25 PROCEDURE — 80053 COMPREHEN METABOLIC PANEL: CPT | Performed by: FAMILY MEDICINE

## 2024-01-25 PROCEDURE — 83036 HEMOGLOBIN GLYCOSYLATED A1C: CPT | Performed by: FAMILY MEDICINE

## 2024-01-25 PROCEDURE — 36415 COLL VENOUS BLD VENIPUNCTURE: CPT | Performed by: FAMILY MEDICINE

## 2024-01-25 PROCEDURE — 82043 UR ALBUMIN QUANTITATIVE: CPT | Performed by: FAMILY MEDICINE

## 2024-01-25 PROCEDURE — 99214 OFFICE O/P EST MOD 30 MIN: CPT | Performed by: FAMILY MEDICINE

## 2024-01-25 RX ORDER — METFORMIN HCL 500 MG
2000 TABLET, EXTENDED RELEASE 24 HR ORAL
Qty: 360 TABLET | Refills: 1 | Status: SHIPPED | OUTPATIENT
Start: 2024-01-25 | End: 2024-08-20

## 2024-01-25 RX ORDER — ATORVASTATIN CALCIUM 20 MG/1
10 TABLET, FILM COATED ORAL DAILY
Qty: 45 TABLET | Refills: 1 | Status: SHIPPED | OUTPATIENT
Start: 2024-01-25 | End: 2024-08-20

## 2024-01-25 RX ORDER — INSULIN GLARGINE 100 [IU]/ML
43 INJECTION, SOLUTION SUBCUTANEOUS AT BEDTIME
Qty: 45 ML | Refills: 1 | Status: SHIPPED | OUTPATIENT
Start: 2024-01-25 | End: 2024-08-20

## 2024-01-25 RX ORDER — FLURBIPROFEN SODIUM 0.3 MG/ML
SOLUTION/ DROPS OPHTHALMIC DAILY
Qty: 100 EACH | Refills: 1 | Status: SHIPPED | OUTPATIENT
Start: 2024-01-25 | End: 2024-09-30

## 2024-01-25 RX ORDER — SEMAGLUTIDE 0.68 MG/ML
0.5 INJECTION, SOLUTION SUBCUTANEOUS
Qty: 3 ML | Refills: 1 | Status: SHIPPED | OUTPATIENT
Start: 2024-01-25 | End: 2024-03-26

## 2024-01-25 NOTE — NURSING NOTE
Chief Complaint   Patient presents with    Recheck Medication     Fasting med refill         Pre-visit Screening:  Immunizations:  up to date  Colonoscopy:  is up to date  Mammogram: NA  Asthma Action Test/Plan:  NA  PHQ9:  NA  GAD7:  NA  Questioned patient about current smoking habits Pt. has never smoked.  Ok to leave detailed message on voice mail for today's visit only Yes, phone # [phone]

## 2024-01-25 NOTE — PROGRESS NOTES
"  Assessment & Plan     Type 2 diabetes mellitus without complication, with long-term current use of insulin (H)  Poor control, pt noncompliant with ozempic after running out-stressed importance of diabetes control on long term health, will restart medications but Inwill also have MTM chec in with pt  - HEMOGLOBIN A1C (BFP)  - VENOUS COLLECTION  - ALBUMIN RANDOM URINE QUANTITATIVE (BFP)  - blood glucose (NO BRAND SPECIFIED) lancets standard  Dispense: 100 each; Refill: 1  - blood glucose (NO BRAND SPECIFIED) test strip  Dispense: 100 strip; Refill: 1  - insulin glargine (LANTUS SOLOSTAR) 100 UNIT/ML pen  Dispense: 45 mL; Refill: 1  - insulin pen needle (ULTICARE MINI) 31G X 6 MM miscellaneous  Dispense: 100 each; Refill: 1  - metFORMIN (GLUCOPHAGE XR) 500 MG 24 hr tablet  Dispense: 360 tablet; Refill: 1  - semaglutide (OZEMPIC, 0.25 OR 0.5 MG/DOSE,) 2 MG/3ML pen  Dispense: 3 mL; Refill: 1  - Comprehensive Metobolic Panel (BFP)    Mixed hyperlipidemia  Control uncertain, continue current medications at current doses pending labs  - atorvastatin (LIPITOR) 20 MG tablet  Dispense: 45 tablet; Refill: 1  - Lipid Panel (BFP)  - Comprehensive Metobolic Panel (BFP)    BCC (basal cell carcinoma), face  Doing well with radiation treatments      Review of external notes as documented elsewhere in note  Review of the result(s) of each unique test - labs  Ordering of each unique test  Prescription drug management        BMI  Estimated body mass index is 28.45 kg/m  as calculated from the following:    Height as of this encounter: 1.803 m (5' 11\").    Weight as of this encounter: 92.5 kg (204 lb).   Weight management plan: Discussed healthy diet and exercise guidelines    FUTURE APPOINTMENTS:       - Follow-up visit in 3 mo given poor diabetes control  Work on weight loss  Regular exercise    No follow-ups on file.    Sean Sousa is a 73 year old, presenting for the following health issues:  Recheck Medication (Fasting med " refill)    [facility]       Diabetes Follow-up  Lantus 43 unit(s), metformin, ozempic- pt states he ran out of ozempic 9/23 and has not taken since    How often are you checking your blood sugar? One time daily  What time of day are you checking your blood sugars (select all that apply)?  Before meals  Have you had any blood sugars above 200?  Yes   Have you had any blood sugars below 70?  No  What symptoms do you notice when your blood sugar is low?  Shaky  What concerns do you have today about your diabetes? Blood sugar is often over 200   Do you have any of these symptoms? (Select all that apply)  No numbness or tingling in feet.  No redness, sores or blisters on feet.  No complaints of excessive thirst.  No reports of blurry vision.  No significant changes to weight.      BP Readings from Last 2 Encounters:   01/25/24 138/70   04/19/23 128/64     Hemoglobin A1C (%)   Date Value   04/19/2023 8.1 (A)   12/07/2022 9.8 (A)     LDL Cholesterol Calculated (mg/dL (calc))   Date Value   02/07/2019 99   02/27/2018 76     LDL Cholesterol Direct (mg/dL)   Date Value   04/19/2023 70   12/07/2022 119             Hyperlipidemia Follow-Up    Are you regularly taking any medication or supplement to lower your cholesterol?   Yes- atorvastatin  Are you having muscle aches or other side effects that you think could be caused by your cholesterol lowering medication?  No  How many servings of fruits and vegetables do you eat daily?  2-3  On average, how many sweetened beverages do you drink each day (Examples: soda, juice, sweet tea, etc.  Do NOT count diet or artificially sweetened beverages)?   1  How many days per week do you exercise enough to make your heart beat faster? 4  How many minutes a day do you exercise enough to make your heart beat faster? 30 - 60  How many days per week do you miss taking your medication? 0    Pt had treatment for BCC on face- chose radiation      Review of Systems  Constitutional, HEENT, cardiovascular,  "pulmonary, gi and gu systems are negative, except as otherwise noted.      Objective    /70 (BP Location: Right arm, Patient Position: Sitting, Cuff Size: Adult Large)   Pulse 79   Temp 97.6  F (36.4  C) (Temporal)   Ht 1.803 m (5' 11\")   Wt 92.5 kg (204 lb)   SpO2 97%   BMI 28.45 kg/m    Body mass index is 28.45 kg/m .  Physical Exam   GENERAL: alert and no distress  NECK: no adenopathy, no asymmetry, masses, or scars  RESP: lungs clear to auscultation - no rales, rhonchi or wheezes  CV: regular rate and rhythm, normal S1 S2, no S3 or S4, no murmur, click or rub, no peripheral edema  ABDOMEN: soft, nontender, no hepatosplenomegaly, no masses and bowel sounds normal  MS: no gross musculoskeletal defects noted, no edema  PSYCH: mentation appears normal, affect normal/bright    Results for orders placed or performed in visit on 01/25/24 (from the past 24 hour(s))   HEMOGLOBIN A1C (BFP)   Result Value Ref Range    Hemoglobin A1C 9.2 (A) 4.0 - 7.0 %           Signed Electronically by: Pop Pena MD      "

## 2024-03-26 DIAGNOSIS — E11.9 TYPE 2 DIABETES MELLITUS WITHOUT COMPLICATION, WITH LONG-TERM CURRENT USE OF INSULIN (H): ICD-10-CM

## 2024-03-26 DIAGNOSIS — Z79.4 TYPE 2 DIABETES MELLITUS WITHOUT COMPLICATION, WITH LONG-TERM CURRENT USE OF INSULIN (H): ICD-10-CM

## 2024-03-26 RX ORDER — SEMAGLUTIDE 0.68 MG/ML
0.5 INJECTION, SOLUTION SUBCUTANEOUS
Qty: 3 ML | Refills: 1 | Status: SHIPPED | OUTPATIENT
Start: 2024-03-26 | End: 2024-06-12

## 2024-03-26 NOTE — Clinical Note
Please see note. Patient doing well with restarting Ozempic. Due for refill which I sent in. Will make follow up appt for April.

## 2024-03-26 NOTE — TELEPHONE ENCOUNTER
Patient has started back on Ozempic and all is going well. He states that his fasting BG has been 130 or below and post prandial after dinner tends to be about 180, notes sometimes will be a bit higher, with maximum about 220.    Patient is due for appointment next month and will make follow up appointment. In need of a refill prior to this appointment. Will send in refill at this time.    Moon Toney, PharmD  Clinical Pharmacist  [facility]  General Clinic Phone: [phone]  Direct Office Phone: [phone]

## 2024-04-26 ENCOUNTER — TRANSFERRED RECORDS (OUTPATIENT)
Dept: FAMILY MEDICINE | Facility: CLINIC | Age: 74
End: 2024-04-26

## 2024-05-16 ENCOUNTER — APPOINTMENT (OUTPATIENT)
Dept: URBAN - METROPOLITAN AREA CLINIC 255 | Age: 74
Setting detail: DERMATOLOGY
End: 2024-05-20

## 2024-05-16 PROBLEM — C44.311 BASAL CELL CARCINOMA OF SKIN OF NOSE: Status: ACTIVE | Noted: 2024-05-16

## 2024-05-16 PROCEDURE — G6001 ECHO GUIDANCE RADIOTHERAPY: HCPCS | Mod: GA

## 2024-05-16 PROCEDURE — OTHER IMAGE GUIDED - SUPERFICIAL RADIOTHERAPY: OTHER

## 2024-05-16 PROCEDURE — 99213 OFFICE O/P EST LOW 20 MIN: CPT | Mod: 25

## 2024-05-16 NOTE — PROCEDURE: IMAGE GUIDED - SUPERFICIAL RADIOTHERAPY
Body Location Override (Optional): Nasal Supratip
Hospitalist Discharge Summary     Patient ID:  Richard Soto  [id number]  46 y.o.  1986 4/4/2022    PCP on record: Lyla Moser MD    Admit date: 4/4/2022  Discharge date and time: 4/6/2022    DISCHARGE DIAGNOSIS:  Ulcerative colitis flareup  Hypokalemia    CONSULTATIONS:  IP CONSULT TO GASTROENTEROLOGY    Excerpted HPI from H&P of Kim Dent MD:  Richard Soto is a 30 y.o. female who presents with ongoing bloody stools, nausea and vomiting. Pt was recently discharged from [facility] on Wed 03/30. She had been having multiple months of ongoing diarrhea, with intermittent bloody stools. Colonoscopy by Dr. Estrella Greenwood revealed changes consistent with ulcerative colitis during her hospitalization at [facility]. She was started on mesalamine PO and suppository. Since discharge she has been having ongoing diarrhea, over 10 per day, with intermittent bloody stools. Her rectum is painful and raw with each bowel movement. In addition she has been having intermittent nasusea. For the last 24 hours she has been having vomiting, with dry heaving. She was worried about dehydration, and also wanted to discuss options for faster treatment options, and so she presented to her nearest ER which was [facility] short pump. ROS positive for mouth ulcers, and muscle aches. No fevers, abdominal pain only occurs prior to having a diarrhea BM. No rashes.      In the Er she was found to be hypokalemia, K 2.3, given 10meq of K and admitted.              ______________________________________________________________________  DISCHARGE SUMMARY/HOSPITAL COURSE:  for full details see H&P, daily progress notes, labs, consult notes.    Ulcerative colitis flare - recent diagnosis  -f/u  Dr. Charly Couch at [facility]    - prednisone  40mg daily and taper as OP with GI   -Continue mesalamine PO and rectal   - Bentyl as needed  - diet as tolerated  -Appreciate GI input s/o case        Hypokalemia resolved, continue KCl 20 twice daily and follow-up
Detail Level: Detailed
levels at PCPs office in 1 week       GERD - PPI       _______________________________________________________________________  Patient seen and examined by me on discharge day. Patient maximized inpatient benefits stay, diarrhea has improved. Signed off by GI. She is eager to go home and will follow up with her primary gastroenterologist at [facility]. Patient agreed and verbalized understanding of discharge plan and instructions at this time. _______________________________________________________________________  DISCHARGE MEDICATIONS:   Current Discharge Medication List      START taking these medications    Details   predniSONE (DELTASONE) 10 mg tablet Take 4 tablets daily and taper as per gastroenterology recommendations after 1 week  Qty: 120 Tablet, Refills: 0  Start date: 4/6/2022      potassium chloride (K-DUR, KLOR-CON M20) 20 mEq tablet Take 1 Tablet by mouth two (2) times a day. Qty: 15 Tablet, Refills: 0  Start date: 4/6/2022         CONTINUE these medications which have NOT CHANGED    Details   mesalamine (CANASA) 1,000 mg suppository INSERT 1 SUPPOSITORY RECTALLY AT BEDTIME      mesalamine (LIALDA) 1.2 gram delayed release tablet Take 2,400 mg by mouth two (2) times a day. pantoprazole (PROTONIX) 40 mg tablet TAKE 1 TABLET BY MOUTH 30 TO 60 MIN BEFORE BREAKFAST      !! ondansetron (ZOFRAN ODT) 4 mg disintegrating tablet Take 1 Tablet by mouth every six (6) hours as needed for Nausea, Vomiting or Nausea or Vomiting. Qty: 12 Tablet, Refills: 0      !! ondansetron (ZOFRAN ODT) 4 mg disintegrating tablet Take 1 Tablet by mouth every eight (8) hours as needed for Nausea or Vomiting. Qty: 10 Tablet, Refills: 0      dicyclomine (BENTYL) 10 mg/5 mL soln oral solution Take 10 mL by mouth four (4) times daily. Qty: 473 mL, Refills: 0      Low-Ogestrel, 28, 0.3-30 mg-mcg tab Take 1 Tablet by mouth daily. !! - Potential duplicate medications found. Please discuss with provider.             Patient
Follow Up Instructions:    Diet as before  Activity as before  Check CBC/BMP at PCPs office in 1 week  Take prednisone 40 mg daily and taper after 1 week with guidance from primary GI office  Return to ER or call PCP immediately if symptoms recur or get worse    Follow-up Information     Follow up With Specialties Details Why [address] Carroll MD Family Medicine In 1 week Check CBC/BMP/ [facility] [address]  [address] [place] [address]  [phone]      Gastroenterology Dr. Maria Fernanda Yeh at [facility] in 1 week, taper prednisone as per his recommendation            ________________________________________________________________    Risk of deterioration: Low    Condition at Discharge:  Stable  __________________________________________________________________    Disposition  Home with family, no needs    ____________________________________________________________________    Code Status: Full Code  ___________________________________________________________________      Total time in minutes spent coordinating this discharge  32  minutes    Signed:  Sami Meryle Hurst, MD .
Pathology Override (Pathology Will Render As Diagnosis Name If Left Blank): Primary Nodular Basal Cell Carcinoma
Field Number: 1
Lesion Dimensions-X Axis In Cm: 0.6
Lesion Margin Size In Cm: 0.4
Shield Size In Cm: 1.5cm x 1.5cm
Applicator Size In Cm: 2.0 cm
Energy (Kv): 100
Treatment Time (Min): 0.43
Time, Dose, Fractionation Factor (Tdf) For Prescription 1: 87
Daily Dose (Cgy): 273.05
Number Of Fractions For Prescription 1: 18
Treatments Per Week: 3
Total Dose For Prescription 1 (Cgy): 4914.90
Add A Second Prescription?: Yes
Treatment Time (Min): 0.44
Time, Dose, Fractionation Factor (Tdf) For Prescription 2: 8
Daily Dose (Cgy): 279.40
Number Of Fractions For Prescription 2: 2
Total Dose For Prescription 2 (Cgy): 558.80
Add A Third Prescription?: No
Additional Fraction(S) Needed:: 0
Physics Consultation Performed For Fractions:: 1-5
Physics Documentation: Per the request of Dr. Waggoner, continuing medical physics review as per radiotherapy standard of care post every 5th fraction for patient, including assessment of treatment parameters,  of dose delivery, and review of patient treatment documentation in support of the provider, to ensure efficacy and continued safe delivery of radiotherapy. Included in physics check is review of patient setup information, all pertinent simulation and treatment photographs checks, prescription, dose calculation verification, per fraction dose charted correctly, elapsed days and treatment days correctly charted, cumulative dose correct, and review of any prescription changes. Patient was not present, nor was it necessary for the patient to be present for weekly physics review and no other superficial radiotherapy services were rendered on this day. Continued medical physics review post every 5th fraction of therapy is requested by provider for appropriate radiotherapy management and is deemed medically necessary and standard of care.

## 2024-05-21 ENCOUNTER — TRANSFERRED RECORDS (OUTPATIENT)
Dept: FAMILY MEDICINE | Facility: CLINIC | Age: 74
End: 2024-05-21

## 2024-06-01 ENCOUNTER — HEALTH MAINTENANCE LETTER (OUTPATIENT)
Age: 74
End: 2024-06-01

## 2024-06-11 DIAGNOSIS — E11.9 TYPE 2 DIABETES MELLITUS WITHOUT COMPLICATION, WITH LONG-TERM CURRENT USE OF INSULIN (H): ICD-10-CM

## 2024-06-11 DIAGNOSIS — Z79.4 TYPE 2 DIABETES MELLITUS WITHOUT COMPLICATION, WITH LONG-TERM CURRENT USE OF INSULIN (H): ICD-10-CM

## 2024-06-11 NOTE — TELEPHONE ENCOUNTER
Lars Coleman is requesting a refill of:    Pending Prescriptions:                       Disp   Refills    OZEMPIC (0.25 OR 0.5 MG/DOSE) 2 MG/3ML pe*3 mL   0            Sig: INJECT 0.5 MG SUBCUTANEOUS EVERY 7 DAYS    Needs OV for refills

## 2024-06-12 RX ORDER — SEMAGLUTIDE 0.68 MG/ML
0.5 INJECTION, SOLUTION SUBCUTANEOUS
Qty: 3 ML | Refills: 0 | Status: SHIPPED | OUTPATIENT
Start: 2024-06-12 | End: 2024-07-21

## 2024-06-13 NOTE — TELEPHONE ENCOUNTER
Lab Results   Component Value Date    HGBA1C 7.5 (H) 06/12/2024       Recent Labs     06/12/24  1538 06/12/24  2047 06/13/24  0732 06/13/24  1052   POCGLU 176* 196* 154* 177*       H/o diabetes  HbA1c 7.5  Patient does not appear to be on any outpatient medications  Blood Sugar Average: Last 72 hrs:  (P) 158  Outpatient follow-up with PCP  Lifestyle changes   Pt has appt scheduled 11/21/22. Needs extension of his medications to get him to this appointment.    Lars Coleman is requesting a refill of:    Pending Prescriptions:                       Disp   Refills    insulin pen needle (ULTICARE MINI) 31G X *100 ea*1            Sig: daily    insulin glargine (LANTUS SOLOSTAR) 100 UN*15 mL  0            Sig: Inject 43 Units Subcutaneous At Bedtime    atorvastatin (LIPITOR) 20 MG tablet       15 tab*0            Sig: Take 0.5 tablets (10 mg) by mouth daily    metFORMIN (GLUCOPHAGE XR) 500 MG 24 hr ta*120 ta*0            Sig: Take 4 tablets (2,000 mg) by mouth daily (with           dinner)

## 2024-07-01 ENCOUNTER — MYC MEDICAL ADVICE (OUTPATIENT)
Dept: FAMILY MEDICINE | Facility: CLINIC | Age: 74
End: 2024-07-01

## 2024-07-19 DIAGNOSIS — E11.9 TYPE 2 DIABETES MELLITUS WITHOUT COMPLICATION, WITH LONG-TERM CURRENT USE OF INSULIN (H): ICD-10-CM

## 2024-07-19 DIAGNOSIS — Z79.4 TYPE 2 DIABETES MELLITUS WITHOUT COMPLICATION, WITH LONG-TERM CURRENT USE OF INSULIN (H): ICD-10-CM

## 2024-07-19 NOTE — TELEPHONE ENCOUNTER
Lars Coleman is requesting a refill of:    Pending Prescriptions:                       Disp   Refills    semaglutide (OZEMPIC (0.25 OR 0.5 MG/DOSE*3 mL   0            Sig: INJECT 0.5 MG SUBCUTANEOUS EVERY 7 DAYS    Needs OV for refills

## 2024-07-21 RX ORDER — SEMAGLUTIDE 0.68 MG/ML
0.5 INJECTION, SOLUTION SUBCUTANEOUS
Qty: 3 ML | Refills: 0 | Status: SHIPPED | OUTPATIENT
Start: 2024-07-21 | End: 2024-08-16

## 2024-07-26 ENCOUNTER — TRANSFERRED RECORDS (OUTPATIENT)
Dept: FAMILY MEDICINE | Facility: CLINIC | Age: 74
End: 2024-07-26

## 2024-07-28 DIAGNOSIS — Z79.4 TYPE 2 DIABETES MELLITUS WITHOUT COMPLICATION, WITH LONG-TERM CURRENT USE OF INSULIN (H): ICD-10-CM

## 2024-07-28 DIAGNOSIS — E11.9 TYPE 2 DIABETES MELLITUS WITHOUT COMPLICATION, WITH LONG-TERM CURRENT USE OF INSULIN (H): ICD-10-CM

## 2024-07-29 NOTE — TELEPHONE ENCOUNTER
Lars Coleman is requesting a refill of:    Pending Prescriptions:                       Disp   Refills    blood glucose (CONTOUR NEXT TEST) test st*100 st*3            Sig: USE TO TEST BLOOD SUGAR ONE TIMES DAILY OR AS           DIRECTED.    Please close encounter if RX was sent. Thanks, Clara

## 2024-08-02 DIAGNOSIS — E78.2 MIXED HYPERLIPIDEMIA: ICD-10-CM

## 2024-08-02 RX ORDER — ATORVASTATIN CALCIUM 20 MG/1
10 TABLET, FILM COATED ORAL DAILY
COMMUNITY
Start: 2024-08-02

## 2024-08-10 ENCOUNTER — HEALTH MAINTENANCE LETTER (OUTPATIENT)
Age: 74
End: 2024-08-10

## 2024-08-16 DIAGNOSIS — Z79.4 TYPE 2 DIABETES MELLITUS WITHOUT COMPLICATION, WITH LONG-TERM CURRENT USE OF INSULIN (H): ICD-10-CM

## 2024-08-16 DIAGNOSIS — E11.9 TYPE 2 DIABETES MELLITUS WITHOUT COMPLICATION, WITH LONG-TERM CURRENT USE OF INSULIN (H): ICD-10-CM

## 2024-08-16 RX ORDER — SEMAGLUTIDE 0.68 MG/ML
0.5 INJECTION, SOLUTION SUBCUTANEOUS
Qty: 3 ML | Refills: 0 | Status: SHIPPED | OUTPATIENT
Start: 2024-08-16 | End: 2024-08-20

## 2024-08-16 NOTE — TELEPHONE ENCOUNTER
Lars Coleman is requesting a refill of:    Pending Prescriptions:                       Disp   Refills    semaglutide (OZEMPIC (0.25 OR 0.5 MG/DOSE*3 mL   0            Sig: INJECT 0.5 MG SUBCUTANEOUS EVERY 7 DAYS    Pt has OV for refills

## 2024-08-19 DIAGNOSIS — E78.2 MIXED HYPERLIPIDEMIA: ICD-10-CM

## 2024-08-20 ENCOUNTER — OFFICE VISIT (OUTPATIENT)
Dept: FAMILY MEDICINE | Facility: CLINIC | Age: 74
End: 2024-08-20

## 2024-08-20 VITALS
DIASTOLIC BLOOD PRESSURE: 60 MMHG | SYSTOLIC BLOOD PRESSURE: 142 MMHG | WEIGHT: 201 LBS | BODY MASS INDEX: 28.14 KG/M2 | RESPIRATION RATE: 20 BRPM | HEART RATE: 80 BPM | TEMPERATURE: 97.7 F | HEIGHT: 71 IN

## 2024-08-20 DIAGNOSIS — E78.2 MIXED HYPERLIPIDEMIA: ICD-10-CM

## 2024-08-20 DIAGNOSIS — E11.9 TYPE 2 DIABETES MELLITUS WITHOUT COMPLICATION, WITH LONG-TERM CURRENT USE OF INSULIN (H): Primary | ICD-10-CM

## 2024-08-20 DIAGNOSIS — Z13.89 SCREENING FOR DIABETIC PERIPHERAL NEUROPATHY: ICD-10-CM

## 2024-08-20 DIAGNOSIS — Z00.00 ENCOUNTER FOR ANNUAL WELLNESS EXAM IN MEDICARE PATIENT: ICD-10-CM

## 2024-08-20 DIAGNOSIS — C44.310 BCC (BASAL CELL CARCINOMA), FACE: ICD-10-CM

## 2024-08-20 DIAGNOSIS — Z79.4 TYPE 2 DIABETES MELLITUS WITHOUT COMPLICATION, WITH LONG-TERM CURRENT USE OF INSULIN (H): Primary | ICD-10-CM

## 2024-08-20 LAB
ALBUMIN SERPL-MCNC: 4.1 G/DL (ref 3.6–5.1)
ALP SERPL-CCNC: 58 U/L (ref 33–130)
ALT 1742-6: 30 U/L (ref 0–32)
AST 1920-8: 25 U/L (ref 0–35)
BILIRUB SERPL-MCNC: 1.1 MG/DL (ref 0.2–1.2)
BUN SERPL-MCNC: 21 MG/DL (ref 7–25)
BUN/CREATININE RATIO: 22 (ref 6–32)
CALCIUM SERPL-MCNC: 9.9 MG/DL (ref 8.6–10.3)
CHLORIDE SERPLBLD-SCNC: 98.2 MMOL/L (ref 98–110)
CHOLEST SERPL-MCNC: 187 MG/DL (ref 0–199)
CHOLEST/HDLC SERPL: 5 {RATIO} (ref 0–5)
CO2 SERPL-SCNC: 26 MMOL/L (ref 20–32)
CREAT SERPL-MCNC: 0.95 MG/DL (ref 0.6–1.3)
GLUCOSE SERPL-MCNC: 138 MG/DL (ref 60–99)
HDLC SERPL-MCNC: 37 MG/DL (ref 40–150)
HEMOGLOBIN A1C: 8.2 % (ref 4–5.6)
LDLC SERPL CALC-MCNC: 96 MG/DL
POTASSIUM SERPL-SCNC: 4.06 MMOL/L (ref 3.5–5.3)
PROT SERPL-MCNC: 6.9 G/DL (ref 6.1–8.1)
SODIUM SERPL-SCNC: 135.4 MMOL/L (ref 135–146)
TRIGL SERPL-MCNC: 268 MG/DL (ref 0–149)

## 2024-08-20 PROCEDURE — 80061 LIPID PANEL: CPT | Performed by: FAMILY MEDICINE

## 2024-08-20 PROCEDURE — G0439 PPPS, SUBSEQ VISIT: HCPCS | Performed by: FAMILY MEDICINE

## 2024-08-20 PROCEDURE — 99214 OFFICE O/P EST MOD 30 MIN: CPT | Mod: 25 | Performed by: FAMILY MEDICINE

## 2024-08-20 PROCEDURE — 83036 HEMOGLOBIN GLYCOSYLATED A1C: CPT | Performed by: FAMILY MEDICINE

## 2024-08-20 PROCEDURE — 36415 COLL VENOUS BLD VENIPUNCTURE: CPT | Performed by: FAMILY MEDICINE

## 2024-08-20 PROCEDURE — 80053 COMPREHEN METABOLIC PANEL: CPT | Performed by: FAMILY MEDICINE

## 2024-08-20 RX ORDER — SEMAGLUTIDE 0.68 MG/ML
0.5 INJECTION, SOLUTION SUBCUTANEOUS
Qty: 3 ML | Refills: 0 | Status: SHIPPED | OUTPATIENT
Start: 2024-08-20 | End: 2024-09-25

## 2024-08-20 RX ORDER — ATORVASTATIN CALCIUM 20 MG/1
10 TABLET, FILM COATED ORAL DAILY
Qty: 45 TABLET | Refills: 1 | Status: SHIPPED | OUTPATIENT
Start: 2024-08-20

## 2024-08-20 RX ORDER — ATORVASTATIN CALCIUM 20 MG/1
10 TABLET, FILM COATED ORAL DAILY
COMMUNITY
Start: 2024-08-20

## 2024-08-20 RX ORDER — INSULIN GLARGINE 100 [IU]/ML
43 INJECTION, SOLUTION SUBCUTANEOUS AT BEDTIME
Qty: 45 ML | Refills: 1 | Status: SHIPPED | OUTPATIENT
Start: 2024-08-20

## 2024-08-20 RX ORDER — METFORMIN HCL 500 MG
2000 TABLET, EXTENDED RELEASE 24 HR ORAL
Qty: 360 TABLET | Refills: 1 | Status: SHIPPED | OUTPATIENT
Start: 2024-08-20

## 2024-08-20 NOTE — NURSING NOTE
Lars Coleman is here for a diabetic check, medication refill and Wellness    Questioned patient about current smoking habits.  Pt. has never smoked.  Body mass index is 28.03 kg/m .  PULSE regular  My Chart: active  CLASSIFICATION OF OVERWEIGHT AND OBESITY BY BMI                        Obesity Class           BMI(kg/m2)  Underweight                                    < 18.5  Normal                                         18.5-24.9  Overweight                                     25.0-29.9  OBESITY                     I                  30.0-34.9                             II                 35.0-39.9  EXTREME OBESITY             III                >40                            Patient's  BMI Body mass index is 28.03 kg/m .  http://hin.nhlbi.nih.gov/menuplanner/menu.cgi    Pre-visit planning  Immunizations - up to date  Colonoscopy - is up to date  Mammogram -   Asthma -   PHQ9 -    ANYA-7 -      The patient has verbalized that it is ok to leave a detailed voice message on the patient's cell phone with results/recommendations from this visit.

## 2024-08-20 NOTE — TELEPHONE ENCOUNTER
Lars Coleman is requesting a refill of:    Refused Prescriptions:                       Disp   Refills    atorvastatin (LIPITOR) 20 MG tablet [Pharm*                Sig: TAKE 0.5 TABLETS BY MOUTH DAILY  Refused By: MARVIN BRUCE  Reason for Refusal: Patient needs appointment    Will refill at pt's appt today

## 2024-08-20 NOTE — PROGRESS NOTES
Lars Coleman is a 74 year old male who presents for Medicare Annual Wellness Visit.    Current providers caring for this patient include:  Patient Care Team:  Pop Pena MD as PCP - General (Family Practice)  Pop Pena MD as Assigned PCP    Complete Medical and Social history reviewed with patient, outlined below.    Patient Active Problem List   Diagnosis    Mixed hyperlipidemia    Family history of malignant neoplasm of gastrointestinal tract    Calculus of kidney    Special screening for malignant neoplasm of prostate    Type 2 diabetes mellitus without complication, with long-term current use of insulin (H)    Tinnitus, bilateral    Tremor    Urinary urgency    Persistent insomnia    ACP (advance care planning)       Past Medical History:   Diagnosis Date    Kidney stone     Mixed hyperlipidemia     Pancreatic lesion 11/2020    Noted in ED, needs follow up.    Squamous cell carcinoma of skin of face     Type 2 diabetes mellitus        Past Surgical History:   Procedure Laterality Date    APPENDECTOMY      ESOPHAGOSCOPY, GASTROSCOPY, DUODENOSCOPY (EGD), COMBINED N/A 1/11/2021    Procedure: ESOPHAGOGASTRODUODENOSCOPY, WITH FINE NEEDLE ASPIRATION BIOPSY, WITH ENDOSCOPIC ULTRASOUND GUIDANCE;  Surgeon: Clara Tracy MD;  Location: [facility] OR    EXCISE MASS NECK N/A 7/19/2022    Procedure: Excisional biopsy posterior neck mass;  Surgeon: Jacey Eubanks MD;  Location: [facility] OR    VASECTOMY         Family History   Problem Relation Age of Onset    Cancer - colorectal Father         rectal cancer    Cerebrovascular Disease Father         in his 40s (smoker)    Diabetes Brother     C.A.D. No family hx of     Prostate Cancer No family hx of        Social History     Tobacco Use    Smoking status: Never     Passive exposure: Never    Smokeless tobacco: Never   Substance Use Topics    Alcohol use: Yes     Alcohol/week: 0.0 standard drinks of alcohol     Comment: rare       Diet:  "regular, low salt/low fat  Physical Activity: active without specific exercise program  Depression Screen:    Over the past 2 weeks, patient has felt down, depressed, or hopeless:  No    Over the past 2 weeks, patient has felt little interest or pleasure in doing things: No    Functional ability/Safety screen:  Up and go test (able to get up and walk longer than 30 seconds): Passed  Patient needs assistance with: nothing  Patient's home has the following possible safety concerns: none identified  Patient has concerns about his hearing:  No  Cognitive Screen  Patient repeats three objects (ball, flag, tree)      Clock drawing test:   NORMAL  Recalls three objects after 3 minutes (ball,flag,tree):                                                                                               recalls 3 objects (3 points)    Physical Exam:  BP (!) 142/60 (BP Location: Right arm, Patient Position: Chair, Cuff Size: Adult Regular)   Resp 20   Ht 1.803 m (5' 11\")   Wt 91.2 kg (201 lb)   BMI 28.03 kg/m     Body mass index is 28.03 kg/m .              End of Life Planning:   Patient currently has an advanced directive: Yes.  Practitioner is supportive of decision.    Education/Counseling:   Based on review of the above information, the following items were addressed:      Elevated blood pressure - follow-up plans made      Diabetes -  access to diabetes self-management training, medical nutrition therapy, and treatment    Appropriate preventive services were discussed with this patient, including applicable screening as appropriate for cardiovascular disease, diabetes, osteopenia/osteoporosis, and glaucoma.  As appropriate for age/gender, discussed screening for colorectal cancer, prostate cancer, breast cancer, and cervical cancer.   Checklist reviewing preventive services available has been given to the patient.              Assessment & Plan     Type 2 diabetes mellitus without complication, with long-term current use " "of insulin (H)  Improved control but not at goal, no lows-will increase insulin to 45 unit(s), continue meds-watch for lows  - HEMOGLOBIN A1C (BFP)  - VENOUS COLLECTION    Mixed hyperlipidemia  Control uncertain, continue current medications at current doses pending labs    BCC (basal cell carcinoma), face  Seeing derm    Screening for diabetic peripheral neuropathy      Encounter for annual wellness exam in Medicare patient  discussed preventitive healthcare Continue to work on healthy diet and exercise, discussed healthy habits   Personalized Prevention Plan  You are due for the preventive services outlined below.  Your care team is available to assist you in scheduling these services.  If you have already completed any of these items, please share that information with your care team to update in your medical record.  Health Maintenance   Topic Date Due    COVID-19 Vaccine (7 - 2023-24 season) 02/25/2024    MEDICARE ANNUAL WELLNESS VISIT  04/19/2024    DIABETIC FOOT EXAM  04/19/2024    RSV VACCINE (Pregnancy & 60+) (1 - 1-dose 60+ series) 01/25/2025 (Originally 8/14/2011)    ZOSTER IMMUNIZATION (1 of 2) 01/25/2026 (Originally 8/14/2001)    INFLUENZA VACCINE (1) 09/01/2024    BMP  01/25/2025    LIPID  01/25/2025    MICROALBUMIN  01/25/2025    FALL RISK ASSESSMENT  01/25/2025    A1C  02/20/2025    EYE EXAM  04/26/2025    COLORECTAL CANCER SCREENING  10/28/2025    ADVANCE CARE PLANNING  03/30/2026    DTAP/TDAP/TD IMMUNIZATION (3 - Td or Tdap) 10/25/2033    HEPATITIS C SCREENING  Completed    PHQ-2 (once per calendar year)  Completed    Pneumococcal Vaccine: 65+ Years  Completed    IPV IMMUNIZATION  Aged Out    HPV IMMUNIZATION  Aged Out    MENINGITIS IMMUNIZATION  Aged Out    RSV MONOCLONAL ANTIBODY  Aged Out                BMI  Estimated body mass index is 28.03 kg/m  as calculated from the following:    Height as of this encounter: 1.803 m (5' 11\").    Weight as of this encounter: 91.2 kg (201 lb).         FUTURE " APPOINTMENTS:       - Follow-up visit in 6 mo  Regular exercise    No follow-ups on file.    Sean Sousa is a 74 year old, presenting for the following health issues:  Recheck Medication and Wellness Visit    HPI       Diabetes Follow-up  43 unit(s) insulin    How often are you checking your blood sugar? A few times a week  What time of day are you checking your blood sugars (select all that apply)?  Before meals and bedtime  Have you had any blood sugars above 200?  Yes some  Have you had any blood sugars below 70?  No  What symptoms do you notice when your blood sugar is low?  Shaky  What concerns do you have today about your diabetes? None   Do you have any of these symptoms? (Select all that apply)  No numbness or tingling in feet.  No redness, sores or blisters on feet.  No complaints of excessive thirst.  No reports of blurry vision.  No significant changes to weight.    Pt has had a few episodes of slight near fainting spells, did not feel vertigo, did not check blood sugars- happened on toilet and once while driving    BP Readings from Last 2 Encounters:   08/20/24 (!) 142/60   01/25/24 138/70     Hemoglobin A1C (%)   Date Value   01/25/2024 9.2 (A)   04/19/2023 8.1 (A)     LDL Cholesterol Calculated (mg/dL (calc))   Date Value   02/07/2019 99   02/27/2018 76     LDL Cholesterol Direct (mg/dL)   Date Value   01/25/2024 103   04/19/2023 70             Hyperlipidemia Follow-Up    Are you regularly taking any medication or supplement to lower your cholesterol?   Yes- atorvastatin   Are you having muscle aches or other side effects that you think could be caused by your cholesterol lowering medication?  No  How many servings of fruits and vegetables do you eat daily?  2-3  On average, how many sweetened beverages do you drink each day (Examples: soda, juice, sweet tea, etc.  Do NOT count diet or artificially sweetened beverages)?   1  How many days per week do you exercise enough to make your heart beat  "faster? 3 or less  How many minutes a day do you exercise enough to make your heart beat faster? 10 - 19  How many days per week do you miss taking your medication? 0          Review of Systems  Constitutional, HEENT, cardiovascular, pulmonary, gi and gu systems are negative, except as otherwise noted.      Objective    BP (!) 142/60 (BP Location: Right arm, Patient Position: Chair, Cuff Size: Adult Regular)   Pulse 80   Temp 97.7  F (36.5  C) (Temporal)   Resp 20   Ht 1.803 m (5' 11\")   Wt 91.2 kg (201 lb)   BMI 28.03 kg/m    Body mass index is 28.03 kg/m .  Physical Exam   GENERAL: alert and no distress  EYES: Eyes grossly normal to inspection, PERRL and conjunctivae and sclerae normal  HENT: ear canals and TM's normal, nose and mouth without ulcers or lesions  NECK: no adenopathy, no asymmetry, masses, or scars  RESP: lungs clear to auscultation - no rales, rhonchi or wheezes  CV: regular rate and rhythm, normal S1 S2, no S3 or S4, no murmur, click or rub, no peripheral edema  ABDOMEN: soft, nontender, no hepatosplenomegaly, no masses and bowel sounds normal  MS: no gross musculoskeletal defects noted, no edema  PSYCH: mentation appears normal, affect normal/bright  Diabetic foot exam: normal DP and PT pulses, no trophic changes or ulcerative lesions, and normal sensory exam    Results for orders placed or performed in visit on 08/20/24 (from the past 24 hour(s))   HEMOGLOBIN A1C (BFP)   Result Value Ref Range    Hemoglobin A1C 8.2 (A) 4 - 5.6 %           Signed Electronically by: Pop Pena MD         " 0

## 2024-09-24 DIAGNOSIS — Z79.4 TYPE 2 DIABETES MELLITUS WITHOUT COMPLICATION, WITH LONG-TERM CURRENT USE OF INSULIN (H): ICD-10-CM

## 2024-09-24 DIAGNOSIS — E11.9 TYPE 2 DIABETES MELLITUS WITHOUT COMPLICATION, WITH LONG-TERM CURRENT USE OF INSULIN (H): ICD-10-CM

## 2024-09-24 NOTE — TELEPHONE ENCOUNTER
Lars Coleman is requesting a refill of:    Pending Prescriptions:                       Disp   Refills    semaglutide (OZEMPIC (0.25 OR 0.5 MG/DOSE*9 mL   0            Sig: INJECT 0.5 MG SUBCUTANEOUSLY EVERY 7 DAYS    Please close encounter if RX was sent. Thanks, Clara

## 2024-09-25 RX ORDER — SEMAGLUTIDE 0.68 MG/ML
0.5 INJECTION, SOLUTION SUBCUTANEOUS
Qty: 9 ML | Refills: 0 | Status: SHIPPED | OUTPATIENT
Start: 2024-09-25

## 2024-09-26 DIAGNOSIS — Z79.4 TYPE 2 DIABETES MELLITUS WITHOUT COMPLICATION, WITH LONG-TERM CURRENT USE OF INSULIN (H): ICD-10-CM

## 2024-09-26 DIAGNOSIS — E11.9 TYPE 2 DIABETES MELLITUS WITHOUT COMPLICATION, WITH LONG-TERM CURRENT USE OF INSULIN (H): ICD-10-CM

## 2024-09-27 DIAGNOSIS — E11.9 TYPE 2 DIABETES MELLITUS WITHOUT COMPLICATION, WITH LONG-TERM CURRENT USE OF INSULIN (H): ICD-10-CM

## 2024-09-27 DIAGNOSIS — Z79.4 TYPE 2 DIABETES MELLITUS WITHOUT COMPLICATION, WITH LONG-TERM CURRENT USE OF INSULIN (H): ICD-10-CM

## 2024-09-27 NOTE — TELEPHONE ENCOUNTER
Pt last seen 08/20/24.    Lars Coleman is requesting a refill of:    Pending Prescriptions:                       Disp   Refills    BD PEN NEEDLE MICRO U/F 32G X 6 MM miscel*100 ea*1            Sig: DAILY

## 2024-09-30 RX ORDER — PEN NEEDLE, DIABETIC 32 GX 1/4"
NEEDLE, DISPOSABLE MISCELLANEOUS DAILY
Qty: 100 EACH | Refills: 1 | Status: SHIPPED | OUTPATIENT
Start: 2024-09-30

## 2024-09-30 RX ORDER — SEMAGLUTIDE 0.68 MG/ML
0.5 INJECTION, SOLUTION SUBCUTANEOUS
COMMUNITY
Start: 2024-09-30

## 2024-09-30 NOTE — TELEPHONE ENCOUNTER
Lars Coleman is requesting a refill of:    Refused Prescriptions:                       Disp   Refills    semaglutide (OZEMPIC, 0.25 OR 0.5 MG/DOSE,*                Sig: INJECT 0.5 MG SUBCUTANEOUSLY EVERY 7 DAYS  Refused By: SIVAN BARFIELD  Reason for Refusal: Duplicate

## 2024-11-13 NOTE — PROCEDURE: IMAGE GUIDED - SUPERFICIAL RADIOTHERAPY: TREATMENT VISIT
Open access; need notes; mattie sent.  
PRE-OP DATA and Check List - GI:  Procedure: Colonoscopy (13026)   Diagnosis: colon cancer screening   Tentative Date: Routine (next available or patient preference)  Tentative Time: To be determined  Duration of Procedure: 30 min  Anesthesia: MAC  ASA Status:   Pre-Op Needed: no  Mytonomy Education Assignment: Colonoscopy    Do not take any NSAIDS such as ibuprofen, advil, motrin meloxicam, naproxen, aspirin and aleve for 3 days prior to procedure.   You may continue to take tylenol during this time.    Do not take any vitamins/herbal supplements for 7 days prior to your procedure. This includes multivitamins, vitamin D, and iron supplements.       
Patient is scheduled for a/an Colonoscopy (33730) with NuLytely, under MAC, with Rosenda Nina MD on 2/7/2025 at [facility].      Prep instructions were mailed to patient.    Nurse:   Please send the procedure prep of Golytely and the anti-nausea medication to the patient's pharmacy and contact patient for any pre-procedure concerns or questions. Pre-admission orders also need to be placed with any additional needed pre-op testing per anesthesia guidelines.       [id number] - [place], [place] - [address] AT [facility] ([address] & [facility]  [phone]     
Show Ultrasound In Note?: No
Treatment Documentation: This patient has been treated today with image-guided superficial radiation therapy for non-melanoma skin cancer. Written informed consent has been previously obtained from this patient for this treatment. This consent is documented in the patient’s chart. The patient gave verbal consent to continue treatment today. The patient was treated with a specific radiation dose and setup as prescribed by the provider listed on this visit note. A Radiation Therapist performed administration of radiation under the supervision of a provider. The treatment parameters and cumulative dose are indicated above. Prior to administering the radiation, the patient underwent a verification therapeutic radiology simulation-aided field setting defining relevant normal and abnormal target anatomy in addition to data necessary to develop an optimal radiation treatment process for the patient. The field placement simulation documents any change seen in overall tumor volume documented in the patient’s record, allows the clinician to indicate any needed changes in the treatment plan and/or prescription, provides diagnostic evaluation as the basis for performing the therapeutic procedure, and clearly identifies the information needed to decide to proceed with the therapeutic procedure. This process includes verification of the treatment port(s) and proper treatment positioning. All treatment ports were photographed within electronic medical records. The patient’s lead blocking along with gross tumor volume and margin was confirmed. Considering superficial radiotherapy is clinical in setup, this requires the physician and radiation therapist to clarify the location interest being treated against initial images, pathology, and patient anatomy. Care was taken to ensure bruner treated were geometrically accurate and properly positioned using therapeutic radiology simulation-aided field setting verification per fraction. This process is also utilized to determine if any prescription or setup changes are necessary. These steps are therefore medically necessary to ensure safe and effective administration of radiation. Ongoing therapeutic radiology simulation-aided field setting verification is ordered throughout the course of therapy. \\n\\nPer Dr. Waggoner continued ultrasound guidance will continue on OTV treatment days, and 2 follow up visits, which is required for, measurement of tumor depth, width, breadth, tumor progress, whether to proceed with therapeutic delivery, and acute effect monitoring.
Prescription Used: 1
Bill For Ultrasound ()?: Yes
Energy (Kv): 100
Calculate Total Cumulative Dose Automatically Or Manually: Manually
Daily Dosage (Cgy): 273.05
Ultrasound Used Text: High frequency ultrasound depth is mm, which is mm in difference from previous imaging.
Ultrasound Not Used Text: Ultrasound was not performed today due to

## 2024-11-14 ENCOUNTER — OFFICE VISIT (OUTPATIENT)
Dept: FAMILY MEDICINE | Facility: CLINIC | Age: 74
End: 2024-11-14

## 2024-11-14 DIAGNOSIS — E11.9 TYPE 2 DIABETES MELLITUS WITHOUT COMPLICATION, WITH LONG-TERM CURRENT USE OF INSULIN (H): Primary | ICD-10-CM

## 2024-11-14 DIAGNOSIS — Z79.4 TYPE 2 DIABETES MELLITUS WITHOUT COMPLICATION, WITH LONG-TERM CURRENT USE OF INSULIN (H): Primary | ICD-10-CM

## 2024-11-14 LAB — HEMOGLOBIN A1C: 9 % (ref 4–5.6)

## 2024-11-14 PROCEDURE — 36415 COLL VENOUS BLD VENIPUNCTURE: CPT | Performed by: FAMILY MEDICINE

## 2024-11-14 PROCEDURE — 83036 HEMOGLOBIN GLYCOSYLATED A1C: CPT | Performed by: FAMILY MEDICINE

## 2024-11-14 RX ORDER — TIRZEPATIDE 2.5 MG/.5ML
2.5 INJECTION, SOLUTION SUBCUTANEOUS
COMMUNITY
Start: 2024-11-14

## 2024-11-14 NOTE — PROGRESS NOTES
SUBJECTIVE/OBJECTIVE:                Lars Coleman is a 74 year old male seen for a follow-up visit for Medication Management Services.  He was referred to me from Dr. Pop Pena.     Chief Complaint: Follow up from [facility] visit on 12/08/2022.      Diabetes:  Current Medications:  Current Outpatient Medications   Medication Sig Dispense Refill    Acetylcarnitine HCl (ACETYL-L-CARNITINE HCL) POWD daily      Ascorbic Acid (VITAMIN C PO) Take by mouth daily      atorvastatin (LIPITOR) 20 MG tablet Take 0.5 tablets (10 mg) by mouth daily 45 tablet 1    BD PEN NEEDLE MICRO U/F 32G X 6 MM miscellaneous DAILY 100 each 1    blood glucose (CONTOUR NEXT TEST) test strip Use to test blood sugar once times daily or as directed. 100 strip 3    blood glucose (NO BRAND SPECIFIED) lancets standard Use to test blood sugar one times daily or as directed. 100 each 1    blood glucose monitoring (NO BRAND SPECIFIED) meter device kit Use to test blood sugar one times daily or as directed. 1 kit 0    Cholecalciferol (VITAMIN D PO) Take by mouth daily      coenzyme Q10 (CO-Q10) 30 MG capsule Take 1 capsule (30 mg) by mouth daily      Green Tea, Camillia sinensis, (GREEN TEA EXTRACT PO) Take by mouth daily      insulin glargine (LANTUS SOLOSTAR) 100 UNIT/ML pen Inject 43 Units subcutaneously at bedtime 45 mL 1    KRILL OIL PO Take 1 capsule by mouth daily      LITHIUM PO Take 1,000 mg by mouth daily      MAGNESIUM PO Take by mouth daily      metFORMIN (GLUCOPHAGE XR) 500 MG 24 hr tablet Take 4 tablets (2,000 mg) by mouth daily (with dinner) 360 tablet 1    MILK THISTLE PO Take 1 tablet by mouth daily      Nutritional Supplements (NUTRITIONAL SUPPLEMENT PO) Cornitex, Cinsulin, TriSugar Shield, Nurish Green Energy, Amazing Grass Green Superfood      OZEMPIC, 0.25 OR 0.5 MG/DOSE, 2 MG/3ML pen INJECT 0.5 MG SUBCUTANEOUSLY EVERY 7 DAYS 9 mL 0    TAURINE PO Take 1 tablet by mouth daily      TURMERIC PO Take 1 tablet by mouth daily        No current facility-administered medications for this visit.       We discussed the benefits and risks of each medication.  Patient Questions/Concerns:  Balance issues. Improving with discontinuation of Ozempic.  Labs:  Lab Results   Component Value Date    A1C 9.0 11/14/2024    A1C 8.2 08/20/2024    A1C 9.2 01/25/2024    A1C 8.1 04/19/2023    A1C 9.8 12/07/2022    A1C 10.1 07/06/2022    A1C 8.9 05/03/2022       ASSESSMENT/PLAN:                Diabetes:  Assessment: Patient control had been improving with insulin adjustment, however patient was having problems with balance, which he was contributing to Ozempic, so he independently discontinued Ozempic between 2-3 months ago.    He notes that the balance issues have occurred over the last approximately 2 years, with one episode that he fell to the left to his knees. He states he has not fallen since, however episodes of balance problem is becoming more frequent. Patient does note he feels gait has improved since discontinuing Ozempic.    Patient has had tremor since his 20s, but notes this is tolerable. Also states he has had more trouble finding some words recently.    He has tried Mounjaro in the past with good effect, however cost was a concern. With new medicare updates for 2025, he feels this may be a feasible option.    Will provide Lars a month of samples from the clinic. He will start again today and follow up over the phone prior to completion of the four week sample pack.    Status: Diabetes control has worsened with discontinuation of Ozempic.  Drug Therapy Problems:  1) Need intensification of therapy.  Plan:  1) Start Mounjaro 2.5mg weekly.   2) Patient to follow up over phone in 4 weeks   3) Patient encouraged to reach out if any balance issues noticed during this time.      I spent 45 minutes with this patient today.  All changes were made via collaborative practice agreement with Pop Pena. A copy of the visit note was provided to  the patient's primary care provider.    Moon Toney, PharmD  Clinical Pharmacist  [facility] Phone: [phone]  Direct Office Phone: [phone]

## 2024-12-21 DIAGNOSIS — E11.9 TYPE 2 DIABETES MELLITUS WITHOUT COMPLICATIONS (H): ICD-10-CM

## 2024-12-23 RX ORDER — LANCETS
EACH MISCELLANEOUS
Qty: 100 EACH | Refills: 1 | Status: SHIPPED | OUTPATIENT
Start: 2024-12-23

## 2024-12-23 NOTE — TELEPHONE ENCOUNTER
Pt needing new lancets, last seen 08/20/24.    Lars Coleman is requesting a refill of:    Pending Prescriptions:                       Disp   Refills    Microlet Lancets MISC [Pharmacy Med Name:*100 ea*1            Sig: USE TO TEST BLOOD SUGAR ONE TIMES DAILY OR AS           DIRECTED.

## 2025-01-16 ENCOUNTER — OFFICE VISIT (OUTPATIENT)
Dept: FAMILY MEDICINE | Facility: CLINIC | Age: 75
End: 2025-01-16

## 2025-01-16 DIAGNOSIS — E11.9 TYPE 2 DIABETES MELLITUS WITHOUT COMPLICATIONS (H): Primary | ICD-10-CM

## 2025-01-16 NOTE — Clinical Note
Please see note. Patient has tolerated Mounjaro well. Will increase at this time. Patient will come in for repeat A1c next month.

## 2025-01-16 NOTE — PROGRESS NOTES
SUBJECTIVE/OBJECTIVE:                Lars Coleman is a 74 year old male seen for a follow-up visit for Medication Management Services.  He was referred to me from Dr. Pop Pena.     Chief Complaint: Follow up from [facility] visit on 11/14/2024.      Diabetes:  Current Medications:  Current Outpatient Medications   Medication Sig Dispense Refill    Tirzepatide 5 MG/0.5ML SOAJ Inject 0.5 mLs (5 mg) subcutaneously every 7 days. 2 mL 1    Acetylcarnitine HCl (ACETYL-L-CARNITINE HCL) POWD daily      Ascorbic Acid (VITAMIN C PO) Take by mouth daily      atorvastatin (LIPITOR) 20 MG tablet Take 0.5 tablets (10 mg) by mouth daily 45 tablet 1    BD PEN NEEDLE MICRO U/F 32G X 6 MM miscellaneous DAILY 100 each 1    blood glucose (CONTOUR NEXT TEST) test strip Use to test blood sugar once times daily or as directed. 100 strip 3    blood glucose (NO BRAND SPECIFIED) lancets standard Use to test blood sugar one times daily or as directed. 100 each 1    blood glucose monitoring (NO BRAND SPECIFIED) meter device kit Use to test blood sugar one times daily or as directed. 1 kit 0    Cholecalciferol (VITAMIN D PO) Take by mouth daily      coenzyme Q10 (CO-Q10) 30 MG capsule Take 1 capsule (30 mg) by mouth daily      Green Tea, Camillia sinensis, (GREEN TEA EXTRACT PO) Take by mouth daily      insulin glargine (LANTUS SOLOSTAR) 100 UNIT/ML pen Inject 43 Units subcutaneously at bedtime 45 mL 1    KRILL OIL PO Take 1 capsule by mouth daily      LITHIUM PO Take 1,000 mg by mouth daily      MAGNESIUM PO Take by mouth daily      metFORMIN (GLUCOPHAGE XR) 500 MG 24 hr tablet Take 4 tablets (2,000 mg) by mouth daily (with dinner) 360 tablet 1    Microlet Lancets MISC USE TO TEST BLOOD SUGAR ONE TIMES DAILY OR AS DIRECTED. 100 each 1    MILK THISTLE PO Take 1 tablet by mouth daily      Nutritional Supplements (NUTRITIONAL SUPPLEMENT PO) Cornitex, Cinsulin, TriSugar Shield, Nurish Green Energy, Amazing Grass Green Superfood       TAURINE PO Take 1 tablet by mouth daily      TURMERIC PO Take 1 tablet by mouth daily       No current facility-administered medications for this visit.       We discussed the benefits and risks of each medication.  Patient Questions/Concerns:  Continuation of Mounjaro.   Labs:  Lab Results   Component Value Date    A1C 9.0 11/14/2024    A1C 8.2 08/20/2024    A1C 9.2 01/25/2024    A1C 8.1 04/19/2023    A1C 9.8 12/07/2022    A1C 10.1 07/06/2022    A1C 8.9 05/03/2022       ASSESSMENT/PLAN:                Diabetes:  Assessment: Patient discontinued Ozempic due to balance issues and restarted Mounjaro 2.5mg weekly about 4 weeks ago. Notes that his balance has improved, but also notes that he started Dopamine Advantage which he thinks has been helping this as well.    Patient notes no side effects with restart of Mounjaro, and notes appetite has decreased slightly, but felt the holidays made this less noticeable.    Patient currently is taking approximately 46 units of Lantus at bedtime. He states that he self adjusts based on his evening numbers.    Average fasting BG has been 120-130, with evening numbers about 200.     Will increase Mounjaro to 5mg at this time, and ask patient to cut lantus back to 44 units when making this change.     Patient is concerned with hypoglycemia as he had a brother who passed away from hypoglycemia. He will continue to test at home and reach out if he is seeing any low readings.    Cost is a bit of a concern. Discussed deductible and copay structure with patient, and he will reach out to insurance to assure what his responsibility will be.    Status: Diabetes control appears to be improving  Drug Therapy Problems:  1) Mounjaro dose increase  2) Lantus decrease  Plan:  1) Increase Mounjaro to 5mg weekly.  2) Decrease lantus to 44 units daily.  3) Continue to monitor blood sugars at home and reach out if any lows occur.  4) Follow up with Dr. Pena for repeat A1c in one month.    I spent 30  minutes with this patient today.  All changes were made via collaborative practice agreement with Pop Pena. A copy of the visit note was provided to the patient's primary care provider.    Moon Toney PharmD  Clinical Pharmacist  [facility] Phone: [phone]  Direct Office Phone: [phone]

## 2025-02-25 DIAGNOSIS — Z79.4 TYPE 2 DIABETES MELLITUS WITHOUT COMPLICATION, WITH LONG-TERM CURRENT USE OF INSULIN (H): ICD-10-CM

## 2025-02-25 DIAGNOSIS — E78.2 MIXED HYPERLIPIDEMIA: ICD-10-CM

## 2025-02-25 DIAGNOSIS — E11.9 TYPE 2 DIABETES MELLITUS WITHOUT COMPLICATION, WITH LONG-TERM CURRENT USE OF INSULIN (H): ICD-10-CM

## 2025-02-25 DIAGNOSIS — E11.9 TYPE 2 DIABETES MELLITUS WITHOUT COMPLICATIONS (H): ICD-10-CM

## 2025-02-25 RX ORDER — METFORMIN HYDROCHLORIDE 500 MG/1
2000 TABLET, EXTENDED RELEASE ORAL
COMMUNITY
Start: 2025-02-25

## 2025-02-25 RX ORDER — ATORVASTATIN CALCIUM 20 MG/1
10 TABLET, FILM COATED ORAL DAILY
COMMUNITY
Start: 2025-02-25

## 2025-02-25 NOTE — TELEPHONE ENCOUNTER
Patient called is scheduled for 3/15/25 but needs extension of   Pending Prescriptions:                       Disp   Refills    insulin glargine (LANTUS SOLOSTAR) 100 UN*45 mL  0            Sig: Inject 43 Units subcutaneously at bedtime.    atorvastatin (LIPITOR) 20 MG tablet       10 tab*0            Sig: Take 0.5 tablets (10 mg) by mouth daily.  Refused Prescriptions:                       Disp   Refills    metFORMIN (GLUCOPHAGE XR) 500 MG 24 hr tab*                Sig: TAKE 4 TABLETS BY MOUTH DAILY WITH DINNER  Refused By: RAINE MUHAMMAD  Reason for Refusal: Patient needs appointment    atorvastatin (LIPITOR) 20 MG tablet [Pharm*                Sig: TAKE 0.5 TABLETS BY MOUTH DAILY  Refused By: RAINE MUHAMMAD  Reason for Refusal: Patient needs appointment    Routing to Dr. Pena for approval.

## 2025-02-25 NOTE — TELEPHONE ENCOUNTER
Received incoming refill request for  Pending Prescriptions:                       Disp   Refills    metFORMIN (GLUCOPHAGE XR) 500 MG 24 hr ta*360 ta*1            Sig: TAKE 4 TABLETS BY MOUTH DAILY WITH DINNER    atorvastatin (LIPITOR) 20 MG tablet [Phar*45 tab*1            Sig: TAKE 0.5 TABLETS BY MOUTH DAILY    Patient is due for medication check with Dr. Pena-has recently seen Moon.   [employer] message sent .

## 2025-02-26 RX ORDER — INSULIN GLARGINE 100 [IU]/ML
43 INJECTION, SOLUTION SUBCUTANEOUS AT BEDTIME
Qty: 45 ML | Refills: 0 | Status: SHIPPED | OUTPATIENT
Start: 2025-02-26

## 2025-02-26 RX ORDER — ATORVASTATIN CALCIUM 20 MG/1
10 TABLET, FILM COATED ORAL DAILY
Qty: 10 TABLET | Refills: 0 | Status: SHIPPED | OUTPATIENT
Start: 2025-02-26

## 2025-03-13 ENCOUNTER — OFFICE VISIT (OUTPATIENT)
Dept: FAMILY MEDICINE | Facility: CLINIC | Age: 75
End: 2025-03-13

## 2025-03-13 VITALS
RESPIRATION RATE: 20 BRPM | WEIGHT: 201 LBS | SYSTOLIC BLOOD PRESSURE: 138 MMHG | DIASTOLIC BLOOD PRESSURE: 62 MMHG | BODY MASS INDEX: 28.14 KG/M2 | HEART RATE: 80 BPM | TEMPERATURE: 97.9 F | HEIGHT: 71 IN

## 2025-03-13 DIAGNOSIS — E78.2 MIXED HYPERLIPIDEMIA: ICD-10-CM

## 2025-03-13 DIAGNOSIS — Z12.5 SPECIAL SCREENING FOR MALIGNANT NEOPLASM OF PROSTATE: ICD-10-CM

## 2025-03-13 DIAGNOSIS — E11.9 TYPE 2 DIABETES MELLITUS WITHOUT COMPLICATION, WITH LONG-TERM CURRENT USE OF INSULIN (H): Primary | ICD-10-CM

## 2025-03-13 DIAGNOSIS — Z79.4 TYPE 2 DIABETES MELLITUS WITHOUT COMPLICATION, WITH LONG-TERM CURRENT USE OF INSULIN (H): Primary | ICD-10-CM

## 2025-03-13 PROBLEM — R39.15 URINARY URGENCY: Status: RESOLVED | Noted: 2020-12-08 | Resolved: 2025-03-13

## 2025-03-13 LAB
ALBUMIN (URINE) MG/L: 30
ALBUMIN SERPL-MCNC: 4.4 G/DL (ref 3.6–5.1)
ALBUMIN URINE MG/G CR: <30 MG/G CREATININE
ALP SERPL-CCNC: 61 U/L (ref 33–130)
ALT 1742-6: 13 U/L (ref 0–32)
AST 1920-8: 11 U/L (ref 0–35)
BILIRUB SERPL-MCNC: 0.7 MG/DL (ref 0.2–1.2)
BUN SERPL-MCNC: 16 MG/DL (ref 7–25)
BUN/CREATININE RATIO: 18 (ref 6–32)
CALCIUM SERPL-MCNC: 9.3 MG/DL (ref 8.6–10.3)
CHLORIDE SERPLBLD-SCNC: 104.2 MMOL/L (ref 98–110)
CHOLEST SERPL-MCNC: 95 MG/DL (ref 0–199)
CHOLEST/HDLC SERPL: 3 {RATIO} (ref 0–5)
CO2 SERPL-SCNC: 25.8 MMOL/L (ref 20–32)
CREAT SERPL-MCNC: 0.91 MG/DL (ref 0.6–1.3)
CREATININE URINE MG/DL: 200 MG/DL
GLUCOSE SERPL-MCNC: 137 MG/DL (ref 60–99)
HDLC SERPL-MCNC: 32 MG/DL (ref 40–150)
HEMOGLOBIN A1C: 9 % (ref 4–5.6)
LDLC SERPL CALC-MCNC: 33 MG/DL (ref 0–129)
POTASSIUM SERPL-SCNC: 3.9 MMOL/L (ref 3.5–5.3)
PROT SERPL-MCNC: 7.2 G/DL (ref 6.1–8.1)
SODIUM SERPL-SCNC: 136.9 MMOL/L (ref 135–146)
TRIGL SERPL-MCNC: 148 MG/DL (ref 0–149)

## 2025-03-13 PROCEDURE — 82570 ASSAY OF URINE CREATININE: CPT | Performed by: FAMILY MEDICINE

## 2025-03-13 PROCEDURE — 82043 UR ALBUMIN QUANTITATIVE: CPT | Performed by: FAMILY MEDICINE

## 2025-03-13 PROCEDURE — 80061 LIPID PANEL: CPT | Performed by: FAMILY MEDICINE

## 2025-03-13 PROCEDURE — 3078F DIAST BP <80 MM HG: CPT | Performed by: FAMILY MEDICINE

## 2025-03-13 PROCEDURE — 3075F SYST BP GE 130 - 139MM HG: CPT | Performed by: FAMILY MEDICINE

## 2025-03-13 PROCEDURE — G2211 COMPLEX E/M VISIT ADD ON: HCPCS | Performed by: FAMILY MEDICINE

## 2025-03-13 PROCEDURE — 99214 OFFICE O/P EST MOD 30 MIN: CPT | Performed by: FAMILY MEDICINE

## 2025-03-13 PROCEDURE — 83036 HEMOGLOBIN GLYCOSYLATED A1C: CPT | Performed by: FAMILY MEDICINE

## 2025-03-13 PROCEDURE — 84153 ASSAY OF PSA TOTAL: CPT | Mod: 90 | Performed by: FAMILY MEDICINE

## 2025-03-13 PROCEDURE — 80053 COMPREHEN METABOLIC PANEL: CPT | Performed by: FAMILY MEDICINE

## 2025-03-13 PROCEDURE — 36415 COLL VENOUS BLD VENIPUNCTURE: CPT | Performed by: FAMILY MEDICINE

## 2025-03-13 RX ORDER — ATORVASTATIN CALCIUM 20 MG/1
20 TABLET, FILM COATED ORAL DAILY
Qty: 90 TABLET | Refills: 1 | Status: SHIPPED | OUTPATIENT
Start: 2025-03-13

## 2025-03-13 RX ORDER — INSULIN GLARGINE 100 [IU]/ML
45 INJECTION, SOLUTION SUBCUTANEOUS AT BEDTIME
Qty: 45 ML | Refills: 0 | Status: SHIPPED | OUTPATIENT
Start: 2025-03-13

## 2025-03-13 RX ORDER — ATORVASTATIN CALCIUM 20 MG/1
10 TABLET, FILM COATED ORAL DAILY
Qty: 10 TABLET | Refills: 0 | Status: CANCELLED | OUTPATIENT
Start: 2025-03-13

## 2025-03-13 NOTE — NURSING NOTE
Lars Coleman is here for a diabetic check and medication refill.    Questioned patient about current smoking habits.  Pt. has never smoked.  Body mass index is 28.03 kg/m .  PULSE regular  My Chart: active  CLASSIFICATION OF OVERWEIGHT AND OBESITY BY BMI                        Obesity Class           BMI(kg/m2)  Underweight                                    < 18.5  Normal                                         18.5-24.9  Overweight                                     25.0-29.9  OBESITY                     I                  30.0-34.9                             II                 35.0-39.9  EXTREME OBESITY             III                >40                            Patient's  BMI Body mass index is 28.03 kg/m .  http://hin.nhlbi.nih.gov/menuplanner/menu.cgi    Pre-visit planning  Immunizations - up to date  Colonoscopy - is up to date  Mammogram -   Asthma -   PHQ9 -    ANYA-7 -      The patient has verbalized that it is ok to leave a detailed voice message on the patient's cell phone with results/recommendations from this visit.

## 2025-03-13 NOTE — PROGRESS NOTES
"  Assessment & Plan     Type 2 diabetes mellitus without complication, with long-term current use of insulin (H)  Suboptimal control but would expect improvements with recent start Mounjaro-for now will bump insulin to 45 units, watch morning sugars    Continue to work on healthy diet and exercise, discussed healthy habits     continue current medications at current doses   - HEMOGLOBIN A1C (BFP)  - ALBUMIN RANDOM URINE QUANTITATIVE (BFP)  - VENOUS COLLECTION    Mixed hyperlipidemia  Control uncertain, has self increased to 20 mg atorvastatin, continue current medications at current doses pending labs    Special screening for malignant neoplasm of prostate              BMI  Estimated body mass index is 28.03 kg/m  as calculated from the following:    Height as of this encounter: 1.803 m (5' 11\").    Weight as of this encounter: 91.2 kg (201 lb).         FUTURE APPOINTMENTS:       - Follow-up visit in 3-6 mo  Regular exercise    No follow-ups on file.    Sean Sousa is a 74 year old, presenting for the following health issues:  Recheck Medication    HPI        Diabetes Rpakno-dt-qd mounjaro for 2 weeks now, metformin, insulin 43 unit(s)      How often are you checking your blood sugar? Two times daily, morning sugars 150 recently  Blood sugar testing frequency justification:  Uncontrolled diabetes  What time of day are you checking your blood sugars (select all that apply)?  Before and after meals  Have you had any blood sugars above 200?  Yes   Have you had any blood sugars below 70?  No  What symptoms do you notice when your blood sugar is low?  Shaky  What concerns do you have today about your diabetes? None   Do you have any of these symptoms? (Select all that apply)  No numbness or tingling in feet.  No redness, sores or blisters on feet.  No complaints of excessive thirst.  No reports of blurry vision.  No significant changes to weight.      BP Readings from Last 2 Encounters:   03/13/25 138/62 " "  08/20/24 (!) 142/60     Hemoglobin A1C (%)   Date Value   11/14/2024 9.0 (A)   08/20/2024 8.2 (A)   01/25/2024 9.2 (A)   04/19/2023 8.1 (A)     LDL Cholesterol Calculated (mg/dL (calc))   Date Value   02/07/2019 99   02/27/2018 76     LDL Cholesterol Direct (mg/dL)   Date Value   01/25/2024 103   04/19/2023 70             Hyperlipidemia Chrjeq-By-pvp been taking 2 pills (20 mg)    Are you regularly taking any medication or supplement to lower your cholesterol?   Yes- atorvastatin  Are you having muscle aches or other side effects that you think could be caused by your cholesterol lowering medication?  No  How many servings of fruits and vegetables do you eat daily?  2-3  On average, how many sweetened beverages do you drink each day (Examples: soda, juice, sweet tea, etc.  Do NOT count diet or artificially sweetened beverages)?   1  How many days per week do you exercise enough to make your heart beat faster? 4  How many minutes a day do you exercise enough to make your heart beat faster? 20 - 29  How many days per week do you miss taking your medication? 0        Review of Systems  Constitutional, HEENT, cardiovascular, pulmonary, gi and gu systems are negative, except as otherwise noted.      Objective    /62 (BP Location: Right arm, Patient Position: Chair, Cuff Size: Adult Large)   Pulse 80   Temp 97.9  F (36.6  C) (Temporal)   Resp 20   Ht 1.803 m (5' 11\")   Wt 91.2 kg (201 lb)   BMI 28.03 kg/m    Body mass index is 28.03 kg/m .  Physical Exam   GENERAL: alert and no distress  EYES: Eyes grossly normal to inspection, PERRL and conjunctivae and sclerae normal  HENT: ear canals and TM's normal, nose and mouth without ulcers or lesions  NECK: no adenopathy, no asymmetry, masses, or scars  RESP: lungs clear to auscultation - no rales, rhonchi or wheezes  CV: regular rate and rhythm, normal S1 S2, no S3 or S4, no murmur, click or rub, no peripheral edema  ABDOMEN: soft, nontender, no " hepatosplenomegaly, no masses and bowel sounds normal  MS: no gross musculoskeletal defects noted, no edema  PSYCH: mentation appears normal, affect normal/bright    Results for orders placed or performed in visit on 03/13/25 (from the past 24 hours)   HEMOGLOBIN A1C (BFP)   Result Value Ref Range    Hemoglobin A1C 9.0 (A) 4 - 5.6 %   ALBUMIN RANDOM URINE QUANTITATIVE (BFP)   Result Value Ref Range    Albumin mg/L 30 30    Creatinine Urine mg/dL 200 300 mg/dL    Albumin Urine mg/g Cr <30 30 MG/G Creatinine           Signed Electronically by: Pop Pena MD

## 2025-05-28 DIAGNOSIS — Z79.4 TYPE 2 DIABETES MELLITUS WITHOUT COMPLICATION, WITH LONG-TERM CURRENT USE OF INSULIN (H): ICD-10-CM

## 2025-05-28 DIAGNOSIS — E11.9 TYPE 2 DIABETES MELLITUS WITHOUT COMPLICATION, WITH LONG-TERM CURRENT USE OF INSULIN (H): ICD-10-CM

## 2025-05-28 RX ORDER — METFORMIN HYDROCHLORIDE 500 MG/1
2000 TABLET, EXTENDED RELEASE ORAL
Qty: 360 TABLET | Refills: 0 | Status: SHIPPED | OUTPATIENT
Start: 2025-05-28

## 2025-05-28 NOTE — TELEPHONE ENCOUNTER
Lars Coleman is requesting a refill of:    Pending Prescriptions:                       Disp   Refills    metFORMIN (GLUCOPHAGE XR) 500 MG 24 hr ta*360 ta*0            Sig: TAKE 4 TABLETS BY MOUTH DAILY WITH DINNER.    Lab Results   Component Value Date    A1C 9.0 03/13/2025    A1C 9.0 11/14/2024    A1C 8.2 08/20/2024    A1C 9.2 01/25/2024    A1C 8.1 04/19/2023    A1C 9.8 12/07/2022    A1C 10.1 07/06/2022    A1C 8.9 05/03/2022     FUTURE APPOINTMENTS:       - Follow-up visit in 3-6 mo  Regular exercise

## 2025-06-09 ENCOUNTER — TELEPHONE (OUTPATIENT)
Dept: FAMILY MEDICINE | Facility: CLINIC | Age: 75
End: 2025-06-09

## 2025-06-09 DIAGNOSIS — Z79.4 TYPE 2 DIABETES MELLITUS WITHOUT COMPLICATION, WITH LONG-TERM CURRENT USE OF INSULIN (H): Primary | ICD-10-CM

## 2025-06-09 DIAGNOSIS — E11.9 TYPE 2 DIABETES MELLITUS WITHOUT COMPLICATION, WITH LONG-TERM CURRENT USE OF INSULIN (H): Primary | ICD-10-CM

## 2025-06-09 NOTE — TELEPHONE ENCOUNTER
Lars Coleman is requesting a refill of:    Pending Prescriptions:                       Disp   Refills    blood glucose (NO BRAND SPECIFIED) test s*100 st*3            Sig: Use to test blood sugar one time daily or as           directed.    Please close encounter if RX was sent. Thanks, Clara

## 2025-06-10 DIAGNOSIS — Z79.4 TYPE 2 DIABETES MELLITUS WITHOUT COMPLICATION, WITH LONG-TERM CURRENT USE OF INSULIN (H): Primary | ICD-10-CM

## 2025-06-10 DIAGNOSIS — E11.9 TYPE 2 DIABETES MELLITUS WITHOUT COMPLICATION, WITH LONG-TERM CURRENT USE OF INSULIN (H): Primary | ICD-10-CM

## 2025-06-10 NOTE — TELEPHONE ENCOUNTER
Lars Coleman is requesting a refill of:    Pending Prescriptions:                       Disp   Refills    blood glucose monitoring (NO BRAND SPECIF*1 kit  0            Sig: Use to test blood sugar one time daily or as           directed.    blood glucose (NO BRAND SPECIFIED) lancet*100 ea*3            Sig: Use to test blood sugar one time daily or as           directed.    Please close encounter if RX was sent. Thanks, Clara

## 2025-06-17 DIAGNOSIS — Z79.4 TYPE 2 DIABETES MELLITUS WITHOUT COMPLICATION, WITH LONG-TERM CURRENT USE OF INSULIN (H): ICD-10-CM

## 2025-06-17 DIAGNOSIS — E11.9 TYPE 2 DIABETES MELLITUS WITHOUT COMPLICATION, WITH LONG-TERM CURRENT USE OF INSULIN (H): ICD-10-CM

## 2025-06-17 RX ORDER — TIRZEPATIDE 5 MG/.5ML
INJECTION, SOLUTION SUBCUTANEOUS
Refills: 1 | OUTPATIENT
Start: 2025-06-17

## 2025-06-17 NOTE — TELEPHONE ENCOUNTER
Lars Coleman is requesting a refill of:    Refused Prescriptions:                       Disp   Refills    MOUNJARO 5 MG/0.5ML SOAJ auto-injector pen*       1        Sig: INJECT 0.5 ML (5 MG) SUBCUTANEOUSLY EVERY 7 DAYS.  Refused By: SIVAN BARFIELD  Reason for Refusal: Patient needs appointment    Needs OV for refills

## 2025-06-30 ENCOUNTER — RESULTS FOLLOW-UP (OUTPATIENT)
Dept: FAMILY MEDICINE | Facility: CLINIC | Age: 75
End: 2025-06-30

## 2025-06-30 ENCOUNTER — OFFICE VISIT (OUTPATIENT)
Dept: FAMILY MEDICINE | Facility: CLINIC | Age: 75
End: 2025-06-30

## 2025-06-30 VITALS
HEART RATE: 87 BPM | SYSTOLIC BLOOD PRESSURE: 130 MMHG | TEMPERATURE: 97.3 F | WEIGHT: 200.8 LBS | OXYGEN SATURATION: 99 % | DIASTOLIC BLOOD PRESSURE: 68 MMHG | BODY MASS INDEX: 28.01 KG/M2

## 2025-06-30 DIAGNOSIS — Z79.4 TYPE 2 DIABETES MELLITUS WITHOUT COMPLICATION, WITH LONG-TERM CURRENT USE OF INSULIN (H): Primary | ICD-10-CM

## 2025-06-30 DIAGNOSIS — E78.2 MIXED HYPERLIPIDEMIA: ICD-10-CM

## 2025-06-30 DIAGNOSIS — E11.9 TYPE 2 DIABETES MELLITUS WITHOUT COMPLICATION, WITH LONG-TERM CURRENT USE OF INSULIN (H): Primary | ICD-10-CM

## 2025-06-30 LAB
ALBUMIN SERPL-MCNC: 4.5 G/DL (ref 3.6–5.1)
ALP SERPL-CCNC: 67 U/L (ref 33–130)
ALT 1742-6: 21 U/L (ref 0–32)
AST 1920-8: 18 U/L (ref 0–35)
BILIRUB SERPL-MCNC: 0.7 MG/DL (ref 0.2–1.2)
BUN SERPL-MCNC: 13 MG/DL (ref 7–25)
BUN/CREATININE RATIO: 14 (ref 6–32)
CALCIUM SERPL-MCNC: 9.2 MG/DL (ref 8.6–10.3)
CHLORIDE SERPLBLD-SCNC: 103.6 MMOL/L (ref 98–110)
CHOLEST SERPL-MCNC: 181 MG/DL (ref 0–199)
CHOLEST/HDLC SERPL: 5 {RATIO} (ref 0–5)
CO2 SERPL-SCNC: 26.5 MMOL/L (ref 20–32)
CREAT SERPL-MCNC: 0.92 MG/DL (ref 0.6–1.3)
GLUCOSE SERPL-MCNC: 206 MG/DL (ref 60–99)
HDLC SERPL-MCNC: 36 MG/DL (ref 40–150)
HEMOGLOBIN A1C: 8.1 % (ref 4–5.6)
LDLC SERPL CALC-MCNC: 50 MG/DL (ref 0–129)
POTASSIUM SERPL-SCNC: 4.44 MMOL/L (ref 3.5–5.3)
PROT SERPL-MCNC: 7.4 G/DL (ref 6.1–8.1)
SODIUM SERPL-SCNC: 140 MMOL/L (ref 135–146)
TRIGL SERPL-MCNC: 477 MG/DL (ref 0–149)

## 2025-06-30 PROCEDURE — 80053 COMPREHEN METABOLIC PANEL: CPT | Performed by: FAMILY MEDICINE

## 2025-06-30 PROCEDURE — G2211 COMPLEX E/M VISIT ADD ON: HCPCS | Performed by: FAMILY MEDICINE

## 2025-06-30 PROCEDURE — 80061 LIPID PANEL: CPT | Performed by: FAMILY MEDICINE

## 2025-06-30 PROCEDURE — 83036 HEMOGLOBIN GLYCOSYLATED A1C: CPT | Performed by: FAMILY MEDICINE

## 2025-06-30 PROCEDURE — 36415 COLL VENOUS BLD VENIPUNCTURE: CPT | Performed by: FAMILY MEDICINE

## 2025-06-30 PROCEDURE — 99214 OFFICE O/P EST MOD 30 MIN: CPT | Performed by: FAMILY MEDICINE

## 2025-06-30 NOTE — PROGRESS NOTES
Assessment & Plan     Type 2 diabetes mellitus without complication, with long-term current use of insulin (H)  Improved control, no lows, we agree to increase mounjaro, continue insulin-try not to decrease unless true lows-recheck 6 mo  - HEMOGLOBIN A1C (BFP)  - VENOUS COLLECTION    Mixed hyperlipidemia  Control uncertain, continue current medications at current doses pending labs after increase              Follow-up 6 mo      Sean Sousa is a 74 year old, presenting for the following health issues:  Diabetes (Diabetic check. Is fasting.)    HPI        Diabetes Eaymbl-cs-amj tolerated mounjaro 5 mg for 3 mo, Lantus 43 units-occasionally takes less insulin if he sees sugars in 160's at night    How often are you checking your blood sugar? One time daily  What time of day are you checking your blood sugars (select all that apply)?  Before and after meals  Have you had any blood sugars above 200?  Yes   Have you had any blood sugars below 70?  No  What symptoms do you notice when your blood sugar is low?  Shaky  What concerns do you have today about your diabetes? None   Do you have any of these symptoms? (Select all that apply)  No numbness or tingling in feet.  No redness, sores or blisters on feet.  No complaints of excessive thirst.  No reports of blurry vision.  No significant changes to weight.  Have you had a diabetic eye exam in the last 12 months? no        BP Readings from Last 2 Encounters:   06/30/25 130/68   03/13/25 138/62     Hemoglobin A1C (%)   Date Value   03/13/2025 9.0 (A)   11/14/2024 9.0 (A)   01/25/2024 9.2 (A)   04/19/2023 8.1 (A)     LDL Cholesterol Calculated (mg/dL (calc))   Date Value   02/07/2019 99   02/27/2018 76     LDL Cholesterol Direct (mg/dL)   Date Value   01/25/2024 103   04/19/2023 70             Hyperlipidemia Follow-Up- now at 20 mg statin    Are you regularly taking any medication or supplement to lower your cholesterol?   Yes- atorvastatin  Are you having muscle  aches or other side effects that you think could be caused by your cholesterol lowering medication?  No  How many servings of fruits and vegetables do you eat daily?  2-3  On average, how many sweetened beverages do you drink each day (Examples: soda, juice, sweet tea, etc.  Do NOT count diet or artificially sweetened beverages)?   1  How many days per week do you exercise enough to make your heart beat faster? 3 or less  How many minutes a day do you exercise enough to make your heart beat faster? 60 min  How many days per week do you miss taking your medication? 0        Review of Systems  Constitutional, HEENT, cardiovascular, pulmonary, gi and gu systems are negative, except as otherwise noted.      Objective    /68 (BP Location: Right arm, Patient Position: Sitting, Cuff Size: Adult Regular)   Pulse 87   Temp 97.3  F (36.3  C) (Temporal)   Wt 91.1 kg (200 lb 12.8 oz)   SpO2 99%   BMI 28.01 kg/m    Body mass index is 28.01 kg/m .  Physical Exam   GENERAL: alert and no distress  NECK: no adenopathy, no asymmetry, masses, or scars  RESP: lungs clear to auscultation - no rales, rhonchi or wheezes  CV: regular rate and rhythm, normal S1 S2, no S3 or S4, no murmur, click or rub, no peripheral edema  ABDOMEN: soft, nontender, no hepatosplenomegaly, no masses and bowel sounds normal  MS: no gross musculoskeletal defects noted, no edema    Recent Results (from the past 24 hours)   HEMOGLOBIN A1C (BFP)   Result Value Ref Range    Hemoglobin A1C 8.1 (A) 4 - 5.6 %           Signed Electronically by: Pop Pena MD

## 2025-06-30 NOTE — NURSING NOTE
Chief Complaint   Patient presents with    Diabetes     Diabetic check. Is fasting.     Pre-visit Screening:  Immunizations:  not up to date - Covid  Colonoscopy:  is up to date  Mammogram: NA  Asthma Action Test/Plan:  Na  PHQ9:  NA  GAD7:  NA  Questioned patient about current smoking habits Pt. has never smoked.  Ok to leave detailed message on voice mail for today's visit only Yes, phone # [phone] (home)

## 2025-07-10 DIAGNOSIS — Z79.4 TYPE 2 DIABETES MELLITUS WITHOUT COMPLICATION, WITH LONG-TERM CURRENT USE OF INSULIN (H): ICD-10-CM

## 2025-07-10 DIAGNOSIS — E11.9 TYPE 2 DIABETES MELLITUS WITHOUT COMPLICATION, WITH LONG-TERM CURRENT USE OF INSULIN (H): ICD-10-CM

## 2025-07-10 NOTE — TELEPHONE ENCOUNTER
Refilling to get patient through vacation.    Moon Toney, PharmD  Clinical Pharmacist  [facility] Phone: [phone]  Direct Office Phone: [phone]

## 2025-08-12 DIAGNOSIS — Z79.4 TYPE 2 DIABETES MELLITUS WITHOUT COMPLICATION, WITH LONG-TERM CURRENT USE OF INSULIN (H): ICD-10-CM

## 2025-08-12 DIAGNOSIS — E11.9 TYPE 2 DIABETES MELLITUS WITHOUT COMPLICATION, WITH LONG-TERM CURRENT USE OF INSULIN (H): ICD-10-CM

## 2025-08-18 RX ORDER — TIRZEPATIDE 7.5 MG/.5ML
INJECTION, SOLUTION SUBCUTANEOUS
Qty: 6 ML | Refills: 0 | Status: SHIPPED | OUTPATIENT
Start: 2025-08-18

## 2025-08-30 DIAGNOSIS — E11.9 TYPE 2 DIABETES MELLITUS WITHOUT COMPLICATION, WITH LONG-TERM CURRENT USE OF INSULIN (H): ICD-10-CM

## 2025-08-30 DIAGNOSIS — Z79.4 TYPE 2 DIABETES MELLITUS WITHOUT COMPLICATION, WITH LONG-TERM CURRENT USE OF INSULIN (H): ICD-10-CM

## 2025-09-02 RX ORDER — INSULIN GLARGINE 100 [IU]/ML
45 INJECTION, SOLUTION SUBCUTANEOUS AT BEDTIME
Qty: 45 ML | Refills: 0 | Status: SHIPPED | OUTPATIENT
Start: 2025-09-02

## (undated) DEVICE — ESU GROUND PAD ADULT W/CORD E7507

## (undated) DEVICE — PACK MINOR CUSTOM RIDGES SBA32RMRMA

## (undated) DEVICE — PREP POVIDONE IODINE SOLUTION 10% 4OZ BOTTLE 29906-004

## (undated) DEVICE — TUBING SUCTION MEDI-VAC SOFT 3/16"X20' N520A

## (undated) DEVICE — SU VICRYL 3-0 SH-1 27" J311H

## (undated) DEVICE — SU MONOCRYL 4-0 PS-2 27" UND Y426H

## (undated) DEVICE — SU MONOCRYL 4-0 P-3 18" UND Y494G

## (undated) DEVICE — GOWN LG DISP 9515

## (undated) DEVICE — LINEN HALF SHEET 5512

## (undated) DEVICE — PREP SKIN SCRUB TRAY 4461A

## (undated) DEVICE — GOWN XLG DISP 9545

## (undated) DEVICE — BAG CLEAR TRASH 1.3M 39X33" P4040C

## (undated) DEVICE — MANIFOLD NEPTUNE 4 PORT 700-20

## (undated) DEVICE — PREP POVIDONE-IODINE 7.5% SCRUB 4OZ BOTTLE MDS093945

## (undated) DEVICE — LINEN TOWEL PACK X5 5464

## (undated) DEVICE — SOL WATER IRRIG 1000ML BOTTLE 07139-09

## (undated) DEVICE — SYR 10ML LL W/O NDL 302995

## (undated) DEVICE — PREP PAD ALCOHOL 6818

## (undated) DEVICE — GLOVE PROTEXIS POWDER FREE 6.5 ORTHOPEDIC 2D73ET65

## (undated) DEVICE — LINEN TOWEL PACK X10 5473

## (undated) DEVICE — ENDO NDL BALL TIP ULTRASOUND 22GA 5.2FR ECHO-3-22

## (undated) DEVICE — COVER FOOTSWITCH URO

## (undated) DEVICE — ENDO PROBE COVER ULTRASOUND BALLOON LATEX  MAJ-249

## (undated) DEVICE — GLOVE PROTEXIS BLUE W/NEU-THERA 6.5  2D73EB65

## (undated) DEVICE — DRSG STERI STRIP 1/2X4" R1547

## (undated) DEVICE — APPLICATORS COTTON TIP 6"X2 STERILE LF C15053-006

## (undated) DEVICE — SU VICRYL 3-0 SH 27" UND J416H

## (undated) DEVICE — ESU PENCIL W/HOLSTER E2350H

## (undated) DEVICE — TUBING SMOKE EVAC ATTACHMENT E3590

## (undated) DEVICE — LINEN FULL SHEET 5511

## (undated) DEVICE — SUCTION CANISTER MEDIVAC LINER 3000ML W/LID 65651-530

## (undated) RX ORDER — CIPROFLOXACIN 2 MG/ML
INJECTION, SOLUTION INTRAVENOUS
Status: DISPENSED
Start: 2021-01-11

## (undated) RX ORDER — LIDOCAINE HYDROCHLORIDE 10 MG/ML
INJECTION, SOLUTION EPIDURAL; INFILTRATION; INTRACAUDAL; PERINEURAL
Status: DISPENSED
Start: 2021-01-11

## (undated) RX ORDER — PROPOFOL 10 MG/ML
INJECTION, EMULSION INTRAVENOUS
Status: DISPENSED
Start: 2021-01-11

## (undated) RX ORDER — FENTANYL CITRATE 50 UG/ML
INJECTION, SOLUTION INTRAMUSCULAR; INTRAVENOUS
Status: DISPENSED
Start: 2022-07-19

## (undated) RX ORDER — BUPIVACAINE HYDROCHLORIDE 2.5 MG/ML
INJECTION, SOLUTION EPIDURAL; INFILTRATION; INTRACAUDAL
Status: DISPENSED
Start: 2022-07-19

## (undated) RX ORDER — ONDANSETRON 2 MG/ML
INJECTION INTRAMUSCULAR; INTRAVENOUS
Status: DISPENSED
Start: 2021-01-11

## (undated) RX ORDER — FENTANYL CITRATE 50 UG/ML
INJECTION, SOLUTION INTRAMUSCULAR; INTRAVENOUS
Status: DISPENSED
Start: 2021-01-11

## (undated) RX ORDER — CEFAZOLIN SODIUM/WATER 2 G/20 ML
SYRINGE (ML) INTRAVENOUS
Status: DISPENSED
Start: 2022-07-19

## (undated) RX ORDER — LIDOCAINE HYDROCHLORIDE 10 MG/ML
INJECTION, SOLUTION EPIDURAL; INFILTRATION; INTRACAUDAL; PERINEURAL
Status: DISPENSED
Start: 2022-07-19